# Patient Record
Sex: FEMALE | Race: WHITE | NOT HISPANIC OR LATINO | Employment: UNEMPLOYED | URBAN - METROPOLITAN AREA
[De-identification: names, ages, dates, MRNs, and addresses within clinical notes are randomized per-mention and may not be internally consistent; named-entity substitution may affect disease eponyms.]

---

## 2017-04-20 ENCOUNTER — ALLSCRIPTS OFFICE VISIT (OUTPATIENT)
Dept: OTHER | Facility: OTHER | Age: 32
End: 2017-04-20

## 2017-04-20 DIAGNOSIS — F32.81 PREMENSTRUAL DYSPHORIC DISORDER: ICD-10-CM

## 2017-04-26 LAB
ADDITIONAL INFORMATION (HISTORICAL): NORMAL
ADEQUACY: (HISTORICAL): NORMAL
CYTOTECHNOLOGIST: (HISTORICAL): NORMAL
INTERPRETATION (HISTORICAL): NORMAL
LMP (HISTORICAL): NORMAL
PREV. BX: (HISTORICAL): NORMAL
PREV. PAP (HISTORICAL): NORMAL
SOURCE (HISTORICAL): NORMAL

## 2018-01-13 NOTE — PROCEDURES
Plan  Currently pregnant    · (Q) HIV-1/2 ANTIGEN AND ANTIBODIES, FOURTH GENERATION, with REFLEXES; [Do  Not Release]; Status:Active - Retrospective Authorization; Requested PPI:[id number];    Perform:[facility]; XIS:[id number]; Last Updated By:Maggy Zelaya; 1/20/2016 7:20:54 AM;Ordered; For:Currently pregnant; Ordered By:Melody Garcia;   · (Q) OBSTETRIC PANEL; Status:Active - Retrospective Authorization; Requested  XMJ:[id number];    Perform:[facility]; Order Comments:Urine to be run for group b strep test code 1850; EXV:[id number]; Last Updated By:Maggy Zelaya; 1/20/2016 7:20:54 AM;Ordered; For:Currently pregnant; Ordered By:Melody Garcia;   · *[phone] Physician Referral  Consult  Status: Hold For -  Scheduling,Retrospective Authorization  Requested for: [id number]   Ordered; For: Currently pregnant; Ordered By: Ping Huerta Performed:  Due: 84FHS2820; Last Updated By: [facility] [place]; 1/20/2016 7:20:55 AM  Care Summary provided  : Yes    Active Problems    1  Currently pregnant (V22 2) (Z33 1)   2  Fatigue (780 79) (R53 83)   3  Palpitations (785 1) (R00 2)    Allergies    1  No Known Drug Allergies    2  Seasonal    Results/Data  [id number] Transvaginal Ultrasound OB [facility]:   Procedure: The study was done today in the office  Indication: EDC gestational age 8w7d weeks  Exam indication: Dating/viability  Findings:   Number of gestational sacs and fetuses: 1/1  Gestational sac/fetal measurements appropriate for gestation: Sac=9+ wks  CRL=24mm=9w1d  Survey of visible fetal and placental anatomic structure WNL  Qualitative assessment of amniotic fluid volume/gestational sac shape: WNL  Exam of maternal uterus and adnexa: UT=109 x 80 x 90 mm  RT OV=29 x 21 x 27 mm,LT OV=32 x 30 x 34 mm  Impression: SV DYZ=770 BPM  EDC=8/23/2016        Signatures   Electronically signed by : Philip Boone, ; Jan 20 2016  9:29AM EST                       (Author)    Electronically signed by : ALEM White ; Jan 20 2016  9:37AM EST                       (Author)

## 2018-01-14 VITALS
BODY MASS INDEX: 22.7 KG/M2 | HEIGHT: 61 IN | WEIGHT: 120.25 LBS | DIASTOLIC BLOOD PRESSURE: 70 MMHG | SYSTOLIC BLOOD PRESSURE: 104 MMHG

## 2018-01-14 NOTE — PROGRESS NOTES
2016         RE: Teri Lynn                                   [facility]   MR#: [id number]                                   [address]   : [address] [address]   ENC: [id number]                             [place], [address]   (Exam #: [id number])                           Fax: [phone]      The LMP of this 27year old,  5, para 2 patient was 2015,   giving her an SHUBHAM of AUG 26 2016 and a current gestational age of 25 weeks   5 days by dates  The patient has a history of one ectopic pregnancy  A   sonographic examination was performed on 2016 using real time   equipment  The ultrasound examination was performed using abdominal &   vaginal techniques  The patient has a BMI of 24 4  Her blood pressure   today was 108/65  Earliest ultrasound found in her record:  2016   9w 1d    SHUBHAM      Thank you very much for your kind referral of Teri Lynn to the [facility] in [place] on 2016 for level II ultrasound evaluation   and  assessment  Sean Yarbrough is a 72-year-old  5 para  white   female who is currently at 20-5/7 weeks gestation by an estimated due date   of 2016  With the exception of occasional migraine headaches,   her prenatal course has been unremarkable so far  Sean Yarbrough has no complaints  She reports fetal movement and denies vaginal bleeding  She declined   genetic screening earlier in the pregnancy  Sean Yarbrough has a history of 2 prior vaginal deliveries at term in 2004 and 2014   following uncomplicated prenatal courses  She delivered appropriately   grown babies, each currently healthy  She also has a history of one tubal   ectopic pregnancy and one first trimester spontaneous pregnancy losses     Sean Yarbrough has a history of an apparent tachyarrhythmia, for which she underwent   a cardiac ablative procedure at the [address] [place] [address] [place] in 2014  She is currently not treated medically for the   indication of an arrhythmia  Her past medical history is otherwise   significant for mitral valve prolapse and regurgitation  Her past surgical   history is significant for wisdom teeth removal   Ching Dawson has an apparent   history of endometriosis  She takes no medication with the exception of a   prenatal vitamin on a daily basis and has no known drug allergy  She   denies tobacco, alcohol, or illicit drug use during the pregnancy  The   family medical history is negative with respect to first degree relatives   with diabetes, hypertension, or venous thromboembolism  The family genetic   history is negative with respect to genetic abnormalities, birth defects,   or mental retardation        Cardiac motion was observed at 154 bpm       INDICATIONS      fetal anatomical survey      Exam Types      LEVEL II   Transvaginal      RESULTS      Fetus # 1 of 1   Breech presentation   Fetal growth appeared normal   Placenta Location = Anterior   No placenta previa   Placenta Grade = I      MEASUREMENTS (* Included In Average GA)      AC              15 8 cm        20 weeks 4 days* (49%)   BPD              4 9 cm        20 weeks 6 days* (53%)   HC              18 3 cm        20 weeks 4 days* (45%)   Femur            3 3 cm        20 weeks 4 days* (39%)      Nuchal Fold      3 6 mm      Humerus          3 4 cm        21 weeks 5 days  (69%)   Radius           2 7 cm        21 weeks 3 days   Ulna             3 2 cm        22 weeks 0 days   Tibia            3 1 cm        21 weeks 1 day   (56%)   Fibula           2 9 cm        19 weeks 5 days   Foot             3 4 cm        20 weeks 3 days      Cerebellum       2 2 cm        21 weeks 5 days   Biorbit          3 1 cm        19 weeks 6 days   CisternaMagna    3 9 mm      HC/AC           1 16   FL/AC           0 21   FL/BPD          0 67   EFW (Ac/Fl/Hc)   366 grams - 0 lbs 13 oz      THE AVERAGE GESTATIONAL AGE is 20 weeks 5 days +/- 10 days  AMNIOTIC FLUID      Largest Vertical Pocket = 5 3 cm   Amniotic Fluid: Normal      ANATOMY SUMMARY      The fetal cranium appeared normal in shape  Choroid plexus cysts are not   present  The lateral ventricles were not dilated and the midline   structures were not deviated  The cerebellum and cisterna magna were   visualized and appeared normal  The nuchal fold is not abnormally   thickened, measured at 3 6 mm  The calvarium showed no evidence of defect,   scalloping of the parietal bones or abnormal shape  The cavum septum   pellucidum appears normal  The fetal face appears normal  There is no   evidence of facial clefting, hypotelorism, hypertelorism or micrognathia  A normal appearing nasal bone measuring 6 0 mm was identified in the   sagittal profile view  3-D views of the face appeared normal  Anatomy of   the fetal thorax appeared within normal limits  The fetal diaphragm   appears intact  There were no intrathoracic masses noted or evidence of   pleural/pericardial effusion  The cardiac arch views appeared normal     There was no suspicion of tricuspid regurgitation  The cardiac size and   structures appeared sonographically normal at the four chamber view, and   cardiac rhythm was regular  There is no suspicion of fetal dysrhythmia  There was no evidence of cardiomegaly, pericardial effusion or   atrial/ventricular disproportion  Two atrioventricular valves were noted   with normal inflow, confirmed with color Doppler imaging  Ventriculoarterial connections are appropriate and normal short axis of   the great vessels is demonstrated  The interventricular septum appeared to   be intact  There is no suspicion of an echogenic intracardiac focus  The   three vessel  tracheal view appears normal   The outflow tract views are   normal  The abdominal cavity appears normal  There is no evidence of fetal   bowel obstruction or abnormally echogenic bowel   Ascites is not present  The fetal stomach appears normal in size and shape  The right kidney   appears normal  There is no suspicion of pyelectasis  Renal cysts are not   present  The echogenicity of the kidney is normal  The left kidney   appears normal   There is no suspicion of pyelectasis  The echogenicity   of the kidney is normal   renal cysts are not present  The fetal bladder   appears normal in size and shape  There is no suspicion of ureterocele  The abdominal wall appears intact  A normal abdominal cord insertion is   noted  The spine was visualized from cervical to sacral region, within the   resolution of the ultrasound equipment, without evidence of a neural tube   defect  or other malformation  Active movement of the extremities was seen   and fetal body motion was also observed during this examination  There was   no evidence of long bone abnormality and the extremities were followed to   their distal extent  There was no evidence of club foot or rocker bottom   deformity  There was no evidence of abnormal hand posturing noted  The   genitalia appeared normal  The placenta appears normal  There is no   evidence of advanced placental maturation, placental abruption,   intervillous thrombosis, placental infarction or multiple venous lakes  There is a 3 vessel cord with normal insertion site  A normal amount of   umbilical vascular coiling is noted  The uterine contour appears normal    There is no suspicion of a uterine myoma  ADNEXA      The left ovary appeared normal and measured 1 3 x 1 2 x 1 8 cm with a   volume of 1 5 cc  The right ovary appeared normal and measured 2 4 x 2 5 x   1 5 cm with a volume of 4 7 cc        UTERINE ARTERIES                                  S/D   PI    RI    NOTCH       Left Uterine Artery              1 08         No       Right Uterine Artery             0 90         No      CERVICAL EVALUATION      The cervix appeared normal            Supine               Cervical Length: 3 90 cm        Other Test Results         Resp To T F  Pressure: No                    Funneling?: No              Dynamic Changes?: No      IMPRESSION      [place] IUP   20 weeks and 5 days by this ultrasound  (SHUBHAM=AUG 26 2016)   Breech presentation   Fetal growth appeared normal   Normal anatomy survey   Regular fetal heart rate of 154 bpm   Anterior placenta   No placenta previa      GENERAL COMMENT      No fetal structural abnormality or ultrasound marker for aneuploidy is   identified on the Level II ultrasound study today  Fetal growth, amniotic   fluid volume, and maternal uterine artery Doppler study are normal   The   placenta is normal in appearance  The cervix is normal in appearance by transvaginal sonography  The   cervical length is normal   Cervical debris is not present  Cervical   funneling is not present  Neither provocative nor dynamic change is   appreciated  Today's ultrasound findings and suggested follow-up were discussed in   detail with Lisette  We discussed that prenatal ultrasound cannot rule out   all congenital abnormalities  No further appointment has been scheduled in   the [facility]  for Ambrose Ghosh at this time  Follow-up [facility] ultrasound   evaluation is recommended as clinically indicated  The face to face time, in addition to time spent discussing ultrasound   results, was 10 minutes, greater than 50% of which was spent during   counseling and coordination of care  Rutha Munster, R D M S Olean Kanner, M D     Maternal-Fetal Medicine   Electronically signed 04/13/16 13:56

## 2018-01-16 NOTE — PROGRESS NOTES
Active Problems    1  Currently pregnant (V22 2) (Z33 1)   2  Encounter for fetal anatomic survey (V28 81) (Z36)   3  Encounter for screening for risk of pre-term labor (V28 82) (Z36)   4  Episode of dizziness (780 4) (R42)   5  Fatigue (780 79) (R53 83)   6  Migraine headache (346 90) (G43 909)   7  Palpitations (785 1) (R00 2)    Current Meds    1  Prenatal TABS; Therapy: (Recorded:04Apr2016) to Recorded    Allergies    1  No Known Drug Allergies    2  Seasonal    Results/Data  [id number] Abdominal Ultrasound OB [facility]:   Procedure: 19347- Ultrasound pregnant uterus real time with image documentation, limited one or more fetuses  Indication: EDC gestational age 32 weeks  Exam indication: presentation  Findings:   Fetal position: breech  Impression: Breech on US        Signatures   Electronically signed by : ALEM Flores ; May 27 2016  9:29AM EST                       (Author)

## 2018-11-29 ENCOUNTER — APPOINTMENT (EMERGENCY)
Dept: RADIOLOGY | Facility: HOSPITAL | Age: 33
DRG: 313 | End: 2018-11-29
Payer: COMMERCIAL

## 2018-11-29 ENCOUNTER — HOSPITAL ENCOUNTER (INPATIENT)
Facility: HOSPITAL | Age: 33
LOS: 5 days | Discharge: HOME | DRG: 313 | End: 2018-12-07
Attending: EMERGENCY MEDICINE | Admitting: INTERNAL MEDICINE
Payer: COMMERCIAL

## 2018-11-29 DIAGNOSIS — R07.9 CHEST PAIN: ICD-10-CM

## 2018-11-29 DIAGNOSIS — G43.909 MIGRAINE WITHOUT STATUS MIGRAINOSUS, NOT INTRACTABLE, UNSPECIFIED MIGRAINE TYPE: ICD-10-CM

## 2018-11-29 DIAGNOSIS — I34.0 MITRAL REGURGITATION: ICD-10-CM

## 2018-11-29 DIAGNOSIS — R00.0 INAPPROPRIATE SINUS TACHYCARDIA: ICD-10-CM

## 2018-11-29 DIAGNOSIS — I51.89 IMPAIRED LEFT VENTRICULAR FUNCTION: ICD-10-CM

## 2018-11-29 DIAGNOSIS — R06.00 DYSPNEA ON EXERTION: Primary | ICD-10-CM

## 2018-11-29 DIAGNOSIS — I95.9 HYPOTENSION: ICD-10-CM

## 2018-11-29 DIAGNOSIS — I34.1 MITRAL VALVE PROLAPSE: ICD-10-CM

## 2018-11-29 PROBLEM — I47.11 INAPPROPRIATE SINUS TACHYCARDIA: Status: ACTIVE | Noted: 2018-11-29

## 2018-11-29 PROBLEM — E55.9 VITAMIN D DEFICIENCY: Status: ACTIVE | Noted: 2018-11-29

## 2018-11-29 LAB
ALBUMIN SERPL BCP-MCNC: 3.8 G/DL (ref 3.5–5)
ALP SERPL-CCNC: 55 U/L (ref 46–116)
ALT SERPL W P-5'-P-CCNC: 20 U/L (ref 12–78)
ANION GAP SERPL CALCULATED.3IONS-SCNC: 4 MMOL/L (ref 4–13)
AST SERPL W P-5'-P-CCNC: 11 U/L (ref 5–45)
ATRIAL RATE: 65 BPM
ATRIAL RATE: 71 BPM
BASOPHILS # BLD AUTO: 0.03 THOUSANDS/ΜL (ref 0–0.1)
BASOPHILS NFR BLD AUTO: 0 % (ref 0–1)
BILIRUB SERPL-MCNC: 0.77 MG/DL (ref 0.2–1)
BUN SERPL-MCNC: 7 MG/DL (ref 5–25)
CALCIUM SERPL-MCNC: 9.4 MG/DL (ref 8.3–10.1)
CHLORIDE SERPL-SCNC: 105 MMOL/L (ref 100–108)
CO2 SERPL-SCNC: 26 MMOL/L (ref 21–32)
CREAT SERPL-MCNC: 0.73 MG/DL (ref 0.6–1.3)
DEPRECATED D DIMER PPP: <220 NG/ML (FEU)
EOSINOPHIL # BLD AUTO: 0.09 THOUSAND/ΜL (ref 0–0.61)
EOSINOPHIL NFR BLD AUTO: 1 % (ref 0–6)
ERYTHROCYTE [DISTWIDTH] IN BLOOD BY AUTOMATED COUNT: 12.3 % (ref 11.6–15.1)
GFR SERPL CREATININE-BSD FRML MDRD: 109 ML/MIN/1.73SQ M
GLUCOSE SERPL-MCNC: 84 MG/DL (ref 65–140)
HCT VFR BLD AUTO: 39.6 % (ref 34.8–46.1)
HGB BLD-MCNC: 12.9 G/DL (ref 11.5–15.4)
IMM GRANULOCYTES # BLD AUTO: 0.03 THOUSAND/UL (ref 0–0.2)
IMM GRANULOCYTES NFR BLD AUTO: 0 % (ref 0–2)
LYMPHOCYTES # BLD AUTO: 2.64 THOUSANDS/ΜL (ref 0.6–4.47)
LYMPHOCYTES NFR BLD AUTO: 32 % (ref 14–44)
MCH RBC QN AUTO: 29.3 PG (ref 26.8–34.3)
MCHC RBC AUTO-ENTMCNC: 32.6 G/DL (ref 31.4–37.4)
MCV RBC AUTO: 90 FL (ref 82–98)
MONOCYTES # BLD AUTO: 0.51 THOUSAND/ΜL (ref 0.17–1.22)
MONOCYTES NFR BLD AUTO: 6 % (ref 4–12)
NEUTROPHILS # BLD AUTO: 4.86 THOUSANDS/ΜL (ref 1.85–7.62)
NEUTS SEG NFR BLD AUTO: 61 % (ref 43–75)
NRBC BLD AUTO-RTO: 0 /100 WBCS
NT-PROBNP SERPL-MCNC: 70 PG/ML
P AXIS: 27 DEGREES
P AXIS: 46 DEGREES
PLATELET # BLD AUTO: 206 THOUSANDS/UL (ref 149–390)
PMV BLD AUTO: 10.9 FL (ref 8.9–12.7)
POTASSIUM SERPL-SCNC: 4.1 MMOL/L (ref 3.5–5.3)
PR INTERVAL: 112 MS
PR INTERVAL: 128 MS
PROT SERPL-MCNC: 7.1 G/DL (ref 6.4–8.2)
QRS AXIS: 77 DEGREES
QRS AXIS: 83 DEGREES
QRSD INTERVAL: 84 MS
QRSD INTERVAL: 86 MS
QT INTERVAL: 382 MS
QT INTERVAL: 410 MS
QTC INTERVAL: 415 MS
QTC INTERVAL: 426 MS
RBC # BLD AUTO: 4.4 MILLION/UL (ref 3.81–5.12)
SODIUM SERPL-SCNC: 135 MMOL/L (ref 136–145)
T WAVE AXIS: -7 DEGREES
T WAVE AXIS: -9 DEGREES
TROPONIN I SERPL-MCNC: <0.02 NG/ML
VENTRICULAR RATE: 65 BPM
VENTRICULAR RATE: 71 BPM
WBC # BLD AUTO: 8.16 THOUSAND/UL (ref 4.31–10.16)

## 2018-11-29 PROCEDURE — 84484 ASSAY OF TROPONIN QUANT: CPT | Performed by: EMERGENCY MEDICINE

## 2018-11-29 PROCEDURE — 99220 PR INITIAL OBSERVATION CARE/DAY 70 MINUTES: CPT | Performed by: INTERNAL MEDICINE

## 2018-11-29 PROCEDURE — 99285 EMERGENCY DEPT VISIT HI MDM: CPT

## 2018-11-29 PROCEDURE — 93005 ELECTROCARDIOGRAM TRACING: CPT

## 2018-11-29 PROCEDURE — 80053 COMPREHEN METABOLIC PANEL: CPT | Performed by: EMERGENCY MEDICINE

## 2018-11-29 PROCEDURE — 83880 ASSAY OF NATRIURETIC PEPTIDE: CPT | Performed by: EMERGENCY MEDICINE

## 2018-11-29 PROCEDURE — 36415 COLL VENOUS BLD VENIPUNCTURE: CPT

## 2018-11-29 PROCEDURE — 85025 COMPLETE CBC W/AUTO DIFF WBC: CPT | Performed by: EMERGENCY MEDICINE

## 2018-11-29 PROCEDURE — 93010 ELECTROCARDIOGRAM REPORT: CPT | Performed by: INTERNAL MEDICINE

## 2018-11-29 PROCEDURE — 71046 X-RAY EXAM CHEST 2 VIEWS: CPT

## 2018-11-29 PROCEDURE — 85379 FIBRIN DEGRADATION QUANT: CPT

## 2018-11-29 RX ORDER — ACETAMINOPHEN 325 MG/1
975 TABLET ORAL ONCE
Status: COMPLETED | OUTPATIENT
Start: 2018-11-29 | End: 2018-11-29

## 2018-11-29 RX ORDER — PROPRANOLOL HYDROCHLORIDE 10 MG/1
10 TABLET ORAL ONCE
Status: DISCONTINUED | OUTPATIENT
Start: 2018-11-29 | End: 2018-11-29

## 2018-11-29 RX ORDER — PROPRANOLOL HYDROCHLORIDE 10 MG/1
10 TABLET ORAL DAILY
Status: DISCONTINUED | OUTPATIENT
Start: 2018-11-30 | End: 2018-11-30

## 2018-11-29 RX ORDER — PROPRANOLOL HYDROCHLORIDE 10 MG/1
10 TABLET ORAL DAILY
COMMUNITY
End: 2018-12-07 | Stop reason: HOSPADM

## 2018-11-29 RX ADMIN — ACETAMINOPHEN 975 MG: 325 TABLET, FILM COATED ORAL at 17:38

## 2018-11-29 NOTE — ED PROVIDER NOTES
History  Chief Complaint   Patient presents with    Chest Pain     patient reports chest squeezing since 0200 this morning, SOB on exertion     70-year-old woman with a history of mitral valve prolapse, mitral valve regurgitation, and SVT status post ablation presents for evaluation of chest pain  Patient reports multiple episodes of a left-sided chest pressure with radiation underneath the left breast starting at 2:00 a m  this morning  She notes associated worsening dyspnea on exertion and palpitations over the past 2 weeks  No nausea or diaphoresis  Patient follows with Dr Ashwin Arboleda of Cardiology out of [place] [address]  She was evaluated approximately 1 week ago with echo showing worsening mitral regurgitation and Holter monitoring showing multiple episodes of SVT  Patient was started on propranolol which she has been compliant with  No smoking or drug use  No comorbidities  On arrival, patient is afebrile with otherwise normal vital signs  Physical exam is unremarkable including no lower extremity edema and clear lungs  She denies recent lower extremity edema, orthopnea, or PND  She has followed previously with Dr Allison Li of EP here  Will perform cardiac workup  Will likely admit patient for further evaluation  Prior to Admission Medications   Prescriptions Last Dose Informant Patient Reported? Taking?   propranolol (INDERAL) 10 mg tablet   Yes Yes   Sig: Take 10 mg by mouth daily      Facility-Administered Medications: None       Past Medical History:   Diagnosis Date    Acute Lyme disease     Endometriosis     Endometriosis     Mitral valve regurgitation     MVP (mitral valve prolapse)     PVC (premature ventricular contraction)     SVT (supraventricular tachycardia) (HCC)        Past Surgical History:   Procedure Laterality Date    ABLATION OF DYSRHYTHMIC FOCUS      DIAGNOSTIC LAPAROSCOPY      for endometriosis    LAPAROSCOPY FOR ECTOPIC PREGNANCY         History reviewed   No pertinent family history  I have reviewed and agree with the history as documented  Social History   Substance Use Topics    Smoking status: Former Smoker     Quit date: 8/30/2013    Smokeless tobacco: Never Used    Alcohol use No        Review of Systems   Constitutional: Positive for fatigue  Negative for chills and fever  HENT: Negative for rhinorrhea and sore throat  Eyes: Negative for photophobia and visual disturbance  Respiratory: Positive for shortness of breath  Negative for cough  Cardiovascular: Positive for chest pain and palpitations  Negative for leg swelling  Gastrointestinal: Negative for abdominal pain, diarrhea, nausea and vomiting  Genitourinary: Negative for dysuria, frequency and hematuria  Musculoskeletal: Negative for back pain and neck pain  Skin: Negative for rash and wound  Neurological: Positive for headaches  Negative for light-headedness  Physical Exam  ED Triage Vitals   Temperature Pulse Respirations Blood Pressure SpO2   11/29/18 1252 11/29/18 1252 11/29/18 1252 11/29/18 1252 11/29/18 1252   98 1 °F (36 7 °C) 90 18 114/56 98 %      Temp Source Heart Rate Source Patient Position - Orthostatic VS BP Location FiO2 (%)   11/29/18 1252 11/29/18 1252 11/29/18 1252 11/29/18 1252 --   Oral Monitor Lying Right arm       Pain Score       11/29/18 1251       5           Orthostatic Vital Signs  Vitals:    11/30/18 1058 11/30/18 1100 11/30/18 1125 11/30/18 1127   BP: 115/65 115/65 109/60 112/67   Pulse: 74 80 75 84   Patient Position - Orthostatic VS: Lying  Lying - Orthostatic VS Standing - Orthostatic VS       Physical Exam   Constitutional: She is oriented to person, place, and time  She appears well-developed and well-nourished  No distress  HENT:   Head: Normocephalic and atraumatic  Eyes: Pupils are equal, round, and reactive to light  Conjunctivae are normal  No scleral icterus  Neck: Neck supple  No JVD present     Cardiovascular: Normal rate, regular rhythm and normal heart sounds  Exam reveals no gallop and no friction rub  No murmur heard  Pulmonary/Chest: Effort normal and breath sounds normal  No respiratory distress  She has no wheezes  She has no rales  Abdominal: Soft  She exhibits no distension  There is no tenderness  There is no rebound and no guarding  Musculoskeletal: She exhibits no edema or tenderness  Neurological: She is alert and oriented to person, place, and time  Skin: Skin is warm and dry  She is not diaphoretic  Psychiatric: She has a normal mood and affect  Her behavior is normal  Thought content normal    Vitals reviewed  ED Medications  Medications   propranolol (INDERAL) tablet 10 mg (10 mg Oral Given 11/30/18 1020)   acetaminophen (TYLENOL) tablet 975 mg (975 mg Oral Given 11/29/18 1738)   ketorolac (TORADOL) injection 15 mg (15 mg Intravenous Given 11/30/18 0202)   ketorolac (TORADOL) injection 15 mg (15 mg Intravenous Given 11/30/18 1102)       Diagnostic Studies  Results Reviewed     Procedure Component Value Units Date/Time    hCG, serum, qualitative [[id number]]  (Normal) Collected:  11/30/18 1116    Lab Status:  Final result Specimen:  Blood from Arm, Left Updated:  11/30/18 1149     Preg, Serum Negative    TSH, 3rd generation [[id number]] Collected:  11/30/18 1116    Lab Status:   In process Specimen:  Blood from Arm, Left Updated:  11/30/18 5626    Basic metabolic panel [[id number]] Collected:  11/30/18 0410    Lab Status:  Final result Specimen:  Blood from Arm, Left Updated:  11/30/18 0503     Sodium 137 mmol/L      Potassium 3 8 mmol/L      Chloride 106 mmol/L      CO2 26 mmol/L      ANION GAP 5 mmol/L      BUN 13 mg/dL      Creatinine 0 73 mg/dL      Glucose 94 mg/dL      Calcium 9 0 mg/dL      eGFR 109 ml/min/1 73sq m     Narrative:         National Kidney Disease Education Program recommendations are as follows:  GFR calculation is accurate only with a steady state creatinine  Chronic Kidney disease less than 60 ml/min/1 73 sq  meters  Kidney failure less than 15 ml/min/1 73 sq  meters      Troponin I [[id number]]  (Normal) Collected:  11/30/18 0410    Lab Status:  Final result Specimen:  Blood from Arm, Left Updated:  11/30/18 0441     Troponin I <0 02 ng/mL     CBC (With Platelets) [[id number]]  (Normal) Collected:  11/30/18 0410    Lab Status:  Final result Specimen:  Blood from Arm, Left Updated:  11/30/18 0420     WBC 7 58 Thousand/uL      RBC 4 00 Million/uL      Hemoglobin 12 1 g/dL      Hematocrit 35 6 %      MCV 89 fL      MCH 30 3 pg      MCHC 34 0 g/dL      RDW 12 3 %      Platelets 699 Thousands/uL      MPV 11 2 fL     Troponin I [[id number]]  (Normal) Collected:  11/30/18 0249    Lab Status:  Final result Specimen:  Blood from Arm, Left Updated:  11/30/18 0329     Troponin I <0 02 ng/mL     Troponin I [[id number]]  (Normal) Collected:  11/30/18 0008    Lab Status:  Final result Specimen:  Blood from Arm, Left Updated:  11/30/18 0048     Troponin I <0 02 ng/mL     D-dimer, quantitative [[id number]]  (Normal) Collected:  11/29/18 1301    Lab Status:  Final result Specimen:  Blood from Arm, Left Updated:  11/29/18 2012     D-Dimer, Quant <220 ng/ml (FEU)     BNP [[id number]]  (Normal) Collected:  11/29/18 1301    Lab Status:  Final result Specimen:  Blood from Arm, Left Updated:  11/29/18 1728     NT-proBNP 70 pg/mL     Troponin I [[id number]]  (Normal) Collected:  11/29/18 1301    Lab Status:  Final result Specimen:  Blood from Arm, Left Updated:  11/29/18 1342     Troponin I <0 02 ng/mL     Comprehensive metabolic panel [[id number]]  (Abnormal) Collected:  11/29/18 1301    Lab Status:  Final result Specimen:  Blood from Arm, Left Updated:  11/29/18 1342     Sodium 135 (L) mmol/L      Potassium 4 1 mmol/L      Chloride 105 mmol/L      CO2 26 mmol/L      ANION GAP 4 mmol/L      BUN 7 mg/dL      Creatinine 0 73 mg/dL      Glucose 84 mg/dL      Calcium 9 4 mg/dL      AST 11 U/L      ALT 20 U/L      Alkaline Phosphatase 55 U/L      Total Protein 7 1 g/dL      Albumin 3 8 g/dL      Total Bilirubin 0 77 mg/dL      eGFR 109 ml/min/1 73sq m     Narrative:         National Kidney Disease Education Program recommendations are as follows:  GFR calculation is accurate only with a steady state creatinine  Chronic Kidney disease less than 60 ml/min/1 73 sq  meters  Kidney failure less than 15 ml/min/1 73 sq  meters  CBC and differential [[id number]] Collected:  11/29/18 1301    Lab Status:  Final result Specimen:  Blood from Arm, Left Updated:  11/29/18 1324     WBC 8 16 Thousand/uL      RBC 4 40 Million/uL      Hemoglobin 12 9 g/dL      Hematocrit 39 6 %      MCV 90 fL      MCH 29 3 pg      MCHC 32 6 g/dL      RDW 12 3 %      MPV 10 9 fL      Platelets 730 Thousands/uL      nRBC 0 /100 WBCs      Neutrophils Relative 61 %      Immat GRANS % 0 %      Lymphocytes Relative 32 %      Monocytes Relative 6 %      Eosinophils Relative 1 %      Basophils Relative 0 %      Neutrophils Absolute 4 86 Thousands/µL      Immature Grans Absolute 0 03 Thousand/uL      Lymphocytes Absolute 2 64 Thousands/µL      Monocytes Absolute 0 51 Thousand/µL      Eosinophils Absolute 0 09 Thousand/µL      Basophils Absolute 0 03 Thousands/µL                  X-ray chest 2 views   ED Interpretation by Maggy Jefferson MD (11/29 1756)   No acute disease      Final Result by Joce Ordoñez DO (11/29 1955)   No acute cardiopulmonary disease  Workstation performed: [id number]               Procedures  Procedures      Phone Consults  ED Phone Contact    ED Course  ED Course as of Nov 30 1156   Thu Nov 29, 2018   1803 Procedure Note: EKG  Date/Time: 11/29/18 6:03 PM   Indications / Diagnosis: CP  ECG reviewed by me, the ED Provider: yes   The EKG demonstrates:  Rhythm: normal sinus  Intervals: normal intervals  Axis: normal axis  QRS/Blocks: normal QRS  ST Changes: No acute ST Changes, no STD/LOUIS      Flipped T wave lead III MDM  Number of Diagnoses or Management Options  Chest pain:   Dyspnea on exertion:   Mitral regurgitation:   Mitral valve prolapse:   Diagnosis management comments: Cardiac workup unremarkable  Troponin negative and EKG without arrhythmia or ischemia  Patient was given her home dose of Inderall and admitted to [facility] for further management  CritCare Time    Disposition  Final diagnoses:   Dyspnea on exertion   Chest pain   Mitral regurgitation   Mitral valve prolapse     Time reflects when diagnosis was documented in both MDM as applicable and the Disposition within this note     Time User Action Codes Description Comment    11/29/2018  6:15 PM Laila Dingson Add [R06 09] Dyspnea on exertion     11/29/2018  6:16 PM Laila Dingchinson Add [R07 9] Chest pain     11/29/2018  6:16 PM Laila Dingchinson Add [I34 0] Mitral regurgitation     11/29/2018  6:16 PM Laila Dingchinson Add [I34 1] Mitral valve prolapse     11/29/2018  7:00 PM John oTdd Add [R00 0] Inappropriate sinus tachycardia     11/29/2018  7:00 PM Ilyarebecca Infante [R00 0] Inappropriate sinus tachycardia     11/29/2018  7:00 PM Chrystie Craig [I51 9] Impaired left ventricular function     11/29/2018  7:00 PM Eighty Eight Naresh [I51 9] Impaired left ventricular function       ED Disposition     ED Disposition Condition Comment    Admit  Case was discussed with Dr Lauren Sauer and the patient's admission status was agreed to be Admission Status: observation status to the service of Dr Lauren Sauer  Follow-up Information     Follow up With Specialties Details Why [address], MD Cardiology Schedule an appointment as soon as possible for a visit in 2 week(s)  [address] [address] [phone]            Patient's Medications   Discharge Prescriptions    No medications on file     No discharge procedures on file      ED Provider  Attending physically available and matthew Figueroa I managed the patient along with the ED Attending      Electronically Signed by         Nir Townsend MD  11/30/18 9498

## 2018-11-29 NOTE — ED NOTES
I spoke with the patient in regards to the wait time  Patient expecting to be taken back sooner due to her symptoms and the fact that her doctor called a head  I informed her of the call in procedure for physicians calling ahead  Patient understands and is just frustrated to be waiting this long   I informed her that someone would check on her in about [address] PADMINI Carr  11/29/18 6151

## 2018-11-29 NOTE — ED ATTENDING ATTESTATION
I, Charo Paredes DO, saw and evaluated the patient  I have discussed the patient with the resident/non-physician practitioner and agree with the resident's/non-physician practitioner's findings, Plan of Care, and MDM as documented in the resident's/non-physician practitioner's note, except where noted  All available labs and Radiology studies were reviewed  At this point I agree with the current assessment done in the Emergency Department  I have conducted an independent evaluation of this patient a history and physical is as follows:      Critical Care Time  CritCare Time    Procedures     35 yr old with CP, sob and progressive CRUZ  Incr in MR on recent echo  Some incr in tachy  Exm; lungs cta   + murmur  No edema  Pln: cardiac screen with adm

## 2018-11-29 NOTE — ASSESSMENT & PLAN NOTE
· Unclear etiology  · First troponin negative - will trend  · CXR official read pending  · ECG with TWI in Leads III and V1  · ACS rule out  · Given her cardiac history, will appreciate Cardiology consult  · Telemetry  · She is not hypoxic or tachycardic (she did not take her propanolol today)  She denies OCP use  She denies calf pain or tenderness to palpation bilaterally     · Her outpatient Cardiologist was considering Cardiac MRI  · Will check D-dimer

## 2018-11-29 NOTE — ASSESSMENT & PLAN NOTE
· Recent Echo revealed mildly myxomatous mitral valve with mild mitral valve prolapse and moderate regurgitation (this is mildly increased when compared to prior 2014 echo)

## 2018-11-29 NOTE — ASSESSMENT & PLAN NOTE
· Follows with Dr Jaz Nicole of [place] [address] , Dr Gil Resendiz of [facility] [place], and Dr Jaci Goss of [facility] as outpatient  · She is s/p SVT ablation in 2014  · She was intolerant to metoprolol succinate in the past (hypotension, lightheadedness) and could nto take Bystolic (due to cost)   She was recently placed on 10mg daily propanolol as outpatient and has been compliant with this

## 2018-11-29 NOTE — H&P
H&P- Loretta Bosque 1985, 35 y o  female MRN: [id number]    Unit/Bed#: CRB Encounter: [id number]    Primary Care Provider: Raina Watson MD   Date and time admitted to hospital: 11/29/2018  4:15 PM        * Chest pain   Assessment & Plan    · Unclear etiology  · First troponin negative - will trend  · CXR official read pending  · ECG with TWI in Leads III and V1  · ACS rule out  · Given her cardiac history, will appreciate Cardiology consult  · Telemetry  · She is not hypoxic or tachycardic (she did not take her propanolol today)  She denies OCP use  She denies calf pain or tenderness to palpation bilaterally  · Her outpatient Cardiologist was considering Cardiac MRI  · Will check D-dimer     Inappropriate sinus tachycardia   Assessment & Plan    · Follows with Dr Modesto Kelly of [facility], Dr Sherine Lares of [place] [address], and Dr Sunshine Ariza of [facility] as outpatient  · She is s/p SVT ablation in 2014  · She was intolerant to metoprolol succinate in the past (hypotension, lightheadedness) and could nto take Bystolic (due to cost)  She was recently placed on 10mg daily propanolol as outpatient and has been compliant with this      Impaired left ventricular function   Assessment & Plan    · Echo from November 16 2018 with LVEF 50% (was 45% prior per outpatient Cardiology records)     Moderate mitral valve regurgitation   Assessment & Plan    · Recent Echo revealed mildly myxomatous mitral valve with mild mitral valve prolapse and moderate regurgitation (this is mildly increased when compared to prior 2014 echo)     Vitamin D deficiency   Assessment & Plan    Continue supplementation        VTE Prophylaxis: non per admission checklist, patient is low-risk  Code Status: FULL CODE - I discussed with the patient personally on admission   POLST: POLST form is not discussed and not completed at this time      Anticipated Length of Stay:  Patient will be admitted on an Observation basis with an anticipated length of stay of  < 2 midnights  Justification for Hospital Stay: plan as per above    Total Time for Visit, including Counseling / Coordination of Care: 1 hour  Greater than 50% of this total time spent on direct patient counseling and coordination of care  Chief Complaint:   Chest pain     History of Present Illness:    Sowmya Milan is a 35 y o  female with a history of decreased left ventricular function, s/p SVT ablation, mitral valve prolapse and regurg, and vitamin D deficiency who presents with chest pain  She describes her pain as "squeeze" and "pressure" which begins in her sternum and radiates to under her left breast  She has had prior similar episodes  These episodes began last night, and she had about 5-6 in 10 minutes  She says her prior similar episodes were quick and fleeting, however these lasted about 30 seconds  She woke up today and had about 6 episodes by noon, prompting her to call her Cardiologist who told her to come to the ED  She states that the pain was a 9/10  She reports associated CRUZ, dizziness and lightheadedness  She was recently started on propanolol, 10mg daily, which she has been compliant with but did not take today's dose  In the past she could not take Bystolic 2/2 cost and metoprolol 2/2 hypotension and lightheadedness  She believes her lightheadedness is worse since beginning the propanolol but has been taking it  She reports CRUZ with any physical activity  She does not work  She does admit to feeling anxious, and sometimes believes that she feels anxious and then her heart rate racing, and sometimes the other way around  She otherwise denies complaint  She denies calf pain or tenderness  She denies OCP use or recent travel  She reports a family cardiac history of mitral valve replacement in her father and her grandfather had a history of cancer and had sudden collapse and death while out camping which they believe was cardiac in nature       Review of Systems:    Review of Systems   Constitutional: Negative for activity change, appetite change, chills and fever  HENT: Negative  Respiratory: Positive for shortness of breath  Negative for cough  Cardiovascular: Positive for chest pain and palpitations  Negative for leg swelling  Gastrointestinal: Negative for abdominal pain, blood in stool, constipation, diarrhea, nausea and vomiting  Genitourinary: Negative for difficulty urinating, dysuria and hematuria  Musculoskeletal: Negative for gait problem  Skin: Negative for rash  Neurological: Positive for dizziness and light-headedness  Psychiatric/Behavioral: Negative for confusion  Past Medical and Surgical History:     Past Medical History:   Diagnosis Date    Acute Lyme disease     Endometriosis     Endometriosis     Mitral valve regurgitation     MVP (mitral valve prolapse)     PVC (premature ventricular contraction)     SVT (supraventricular tachycardia) (HCC)        Past Surgical History:   Procedure Laterality Date    ABLATION OF DYSRHYTHMIC FOCUS      DIAGNOSTIC LAPAROSCOPY      for endometriosis    LAPAROSCOPY FOR ECTOPIC PREGNANCY         Meds/Allergies:    Prior to Admission medications    Not on File     I have reviewed home medications with patient personally  Allergies: No Known Allergies    Social History:     Marital Status: /Civil Union   Occupation: Not working  Patient Pre-hospital Living Situation: Home  Patient Pre-hospital Level of Mobility: Ambulatory  Patient Pre-hospital Diet Restrictions: None reported  Substance Use History:   History   Alcohol Use No     History   Smoking Status    Former Smoker    Quit date: 8/30/2013   Smokeless Tobacco    Never Used     History   Drug Use No       Family History:    History reviewed  No pertinent family history      Physical Exam:     Vitals:   Blood Pressure: 107/66 (11/29/18 1901)  Pulse: 68 (11/29/18 1901)  Temperature: 98 1 °F (36 7 °C) (11/29/18 1252)  Temp Source: Oral (11/29/18 1252)  Respirations: (!) 24 (11/29/18 1901)  SpO2: 96 % (11/29/18 1901)    Physical Exam   Constitutional: She is oriented to person, place, and time  No distress  Patient seen lying in ED bed with HOB elevated  Pleasant and cooperative    Cardiovascular: Normal rate and regular rhythm  Pulmonary/Chest: Effort normal and breath sounds normal  No respiratory distress  She has no wheezes  Abdominal: Soft  Bowel sounds are normal  There is no tenderness  Musculoskeletal: She exhibits no edema  Denies calf tenderness to palpation b/l   Neurological: She is alert and oriented to person, place, and time  Skin: Skin is warm  Psychiatric: She has a normal mood and affect  Non-anxious appearing    Vitals reviewed  Additional Data:     Lab Results: I have personally reviewed pertinent reports  Results from last 7 days  Lab Units 11/29/18  1301   WBC Thousand/uL 8 16   HEMOGLOBIN g/dL 12 9   HEMATOCRIT % 39 6   PLATELETS Thousands/uL 206   NEUTROS PCT % 61   LYMPHS PCT % 32   MONOS PCT % 6   EOS PCT % 1       Results from last 7 days  Lab Units 11/29/18  1301   SODIUM mmol/L 135*   POTASSIUM mmol/L 4 1   CHLORIDE mmol/L 105   CO2 mmol/L 26   BUN mg/dL 7   CREATININE mg/dL 0 73   ANION GAP mmol/L 4   CALCIUM mg/dL 9 4   ALBUMIN g/dL 3 8   TOTAL BILIRUBIN mg/dL 0 77   ALK PHOS U/L 55   ALT U/L 20   AST U/L 11   GLUCOSE RANDOM mg/dL 84                       Imaging: I have personally reviewed pertinent reports  X-ray chest 2 views   ED Interpretation by Victor Manuel Doe MD (11/29 1756)   No acute disease          EKG, Pathology, and Other Studies Reviewed on Admission:   · EKG: TWI in Leads III and V1    Allscripts / Epic Records Reviewed: Yes     ** Please Note: This note has been constructed using a voice recognition system   **

## 2018-11-29 NOTE — LETTER
[address] [place] [address]  [address] [facility] [address]  Dept: [phone]    December 4, 2018     Patient: Silverio Morales   YOB: 1985   Date of Visit: 11/29/2018       To Whom it May Concern:    Keyshawn Suarez is under my professional care  She was admitted to the hospital on 11/29/2018 and currently remains in the hospital  Due to her medical condition, she will be unable to fly on previously scheduled flight on 12/6/18  If you have any questions or concerns, please don't hesitate to call           Sincerely,          Meli Esposito PA-C

## 2018-11-30 LAB
ANION GAP SERPL CALCULATED.3IONS-SCNC: 5 MMOL/L (ref 4–13)
BUN SERPL-MCNC: 13 MG/DL (ref 5–25)
CALCIUM SERPL-MCNC: 9 MG/DL (ref 8.3–10.1)
CHLORIDE SERPL-SCNC: 106 MMOL/L (ref 100–108)
CO2 SERPL-SCNC: 26 MMOL/L (ref 21–32)
CREAT SERPL-MCNC: 0.73 MG/DL (ref 0.6–1.3)
ERYTHROCYTE [DISTWIDTH] IN BLOOD BY AUTOMATED COUNT: 12.3 % (ref 11.6–15.1)
GFR SERPL CREATININE-BSD FRML MDRD: 109 ML/MIN/1.73SQ M
GLUCOSE SERPL-MCNC: 94 MG/DL (ref 65–140)
HCG SERPL QL: NEGATIVE
HCT VFR BLD AUTO: 35.6 % (ref 34.8–46.1)
HGB BLD-MCNC: 12.1 G/DL (ref 11.5–15.4)
MCH RBC QN AUTO: 30.3 PG (ref 26.8–34.3)
MCHC RBC AUTO-ENTMCNC: 34 G/DL (ref 31.4–37.4)
MCV RBC AUTO: 89 FL (ref 82–98)
PLATELET # BLD AUTO: 195 THOUSANDS/UL (ref 149–390)
PMV BLD AUTO: 11.2 FL (ref 8.9–12.7)
POTASSIUM SERPL-SCNC: 3.8 MMOL/L (ref 3.5–5.3)
RBC # BLD AUTO: 4 MILLION/UL (ref 3.81–5.12)
SODIUM SERPL-SCNC: 137 MMOL/L (ref 136–145)
TROPONIN I SERPL-MCNC: <0.02 NG/ML
TSH SERPL DL<=0.05 MIU/L-ACNC: 0.62 UIU/ML (ref 0.36–3.74)
WBC # BLD AUTO: 7.58 THOUSAND/UL (ref 4.31–10.16)

## 2018-11-30 PROCEDURE — 80048 BASIC METABOLIC PNL TOTAL CA: CPT | Performed by: PHYSICIAN ASSISTANT

## 2018-11-30 PROCEDURE — 84484 ASSAY OF TROPONIN QUANT: CPT | Performed by: PHYSICIAN ASSISTANT

## 2018-11-30 PROCEDURE — 84703 CHORIONIC GONADOTROPIN ASSAY: CPT | Performed by: NURSE PRACTITIONER

## 2018-11-30 PROCEDURE — 84443 ASSAY THYROID STIM HORMONE: CPT | Performed by: NURSE PRACTITIONER

## 2018-11-30 PROCEDURE — 85027 COMPLETE CBC AUTOMATED: CPT | Performed by: PHYSICIAN ASSISTANT

## 2018-11-30 PROCEDURE — 99225 PR SBSQ OBSERVATION CARE/DAY 25 MINUTES: CPT | Performed by: NURSE PRACTITIONER

## 2018-11-30 PROCEDURE — 36415 COLL VENOUS BLD VENIPUNCTURE: CPT | Performed by: PHYSICIAN ASSISTANT

## 2018-11-30 PROCEDURE — 99204 OFFICE O/P NEW MOD 45 MIN: CPT | Performed by: INTERNAL MEDICINE

## 2018-11-30 RX ORDER — ACETAMINOPHEN 325 MG/1
650 TABLET ORAL EVERY 6 HOURS PRN
Status: DISCONTINUED | OUTPATIENT
Start: 2018-11-30 | End: 2018-12-07 | Stop reason: HOSPADM

## 2018-11-30 RX ORDER — KETOROLAC TROMETHAMINE 30 MG/ML
15 INJECTION, SOLUTION INTRAMUSCULAR; INTRAVENOUS ONCE
Status: COMPLETED | OUTPATIENT
Start: 2018-11-30 | End: 2018-11-30

## 2018-11-30 RX ORDER — NAPROXEN 250 MG/1
250 TABLET ORAL ONCE
Status: COMPLETED | OUTPATIENT
Start: 2018-11-30 | End: 2018-11-30

## 2018-11-30 RX ORDER — ONDANSETRON 2 MG/ML
4 INJECTION INTRAMUSCULAR; INTRAVENOUS EVERY 6 HOURS PRN
Status: DISCONTINUED | OUTPATIENT
Start: 2018-11-30 | End: 2018-12-07 | Stop reason: HOSPADM

## 2018-11-30 RX ORDER — TRAMADOL HYDROCHLORIDE 50 MG/1
50 TABLET ORAL EVERY 6 HOURS PRN
Status: COMPLETED | OUTPATIENT
Start: 2018-11-30 | End: 2018-12-05

## 2018-11-30 RX ADMIN — ACETAMINOPHEN 650 MG: 325 TABLET ORAL at 22:38

## 2018-11-30 RX ADMIN — NAPROXEN 250 MG: 250 TABLET ORAL at 14:17

## 2018-11-30 RX ADMIN — KETOROLAC TROMETHAMINE 15 MG: 30 INJECTION, SOLUTION INTRAMUSCULAR at 11:02

## 2018-11-30 RX ADMIN — PROPRANOLOL HYDROCHLORIDE 10 MG: 10 TABLET ORAL at 10:20

## 2018-11-30 RX ADMIN — KETOROLAC TROMETHAMINE 15 MG: 30 INJECTION, SOLUTION INTRAMUSCULAR at 02:02

## 2018-11-30 NOTE — UTILIZATION REVIEW
Initial Clinical Review    Admission: Date/Time/Statement: 11/29/2018 @ 18:15   Observation    Orders Placed This Encounter   Procedures    Place in Observation (expected length of stay for this patient is less than two midnights)     Standing Status:   Standing     Number of Occurrences:   1     Order Specific Question:   Admitting Physician     Answer:   Cari Collier [[id number]]     Order Specific Question:   Level of Care     Answer:   Med Surg [16]         ED: Date/Time/Mode of Arrival:   ED Arrival Information     Expected Arrival Acuity Means of Arrival Escorted By Service Admission Type    11/29/2018 11/29/2018 12:29 Emergent Walk-In Self Emergency Medicine Emergency    Arrival Complaint    chest squeezing           Chief Complaint:   Chief Complaint   Patient presents with    Chest Pain     patient reports chest squeezing since 0200 this morning, SOB on exertion       History of Illness: 35 y o  female presents with chest pain  She describes her pain as "squeeze" and "pressure" which begins in her sternum and radiates to under her left breast   These episodes began last night, and she had about 5-6 in 10 minutes      ED Vital Signs:   ED Triage Vitals   Temperature Pulse Respirations Blood Pressure SpO2   11/29/18 1252 11/29/18 1252 11/29/18 1252 11/29/18 1252 11/29/18 1252   98 1 °F (36 7 °C) 90 18 114/56 98 %      Temp Source Heart Rate Source Patient Position - Orthostatic VS BP Location FiO2 (%)   11/29/18 1252 11/29/18 1252 11/29/18 1252 11/29/18 1252 --   Oral Monitor Lying Right arm       Pain Score       11/29/18 1251       5        Wt Readings from Last 1 Encounters:   04/20/17 54 5 kg (120 lb 4 oz)       Vital Signs (abnormal): HR 64 - 122, RR 16 - 27    Abnormal Labs/Diagnostic Test Results: Na 135    EKG: Normal sinus rhythm, Nonspecific T wave abnormality    ED Treatment:   Medication Administration from 11/29/2018 1211 to 11/30/2018 0901       Date/Time Order Dose Route Action Action by Comments     11/29/2018 1738 acetaminophen (TYLENOL) tablet 975 mg 975 mg Oral Given Denita Danielson, RN      11/30/2018 0202 ketorolac (TORADOL) injection 15 mg 15 mg Intravenous Given Sharath Ralph RN           Past Medical/Surgical History:   Diagnosis    Acute Lyme disease    Endometriosis    Endometriosis    Mitral valve regurgitation    MVP (mitral valve prolapse)    PVC (premature ventricular contraction)    SVT [facility]supraventricular tachycardia) ([facility])       Admitting Diagnosis: Chest pain [R07 9]    Age/Sex: 35 y o  female    Assessment/Plan:     * Chest pain   Assessment & Plan     · Unclear etiology  · First troponin negative - will trend  · CXR official read pending  · ECG with TWI in Leads III and V1  · ACS rule out  · Given her cardiac history, will appreciate Cardiology consult  · Telemetry  · She is not hypoxic or tachycardic (she did not take her propanolol today)  She denies OCP use  She denies calf pain or tenderness to palpation bilaterally  · Her outpatient Cardiologist was considering Cardiac MRI  · Will check D-dimer      Inappropriate sinus tachycardia   Assessment & Plan     · Follows with Dr Thony Walter, Dr Kevan Silverio of [place] [address], and Dr Dre Baig of [facility] as outpatient  · She is s/p SVT ablation in 2014  · She was intolerant to metoprolol succinate in the past (hypotension, lightheadedness) and could nto take Bystolic (due to cost)   She was recently placed on 10mg daily propanolol as outpatient and has been compliant with this       Impaired left ventricular function   Assessment & Plan     · Echo from November 16 2018 with LVEF 50% (was 45% prior per outpatient Cardiology records)      Moderate mitral valve regurgitation   Assessment & Plan     · Recent Echo revealed mildly myxomatous mitral valve with mild mitral valve prolapse and moderate regurgitation (this is mildly increased when compared to prior 2014 echo)      Vitamin D deficiency   Assessment & Plan     Continue supplementation          VTE Prophylaxis: non per admission checklist, patient is low-risk  Code Status: FULL CODE - I discussed with the patient personally on admission   POLST: POLST form is not discussed and not completed at this time      Anticipated Length of Stay:  Patient will be admitted on an Observation basis with an anticipated length of stay of  < 2 midnights     Justification for Hospital Stay: plan as per above          Admission Orders:  Observation/Tele  Continuous Cardiac Monitoring  Serial Cardiac Enzymes q3h x 3  Consult Cardiology r/e chest pain, mitral regurgitation  Bilateral Sequential Compression Device  Orthostatic Vital Signs    Scheduled Meds:   Current Facility-Administered Medications:  propranolol 10 mg Oral Daily

## 2018-11-30 NOTE — ASSESSMENT & PLAN NOTE
· Follows with Dr Johnson Emmanuel of [facility] , Dr Joon Morelos of [facility], and Dr Patrizia Sanchez of [facility] as outpatient  · She is s/p SVT ablation in 2014  · She was intolerant to metoprolol succinate in the past (hypotension, lightheadedness) and could not take Bystolic (due to cost)   She was recently placed on 10mg daily propanolol as outpatient and has been compliant with this   · Will hold propanolol for now  · Suspect POTS  · Per cardiology liberalized salt intake   · Stay hydrated  · Encourage exercise program to increase stamina   · Continue to monitor telemetry overnight   · Encourage patient to get OOB and ambulate to see if HR elevates or patient becomes symptomatic

## 2018-11-30 NOTE — CONSULTS
Consultation - Cardiology Team One  Jeni Daley 35 y o  female MRN: [id number]  Unit/Bed#: CRB Encounter: [id number]    Inpatient consult to Cardiology  Consult performed by: Letha Dumont ordered by: Kendell Welch          Physician Requesting Consult: Adriana Burkett,   Reason for Consult / Principal Problem: Chest pain      Assessment  Chest pain-neg trops  Atypical    Lightheaded/dizzy-check orthostatics  She may not need the low dose propanolol  Palpitations, intermittent-on propanolol 10 mg daily recently started about 2 weeks ago  Inappropriate sinus tachycardia  PVC Ablation 2014, at [address] mitral regurgitation  PVCs  Vitamin-D insufficiency-9--- when recently tested a few weeks ago  Past medical history:  Hypotension, Lyme disease      Plan  Will obtain records from her primary cardiologist Dr Pilar Hinojosa  She reports she had an echo within the last 2 weeks and a Holter monitor  Will get a qualitative HCG  Orthostatic BPs  Telemetry demonstrates normal sinus rhythm with occasional sinus tachycardia  TSH-recently tested few weeks ago and was 0 92, free T4 1 0      History of Present Illness   HPI: Jeni Daley is a 35y o  year old female who has a history of SVT with prior ablation 2014  She is followed with Dr Ricardo Castillo of [facility] as her primary cardiologist   She has also seen Dr Elaine Guan from [facility]/ and Dr Eladia Holloway of [place] [address] back in 2014  She presents with  lightheaded and dizziness  EKG, reviewed personally-November 29th 1258 11/29/18 NSR 78 bpm  Non specific STTW changes  Poor anterior R wave progression  repeat study:  Serial troponins x4 been normal  Potassium satisfactory, 4 1 on admission  CBC:  Normal      She was recently placed on propanolol 10 mg daily as an outpatient      She is intolerant to Toprol as she developed hypotension and lightheadedness in the past     She was unable to take Bystolic due to cost     Family history:  Paternal grandfather sudden cardiac death in his 62s  Etiology unclear  Some type of cancer  Social history:  1 cup of caffeinated coffee daily  Rare alcohol-last a beer about a week ago  Denies drug use  She tells me she was told not to exercise  It is not clear who advised her of this             Per Care Everywhere   3/11/18 Stress echo  Alcides protocol x 6 mins-  Achieved 91% MPHR  At least mild-mod Mitral regurgitation  Non diagnostic ECG for ischemia due to basliene ST-TW changes  Negative exercise stress echo for ischemia    Echocardiogram 11/16/18 per Dr Cameron Craft:  Normal left ventricular size and wall thickness  Low normal left ventricular systolic function, EF 77%  Mild global hypokinesis  Normal right ventricular size thickness and systolic function  Biatrial size normal  Aortic valve trileaflet without AI  Mitral valve myxomatous, mild bileaflet prolapse with moderate MR  Tricuspid valve with trace to mild TR  PA systolic pressure estimated at 19 mm Hg  Aortic root, ascending aorta, aortic arch and abdominal aorta all normal in size  Compared to prior study December 2014 and March 2015, mitral regurgitation has mildly increased    Vitamin-D hydroxy:  Was 9    Holter monitor times 47 hours 32 minutes 11/14/18  Dominant rhythm is sinus rhythm  Average heart rate 88 beats per minute  Minimum heart rate 54 beats per minute, maximum heart rate 184 beats per minute  No significant pauses  Total supraventricular ectopic burden less than 0 1%  Total PVC burden 0 1%  Isolated PVCs noted  No complex arrhythmias noted  In summary:  Minor atrial and ventricular     Review of Systems   Constitution: Negative for chills and weakness  Cardiovascular: Negative for chest pain, claudication, cyanosis, dyspnea on exertion, irregular heartbeat, leg swelling, near-syncope, orthopnea, palpitations, paroxysmal nocturnal dyspnea and syncope          Occasional palpitation with exertion i e  Going up and down steps   Respiratory: Negative for cough and shortness of breath  Gastrointestinal: Negative for heartburn and nausea  Neurological: Positive for dizziness and light-headedness  Negative for focal weakness and headaches  All other systems reviewed and are negative  Historical Information   Past Medical History:   Diagnosis Date    Acute Lyme disease     Endometriosis     Endometriosis     Mitral valve regurgitation     MVP (mitral valve prolapse)     PVC (premature ventricular contraction)     SVT (supraventricular tachycardia) (HCC)      Past Surgical History:   Procedure Laterality Date    ABLATION OF DYSRHYTHMIC FOCUS      DIAGNOSTIC LAPAROSCOPY      for endometriosis    LAPAROSCOPY FOR ECTOPIC PREGNANCY       History   Alcohol Use No     History   Drug Use No     History   Smoking Status    Former Smoker    Quit date: 8/30/2013   Smokeless Tobacco    Never Used     Family History: History reviewed  No pertinent family history  Meds/Allergies   all current active meds have been reviewed, current meds:   Current Facility-Administered Medications   Medication Dose Route Frequency    propranolol (INDERAL) tablet 10 mg  10 mg Oral Daily    and PTA meds:   Prior to Admission Medications   Prescriptions Last Dose Informant Patient Reported? Taking?   propranolol (INDERAL) 10 mg tablet   Yes Yes   Sig: Take 10 mg by mouth daily      Facility-Administered Medications: None          No Known Allergies    Objective   Vitals: Blood pressure 107/56, pulse 64, temperature 98 1 °F (36 7 °C), temperature source Oral, resp  rate 13, last menstrual period 11/22/2018, SpO2 97 %, currently breastfeeding  ,     There is no height or weight on file to calculate BMI ,     Systolic (62JDM), VEV:220 , Min:99 , ZFR:676     Diastolic (10XWC), MAHENDRA:38, Min:56, Max:72          No intake or output data in the 24 hours ending 11/30/18 0738  Weight (last 2 days)     None        Invasive Devices     Peripheral Intravenous Line            Peripheral IV 11/30/18 Left Antecubital less than 1 day                  Physical Exam   Constitutional: She is oriented to person, place, and time  No distress  HENT:   Head: Normocephalic and atraumatic  Eyes: Conjunctivae and EOM are normal    Neck: Normal range of motion  Neck supple  Cardiovascular: Normal rate, regular rhythm, normal heart sounds and intact distal pulses  Pulmonary/Chest: Effort normal and breath sounds normal    Abdominal: Soft  Bowel sounds are normal    Musculoskeletal: Normal range of motion  Neurological: She is alert and oriented to person, place, and time  Skin: Skin is warm and dry  She is not diaphoretic  Psychiatric: She has a normal mood and affect  Nursing note and vitals reviewed  LABORATORY RESULTS:    Results from last 7 days  Lab Units 11/30/18  0410 11/30/18  0249 11/30/18  0008   TROPONIN I ng/mL <0 02 <0 02 <0 02     CBC with diff:   Results from last 7 days  Lab Units 11/30/18  0410 11/29/18  1301   WBC Thousand/uL 7 58 8 16   HEMOGLOBIN g/dL 12 1 12 9   HEMATOCRIT % 35 6 39 6   MCV fL 89 90   PLATELETS Thousands/uL 195 206   MCH pg 30 3 29 3   MCHC g/dL 34 0 32 6   RDW % 12 3 12 3   MPV fL 11 2 10 9   NRBC AUTO /100 WBCs  --  0       CMP:  Results from last 7 days  Lab Units 11/30/18  0410 11/29/18  1301   POTASSIUM mmol/L 3 8 4 1   CHLORIDE mmol/L 106 105   CO2 mmol/L 26 26   BUN mg/dL 13 7   CREATININE mg/dL 0 73 0 73   CALCIUM mg/dL 9 0 9 4   AST U/L  --  11   ALT U/L  --  20   ALK PHOS U/L  --  55   EGFR ml/min/1 73sq m 109 109       BMP:  Results from last 7 days  Lab Units 11/30/18  0410 11/29/18  1301   POTASSIUM mmol/L 3 8 4 1   CHLORIDE mmol/L 106 105   CO2 mmol/L 26 26   BUN mg/dL 13 7   CREATININE mg/dL 0 73 0 73   CALCIUM mg/dL 9 0 9 4          Lab Results   Component Value Date    NTBNP 70 11/29/2018               Imaging: I have personally reviewed pertinent reports     and I have personally reviewed pertinent films in PACS  X-ray Chest 2 Views  Result Date: 11/29/2018  Narrative: CHEST INDICATION:   chest pain  COMPARISON:  None EXAM PERFORMED/VIEWS:  XR CHEST PA & LATERAL Images: 2 FINDINGS:  Lungs adequately aerated  Cardiomediastinal silhouette appears unremarkable  The lungs are clear  No pneumothorax or pleural effusion  Osseous structures appear within normal limits for patient age  Impression: No acute cardiopulmonary disease  Workstation performed: [id number]         Assessment  Principal Problem:    Chest pain  Active Problems:    Inappropriate sinus tachycardia    Impaired left ventricular function    Moderate mitral valve regurgitation    Vitamin D deficiency        Thank you for allowing us to participate in this patient's care  T  Counseling / Coordination of Care  Total floor / unit time spent today 45 minutes  Greater than 50% of total time was spent with the patient and / or family counseling and / or coordination of care  A description of the counseling / coordination of care: Review of history, current assessment, development of a plan  Code Status: Level 1 - Full Code    ** Please Note: Dragon 360 Dictation voice to text software may have been used in the creation of this document   **

## 2018-11-30 NOTE — QUICK NOTE
Spoke with patients cardiology office Jamee (nurse with Dr Glenn Franks)  She spoke with Dr Glenn Franks and he advised an EP consult for inappropriate ST, with borderline normal EF s/p PVC ablation; holter monitor completed on Nov 14th with max  at 8am at rest  Will consult EP for eval and recommendations  Discussed with patient

## 2018-11-30 NOTE — PLAN OF CARE
Problem: Potential for Falls  Goal: Patient will remain free of falls  INTERVENTIONS:  - Assess patient frequently for physical needs  -  Identify cognitive and physical deficits and behaviors that affect risk of falls    -  [place] fall precautions as indicated by assessment   - Educate patient/family on patient safety including physical limitations  - Instruct patient to call for assistance with activity based on assessment  - Modify environment to reduce risk of injury  - Consider OT/PT consult to assist with strengthening/mobility   Outcome: Progressing      Problem: PAIN - ADULT  Goal: Verbalizes/displays adequate comfort level or baseline comfort level  Interventions:  - Encourage patient to monitor pain and request assistance  - Assess pain using appropriate pain scale  - Administer analgesics based on type and severity of pain and evaluate response  - Implement non-pharmacological measures as appropriate and evaluate response  - Consider cultural and social influences on pain and pain management  - Notify physician/advanced practitioner if interventions unsuccessful or patient reports new pain  Outcome: Progressing      Problem: INFECTION - ADULT  Goal: Absence or prevention of progression during hospitalization  INTERVENTIONS:  - Assess and monitor for signs and symptoms of infection  - Monitor lab/diagnostic results  - Monitor all insertion sites, i e  indwelling lines, tubes, and drains  - Monitor endotracheal (as able) and nasal secretions for changes in amount and color  - [place] appropriate cooling/warming therapies per order  - Administer medications as ordered  - Instruct and encourage patient and family to use good hand hygiene technique  - Identify and instruct in appropriate isolation precautions for identified infection/condition  Outcome: Progressing    Goal: Absence of fever/infection during neutropenic period  INTERVENTIONS:  - Monitor WBC  - Implement neutropenic guidelines  Outcome: Progressing      Problem: SAFETY ADULT  Goal: Patient will remain free of falls  INTERVENTIONS:  - Assess patient frequently for physical needs  -  Identify cognitive and physical deficits and behaviors that affect risk of falls    -  [place] fall precautions as indicated by assessment   - Educate patient/family on patient safety including physical limitations  - Instruct patient to call for assistance with activity based on assessment  - Modify environment to reduce risk of injury  - Consider OT/PT consult to assist with strengthening/mobility   Outcome: Progressing    Goal: Maintain or return to baseline ADL function  INTERVENTIONS:  -  Assess patient's ability to carry out ADLs; assess patient's baseline for ADL function and identify physical deficits which impact ability to perform ADLs (bathing, care of mouth/teeth, toileting, grooming, dressing, etc )  - Assess/evaluate cause of self-care deficits   - Assess range of motion  - Assess patient's mobility; develop plan if impaired  - Assess patient's need for assistive devices and provide as appropriate  - Encourage maximum independence but intervene and supervise when necessary  ¯ Involve family in performance of ADLs  ¯ Assess for home care needs following discharge   ¯ Request OT consult to assist with ADL evaluation and planning for discharge  ¯ Provide patient education as appropriate  Outcome: Progressing    Goal: Maintain or return mobility status to optimal level  INTERVENTIONS:  - Assess patient's baseline mobility status (ambulation, transfers, stairs, etc )    - Identify cognitive and physical deficits and behaviors that affect mobility  - Identify mobility aids required to assist with transfers and/or ambulation (gait belt, sit-to-stand, lift, walker, cane, etc )  - [place] fall precautions as indicated by assessment  - Record patient progress and toleration of activity level on Mobility SBAR; progress patient to next Phase/Stage  - Instruct patient to call for assistance with activity based on assessment  - Request Rehabilitation consult to assist with strengthening/weightbearing, etc   Outcome: Progressing      Problem: DISCHARGE PLANNING  Goal: Discharge to home or other facility with appropriate resources  INTERVENTIONS:  - Identify barriers to discharge w/patient and caregiver  - Arrange for needed discharge resources and transportation as appropriate  - Identify discharge learning needs (meds, wound care, etc )  - Arrange for interpretive services to assist at discharge as needed  - Refer to Case Management Department for coordinating discharge planning if the patient needs post-hospital services based on physician/advanced practitioner order or complex needs related to functional status, cognitive ability, or social support system  Outcome: Progressing      Problem: Knowledge Deficit  Goal: Patient/family/caregiver demonstrates understanding of disease process, treatment plan, medications, and discharge instructions  Complete learning assessment and assess knowledge base    Interventions:  - Provide teaching at level of understanding  - Provide teaching via preferred learning methods  Outcome: Progressing

## 2018-11-30 NOTE — ASSESSMENT & PLAN NOTE
· Unclear etiology  · Trop negative x3  · CXR negative  · ECG with TWI in Leads III and V1  · Appreciate cardiology consult  · No stress test indicated at this time  · Hold propanolol  · Telemetry  · Her outpatient Cardiologist was considering Cardiac MRI  · Ddimer negative

## 2018-12-01 PROBLEM — Z87.59 HISTORY OF ECTOPIC PREGNANCY: Status: ACTIVE | Noted: 2018-12-01

## 2018-12-01 PROCEDURE — 99225 PR SBSQ OBSERVATION CARE/DAY 25 MINUTES: CPT | Performed by: INTERNAL MEDICINE

## 2018-12-01 RX ADMIN — ACETAMINOPHEN 650 MG: 325 TABLET ORAL at 23:51

## 2018-12-01 RX ADMIN — ACETAMINOPHEN 650 MG: 325 TABLET ORAL at 13:06

## 2018-12-01 NOTE — PROGRESS NOTES
[facility] Hospitalist Service - Internal Medicine Progress Note       PATIENT INFORMATION      Patient: Beth Ramey 35 y o  female   MRN: [id number]  PCP: Bertin Honeycutt MD  Unit/Bed#: CW2 217-03 Encounter: [id number]  Date Of Visit: 12/01/18       ASSESSMENTS & PLAN     Inappropriate sinus tachycardia - Atypical chest pain   Assessment & Plan    - outpatient out-ofnetwork cardiologists are Dr Rayshawn Holder (@ [facility]) and Dr Yeny Caba (@ [facility]) per prior documentation  - notable history of ablation in 2014 @ [facility]  - previously intolerant to Metoprolol due to symptomatic hypotension/dizziness - prescribed Bystolic that was too expensive for her - for recent course of Propranolol led to worsening abdominal discomfort/chest tightness and her heart rates at rest now are currently stable off beta-blockade - upon standing up and ambulation however, she complains of reproducible dizziness and lightheadedness coupled with frustration/anxiety  - appreciate general cardiology input who are considering Postural Orthostatic Tachycardia Syndrome (POTS) vs recurrence of inappropriate sinus tachycardia   - patient was counseled by cardiology on adequate hydration, liberalizing/increasing salt intake, and increased exercise for stamina  - as aforementioned, dizziness/tachycardia worsens with even simple ambulation currently - patient was told by outpatient out-of-network cardiologist to come in to the hospital for Electrophysiology evaluation (consult pending)   - orthostatic vital signs negative last checked yesterday were negative  - TSH within normal limits - serum potassium normal - check serum magnesium - CXR negative for acute cardiopulmonary etiology - a stress echocardiogram earlier this year (March 2018) revealed EF of 50-55% with mild-moderate MR but no evidence of ischemia on exercise stress portion per reading cardiologist  - serial troponins negative and proBNP on admission unremarkable   - continue telemetry monitoring - patient reassured      History of ectopic pregnancy   Assessment & Plan    - pregnancy test this admission negative       VTE Prophylaxis:  Ambulatory      SUBJECTIVE     Seen/examined this afternoon  Patient notes worsening dizziness/palpitations upon attempting to get up and ambulate around her hospital bed, however, at rest she denies any similar symptoms  She acknowledges that she is intermittently anxious which may be contributory in some way  Denies any actual chest pain or shortness of breath at this time  OBJECTIVE     Vitals:   Temp (24hrs), Av 3 °F (36 8 °C), Min:97 7 °F (36 5 °C), Max:98 6 °F (37 °C)    Temp:  [97 7 °F (36 5 °C)-98 6 °F (37 °C)] 98 5 °F (36 9 °C)  HR:  [] 73  Resp:  [18-20] 19  BP: (103-123)/(51-65) 106/51  SpO2:  [98 %-99 %] 98 %  Body mass index is 23 35 kg/m²         Physical Exam:     GENERAL:  Well-developed/nourished - no immediate distress  HEAD:  Normocephalic - atraumatic  EYES: PERRL - EOMI   MOUTH:  Mucosa moist  NECK:  Supple - full range of motion  CARDIAC:  Regular rate/rhythm currently - S1/S2 positive  PULMONARY:  Clear breath sounds bilaterally - nonlabored respirations  ABDOMEN:  Soft - nontender/nondistended - active bowel sounds  MUSCULOSKELETAL:  Motor strength/range of motion intact  NEUROLOGIC:  Alert/oriented x 3  SKIN:  Age-appropriate wrinkles/blemishes   PSYCHIATRIC:  Mood/affect anxious      ADDITIONAL DATA       Labs & Recent Cultures:       Results from last 7 days  Lab Units 180 18  1301   WBC Thousand/uL 7 58 8 16   HEMOGLOBIN g/dL 12 1 12 9   HEMATOCRIT % 35 6 39 6   PLATELETS Thousands/uL 195 206   NEUTROS PCT %  --  61   LYMPHS PCT %  --  32   MONOS PCT %  --  6   EOS PCT %  --  1       Results from last 7 days  Lab Units 18  0410 18  1301   POTASSIUM mmol/L 3 8 4 1   CHLORIDE mmol/L 106 105   CO2 mmol/L 26 26   BUN mg/dL 13 7   CREATININE mg/dL 0 73 0 73   CALCIUM mg/dL 9 0 9  4   ALK PHOS U/L  --  55   ALT U/L  --  20   AST U/L  --  11         Last 24 Hours Medication List:     Current Facility-Administered Medications:  acetaminophen 650 mg Oral Q6H PRN Josefa [place], PA-C   ondansetron 4 mg Intravenous Q6H PRN Josefa [place], PA-C   traMADol 50 mg Oral Q6H PRN Josefa [place], PA-C          Time Spent for Care: 32 minutes  More than 50% of total time spent on counseling and coordination of care as described above  Current Length of Stay: 0 day(s)      Code Status: Level 1 - Full Code         ** Please Note: This note is constructed using a voice recognition dictation system   **

## 2018-12-01 NOTE — ASSESSMENT & PLAN NOTE
· POTS/IST  · outpatient out-of-network cardiologists are Dr Modesto Kelly (@ [place] [id number] ) and Dr Sunshine Ariza (@ [facility]) per prior documentation  · notable history of ablation in 2014 @ [facility]  · intolerant to beta blockers  · EP recommending starting new medication- ivabradine  Our pharmacy does not carry this   Discussed with pharmacist today- medication approved but may take 24-48 hours to obtain  · EP prefers to keep patient inpatient until symptoms better controlled  · Patient has an upcoming flight Thursday which may need to be cancelled- will provide medical note for cancellation

## 2018-12-01 NOTE — ASSESSMENT & PLAN NOTE
· Echo from November 16 2018 with LVEF 50% (was 45% prior per outpatient Cardiology records)  · Patient to follow up with PCP after discharge

## 2018-12-02 LAB
ATRIAL RATE: 115 BPM
ATRIAL RATE: 130 BPM
ATRIAL RATE: 143 BPM
P AXIS: 75 DEGREES
P AXIS: 79 DEGREES
P AXIS: 80 DEGREES
PR INTERVAL: 134 MS
PR INTERVAL: 136 MS
PR INTERVAL: 166 MS
QRS AXIS: 71 DEGREES
QRS AXIS: 72 DEGREES
QRS AXIS: 73 DEGREES
QRSD INTERVAL: 54 MS
QRSD INTERVAL: 64 MS
QRSD INTERVAL: 72 MS
QT INTERVAL: 244 MS
QT INTERVAL: 246 MS
QT INTERVAL: 316 MS
QTC INTERVAL: 359 MS
QTC INTERVAL: 379 MS
QTC INTERVAL: 437 MS
T WAVE AXIS: -82 DEGREES
T WAVE AXIS: 258 DEGREES
T WAVE AXIS: 269 DEGREES
VENTRICULAR RATE: 115 BPM
VENTRICULAR RATE: 130 BPM
VENTRICULAR RATE: 143 BPM

## 2018-12-02 PROCEDURE — 99232 SBSQ HOSP IP/OBS MODERATE 35: CPT | Performed by: INTERNAL MEDICINE

## 2018-12-02 PROCEDURE — 93010 ELECTROCARDIOGRAM REPORT: CPT | Performed by: INTERNAL MEDICINE

## 2018-12-02 PROCEDURE — 93005 ELECTROCARDIOGRAM TRACING: CPT

## 2018-12-02 RX ADMIN — TRAMADOL HYDROCHLORIDE 50 MG: 50 TABLET, COATED ORAL at 19:54

## 2018-12-02 NOTE — SOCIAL WORK
CM met with patient at bedside to explain CM role and discuss d/c plan  Pt lives with her  Enid Coto [phone] in a 2-story home with [address]  Pt is independent with ADLs  No DME  No history of HHC or STR  Pt drives and is not employed at this time  Preferred Rx:  [facility] [address] in [place]  No history of MH or D/A treatment reported  CM presented Pt with Observation Notice and reviewed  Patient refused to sign  CM reviewed d/c planning process including the following: identifying help at home, patient preference for d/c planning needs, Discharge Lounge, [facility] Meds to Bed program, availability of treatment team to discuss questions or concerns patient and/or family may have regarding understanding medications and recognizing signs and symptoms once discharged  CM also encouraged patient to follow up with all recommended appointments after discharge  Patient advised of importance for patient and family to participate in managing patients medical well being

## 2018-12-02 NOTE — UTILIZATION REVIEW
Continued Stay Review    Observation as of 11/29    Date: 12/01    Vital Signs: /59 (BP Location: Right arm)   Pulse 97   Temp 99 °F (37 2 °C) (Oral)   Resp 19   Ht 5' 1" (1 549 m)   Wt 56 1 kg (123 lb 9 6 oz)   LMP 11/22/2018 (Approximate)   SpO2 97%   BMI 23 35 kg/m²     Medications:   Scheduled Meds:   acetaminophen 650 mg Oral Q6H PRN   ondansetron 4 mg Intravenous Q6H PRN   traMADol 50 mg Oral Q6H PRN       Abnormal Labs/Diagnostic Results: Trop neg x4, no labs 12/1  EKG 12/2 -- Sinus tachycardia  Right atrial enlargement  ST & T wave abnormality, consider inferolateral ischemia  Abnormal ECG    KASI repeat 12/2  --  Right atrial enlargement  Septal infarct , age undetermined  ST & T wave abnormality, consider inferior ischemia  Abnormal ECG  When compared with ECG of 02-DEC-2018 11:41, (unconfirmed)    EKG -- 12/2 --  Sinus tachycardia  ST & T wave abnormality, consider inferior ischemia  ST & T wave abnormality, consider anterolateral ischemia  Abnormal ECG  When compared with ECG of 02-DEC-2018 11:42, (unconfirmed)  No significant change was found    Age/Sex: 35 y o  female     Assessment/Plan:        Inappropriate sinus tachycardia - Atypical chest pain   Assessment & Plan     - outpatient out-of-network cardiologists are Dr Viry Gann (@ [facility]) and Dr Gertrude Herrera (@ [facility]) per prior documentation  - notable history of ablation in 2014 @ [facility]  - previously intolerant to Metoprolol due to symptomatic hypotension/dizziness - prescribed Bystolic that was too expensive for her - for recent course of Propranolol led to worsening abdominal discomfort/chest tightness and her heart rates at rest now are currently stable off beta-blockade - upon standing up and ambulation however, she complains of reproducible dizziness and lightheadedness coupled with frustration/anxiety  - appreciate general cardiology input who are considering Postural Orthostatic Tachycardia Syndrome (POTS) vs recurrence of inappropriate sinus tachycardia   - patient was counseled by cardiology on adequate hydration, liberalizing/increasing salt intake, and increased exercise for stamina  - as aforementioned, dizziness/tachycardia worsens with even simple ambulation currently - patient was told by outpatient out-of-network cardiologist to come in to the hospital for Electrophysiology evaluation (consult pending)   - orthostatic vital signs negative last checked yesterday were negative  - TSH within normal limits - serum potassium normal - check serum magnesium - CXR negative for acute cardiopulmonary etiology - a stress echocardiogram earlier this year (March 2018) revealed EF of 50-55% with mild-moderate MR but no evidence of ischemia on exercise stress portion per reading cardiologist  - serial troponins negative and proBNP on admission unremarkable   - continue telemetry monitoring - patient reassured

## 2018-12-02 NOTE — PROGRESS NOTES
[facility] Hospitalist Service - Internal Medicine Progress Note       PATIENT INFORMATION      Patient: Shira Zamudio 35 y o  female   MRN: [id number]  PCP: Aron Hay MD  Unit/Bed#: CW2 217-03 Encounter: [id number]  Date Of Visit: 12/02/18       ASSESSMENTS & PLAN     Inappropriate sinus tachycardia - Atypical chest pain   Assessment & Plan    - outpatient out-of-network cardiologists are Dr Julian Grimaldo (@ [facility]) and Dr Kimberlee Vizcarra (@ [facility]) per prior documentation  - notable history of ablation in 2014 @ [facility]  - previously intolerant to Metoprolol due to symptomatic hypotension/dizziness - prescribed Bystolic that was too expensive for her - for recent course of Propranolol led to worsening abdominal discomfort/chest tightness and her heart rates at rest now are currently stable off beta-blockade - upon standing up and ambulation however, she complains of reproducible dizziness and lightheadedness coupled with frustration/anxiety  - appreciate general cardiology input who are considering Postural Orthostatic Tachycardia Syndrome (POTS) vs recurrence of inappropriate sinus tachycardia - patient was counseled by cardiology on adequate hydration, liberalizing/increasing salt intake, and increased exercise for stamina  - as aforementioned, dizziness/tachycardia worsens with even simple ambulation - patient was told by outpatient out-of-network cardiologist to come in to the hospital for Electrophysiology evaluation (consult pending)   - orthostatic vital signs negative in regards to BP fluctuation but clinically significant tachycardia evident from lying to sitting to standing   - this morning while brushing teeth she developed episodes of persistent tachycardia caught on telemetry and EKG by nurse - spontaneously back to normal rates at rest   - TSH within normal limits - serum potassium normal - await serum magnesium - CXR negative for acute cardiopulmonary etiology - a stress echocardiogram earlier this year (2018) revealed EF of 50-55% with mild-moderate MR but no evidence of ischemia on exercise stress portion per reading cardiologist  - continue telemetry monitoring - patient reassured      History of ectopic pregnancy   Assessment & Plan    - pregnancy test this admission negative       VTE Prophylaxis:  Ambulatory      SUBJECTIVE     Seen/examined earlier this afternoon with family bedside  No acute overnight events however this morning with an standing up to brush her teeth, she felt a fluttering sensation in her chest which was fortunately caught on telemetry monitoring as well as subsequent EKG to be tachycardia and spontaneously converted to normal rates upon rest again  Through the brief episode, she denied any nausea/vomiting, headaches, blurred vision, or other constitutional symptoms  Upon my encounter, she remains at rest without acute issues  OBJECTIVE     Vitals:   Temp (24hrs), Av 2 °F (36 8 °C), Min:97 8 °F (36 6 °C), Max:99 °F (37 2 °C)    Temp:  [97 8 °F (36 6 °C)-99 °F (37 2 °C)] 99 °F (37 2 °C)  HR:  [] 97  Resp:  [18-19] 19  BP: (105-124)/(55-67) 116/59  SpO2:  [96 %-98 %] 97 %  Body mass index is 23 35 kg/m²       Input and Output Summary (last 24 hours):     No intake or output data in the 24 hours ending 18 1633    Physical Exam:     GENERAL:  Well-developed/nourished - no acute distress  HEAD:  Normocephalic - atraumatic  EYES: PERRL - EOMI   MOUTH:  Mucosa moist  NECK:  Supple - full range of motion  CARDIAC:  Regular rate/rhythm - S1/S2 positive  PULMONARY:  Clear to auscultation bilaterally - nonlabored respirations  ABDOMEN:  Soft - nontender/nondistended - active bowel sounds  MUSCULOSKELETAL:  Motor strength/range of motion intact  NEUROLOGIC:  Alert/oriented x 3   SKIN:  Age-appropriate wrinkles/blemishes   PSYCHIATRIC:  Mood/affect pleasant today      ADDITIONAL DATA       Labs & Recent Cultures:       Results from last 7 days  Lab Units 11/30/18 0410 11/29/18  1301   WBC Thousand/uL 7 58 8 16   HEMOGLOBIN g/dL 12 1 12 9   HEMATOCRIT % 35 6 39 6   PLATELETS Thousands/uL 195 206   NEUTROS PCT %  --  61   LYMPHS PCT %  --  32   MONOS PCT %  --  6   EOS PCT %  --  1       Results from last 7 days  Lab Units 11/30/18 0410 11/29/18  1301   POTASSIUM mmol/L 3 8 4 1   CHLORIDE mmol/L 106 105   CO2 mmol/L 26 26   BUN mg/dL 13 7   CREATININE mg/dL 0 73 0 73   CALCIUM mg/dL 9 0 9 4   ALK PHOS U/L  --  55   ALT U/L  --  20   AST U/L  --  11         Last 24 Hours Medication List:     Current Facility-Administered Medications:  acetaminophen 650 mg Oral Q6H PRN Hosie Speak, PA-C   ondansetron 4 mg Intravenous Q6H PRN Hosie Speak, PA-C   traMADol 50 mg Oral Q6H PRN Hosie Speak, PA-C          Time Spent for Care: 32 minutes  More than 50% of total time spent on counseling and coordination of care as described above  Current Length of Stay: 0 day(s)      Code Status: Level 1 - Full Code         ** Please Note: This note is constructed using a voice recognition dictation system   **

## 2018-12-03 LAB — MAGNESIUM SERPL-MCNC: 2.1 MG/DL (ref 1.6–2.6)

## 2018-12-03 PROCEDURE — 99222 1ST HOSP IP/OBS MODERATE 55: CPT | Performed by: INTERNAL MEDICINE

## 2018-12-03 PROCEDURE — 83735 ASSAY OF MAGNESIUM: CPT | Performed by: INTERNAL MEDICINE

## 2018-12-03 PROCEDURE — 99232 SBSQ HOSP IP/OBS MODERATE 35: CPT | Performed by: INTERNAL MEDICINE

## 2018-12-03 RX ADMIN — TRAMADOL HYDROCHLORIDE 50 MG: 50 TABLET, COATED ORAL at 23:17

## 2018-12-03 NOTE — PROGRESS NOTES
Paged cardiology and informed them that Corlanor is non-formulary  Cardiology fellow said they would call [facility] to place verbal order so pt could start to receive Corlanor while inpatient

## 2018-12-03 NOTE — CASE MANAGEMENT
Deshaun Yousif, RN Registered Nurse Signed   Utilization Review Date of Service: 11/30/2018  8:10 AM         Initial Clinical Review     Admission: Date/Time/Statement: 11/29/2018 @ 18:15   Observation -- CONVERTED TO INPATIENT 12/02 @ [address] [facility] EVALUATION    12/02/18 1919  Inpatient Admission Once     Transfer Service: General Medicine       Question Answer Comment   Admitting Physician Ange Lua    Level of Care Med Surg    Estimated length of stay More than 2 Midnights    Certification I certify that inpatient services are medically necessary for this patient for a duration of greater than two midnights  See H&P and MD Progress Notes for additional information about the patient's course of treatment  12/02/18 1919        ED: Date/Time/Mode of Arrival:             ED Arrival Information      Expected Arrival Acuity Means of Arrival Escorted By Service Admission Type     11/29/2018 11/29/2018 12:29 Emergent Walk-In Self Emergency Medicine Emergency     Arrival Complaint     chest squeezing              Chief Complaint:        Chief Complaint   Patient presents with    Chest Pain       patient reports chest squeezing since 0200 this morning, SOB on exertion         History of Illness: 35 y  o  female presents with chest pain   She describes her pain as "squeeze" and "pressure" which begins in her sternum and radiates to under her left breast   These episodes began last night, and she had about 5-6 in 10 minutes      ED Vital Signs:            ED Triage Vitals   Temperature Pulse Respirations Blood Pressure SpO2   11/29/18 1252 11/29/18 1252 11/29/18 1252 11/29/18 1252 11/29/18 1252   98 1 °F (36 7 °C) 90 18 114/56 98 %       Temp Source Heart Rate Source Patient Position - Orthostatic VS BP Location FiO2 (%)   11/29/18 1252 11/29/18 1252 11/29/18 1252 11/29/18 1252 --   Oral Monitor Lying Right arm         Pain Score           11/29/18 1251           5                Wt Readings from Last 1 Encounters:   04/20/17 54 5 kg (120 lb 4 oz)         Vital Signs (abnormal): HR 64 - 122, RR 16 - 27     Abnormal Labs/Diagnostic Test Results: Na 135     EKG: Normal sinus rhythm, Nonspecific T wave abnormality     ED Treatment:                 Date/Time Order Dose Route Action       11/29/2018 1738 acetaminophen (TYLENOL) tablet 975 mg 975 mg Oral Given       11/30/2018 0202 ketorolac (TORADOL) injection 15 mg 15 mg Intravenous Given         Past Medical/Surgical History:       Diagnosis    Acute Lyme disease    Endometriosis    Endometriosis    Mitral valve regurgitation    MVP (mitral valve prolapse)    PVC (premature ventricular contraction)    SV[facility]supraventricular tachycardia) ([facility])         Admitting Diagnosis: Chest pain [R07 9]     Age/Sex: 35 y o  female     Assessment/Plan:          * Chest pain   Assessment & Plan     · Unclear etiology  · First troponin negative - will trend  · CXR official read pending  · ECG with TWI in Leads III and V1  · ACS rule out  · Given her cardiac history, will appreciate Cardiology consult  · Telemetry  · She is not hypoxic or tachycardic (she did not take her propanolol today)  She denies OCP use  She denies calf pain or tenderness to palpation bilaterally  · Her outpatient Cardiologist was considering Cardiac MRI  · Will check D-dimer      Inappropriate sinus tachycardia   Assessment & Plan     · Follows with Dr Leslee Stiles, Dr Tayler Cobos of [place] [address], and Dr Deisi Collier of [facility] as outpatient  · She is s/p SVT ablation in 2014  · She was intolerant to metoprolol succinate in the past (hypotension, lightheadedness) and could nto take Bystolic (due to cost)   She was recently placed on 10mg daily propanolol as outpatient and has been compliant with this       Impaired left ventricular function   Assessment & Plan     · Echo from November 16 2018 with LVEF 50% (was 45% prior per outpatient Cardiology records)      Moderate mitral valve regurgitation   Assessment & Plan     · Recent Echo revealed mildly myxomatous mitral valve with mild mitral valve prolapse and moderate regurgitation (this is mildly increased when compared to prior 2014 echo)      Vitamin D deficiency   Assessment & Plan     Continue supplementation              Admission Orders:  Observation/Tele  Continuous Cardiac Monitoring  Serial Cardiac Enzymes q3h x 3  Consult Cardiology r/e chest pain, mitral regurgitation  Bilateral Sequential Compression Device  Orthostatic Vital Signs     Scheduled Meds:   Current Facility-Administered Medications:  propranolol 10 mg Oral Daily                       12/2 --  /59 (BP Location: Right arm)   Pulse 97   Temp 99 °F (37 2 °C) (Oral)   Resp 19   Ht 5' 1" (1 549 m)   Wt 56 1 kg (123 lb 9 6 oz)   LMP 11/22/2018 (Approximate)   SpO2 97%   BMI 23 35 kg/m²     Inappropriate sinus tachycardia - Atypical chest pain   Assessment & Plan     - outpatient out-of-network cardiologists are Dr Johana Her (@ [facility]) and Dr Aura Lesches (@ [facility]) per prior documentation  - notable history of ablation in 2014 @ [facility]  - previously intolerant to Metoprolol due to symptomatic hypotension/dizziness - prescribed Bystolic that was too expensive for her - for recent course of Propranolol led to worsening abdominal discomfort/chest tightness and her heart rates at rest now are currently stable off beta-blockade - upon standing up and ambulation however, she complains of reproducible dizziness and lightheadedness coupled with frustration/anxiety  - appreciate general cardiology input who are considering Postural Orthostatic Tachycardia Syndrome (POTS) vs recurrence of inappropriate sinus tachycardia - patient was counseled by cardiology on adequate hydration, liberalizing/increasing salt intake, and increased exercise for stamina  - as aforementioned, dizziness/tachycardia worsens with even simple ambulation - patient was told by outpatient out-of-network cardiologist to come in to the hospital for Electrophysiology evaluation (consult pending)   - orthostatic vital signs negative in regards to BP fluctuation but clinically significant tachycardia evident from lying to sitting to standing   - this morning while brushing teeth she developed episodes of persistent tachycardia caught on telemetry and EKG by nurse - spontaneously back to normal rates at rest   - TSH within normal limits - serum potassium normal - await serum magnesium - CXR negative for acute cardiopulmonary etiology - a stress echocardiogram earlier this year (March 2018) revealed EF of 50-55% with mild-moderate MR but no evidence of ischemia on exercise stress portion per reading cardiologist  - continue telemetry monitoring - patient reassured

## 2018-12-03 NOTE — CONSULTS
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  · Stress echo - 3/2018 - [facility] - LVEF 50%, mild-moderate MR at rest, 6 min 40 sec exercise, 91% MPHR, stopped due to dizziness, negative for ischemia  3  H/o frequent PVCs s/p PVC ablation (2013, Dr Coy at [address] [place] [address])  · Had 18-20% PVC burden in the past and LVEF had dropped to 40-45% in 2013  4  ?H/o endometriosis    Outpatient cardiologist: Taty Courtney  · ECG - sinus tachycardia  · Telemetry - 3 episodes of narrow complex tachycardia this morning in 120-150s - ?sinus tachycardia  · Was on propranolol 10 at presentation, but has been discontinued since yesterday due to reported worsening of symptoms  · She was evaluated by Andrew Stewart on Friday and was felt to have inappropriate sinus tachycardia and possibly POTS, and as a result EP evaluation was sought  · Orthostatic vitals checked yesterday - HR increased from 84 to 127 without any change in BP, suggesting POTS  · HR currently at 90s at rest, and with standing up and walking quickly goes up to 110-130s  · Given her intolerance to BB and borderline BP with recurrent symptomatic episodes, start ivabradine 5 bid to help with symptomatic tachycardia    History of Present Illness   HPI: Nataliya Kitchen is a 35y o  year old female who presents with palpitations  35-year-old female, patient of [facility], comes in with complains of increased frequency of palpitations and dizziness over the past few months    She had a previous history of palpitations in 2013, when she was found to have frequent PVCs  She was placed on metoprolol but had side effects with the same and continued to have frequent PVCs  As a result, she subsequently underwent ablation for PVC in 2013  She was doing well for a couple of years since then  In March 2018, she had recurrence of chest pain and palpitations and presented to [facility], where she was ruled out for ACS and then underwent a stress echo, which was also normal  She was then asked to follow up with her primary cardiologist Brittanie Yee, which she did recently, and underwent 48 hours holter and Echo last month  This showed mostly sinus tachycardia with her palpitations and 1 short 8 beat episode of SVT  She was then placed on propranolol for rate control of this tachyarrhythmia, as she previously had hypotension and light-headedness with metoprolol in 2445 and bystolic was very expensive previously  Over the next week, she continued to have palpitations and dizziness episodes  On the night before presentation, she had another prolonged episode where she had palpitations and dizziness, with intermittent chest squeezing sensation  No associated nausea, vomiting, diarrhoea  She was initially placed in observation to evaluate for arrhythmia or ACS  She was evaluated by Mary Ann Neville on Friday and was felt to have inappropriate sinus tachycardia and possibly POTS, and as a result EP evaluation was sought      Past Medical History:   Diagnosis Date    Acute Lyme disease     Endometriosis     Endometriosis     Mitral valve regurgitation     MVP (mitral valve prolapse)     PVC (premature ventricular contraction)     SVT (supraventricular tachycardia) (HCC)      Past Surgical History:   Procedure Laterality Date    ABLATION OF DYSRHYTHMIC FOCUS      DIAGNOSTIC LAPAROSCOPY      for endometriosis    LAPAROSCOPY FOR ECTOPIC PREGNANCY       History reviewed  No pertinent family history    Meds/Allergies   all current active meds have been reviewed and current meds: Current Facility-Administered Medications   Medication Dose Route Frequency    acetaminophen (TYLENOL) tablet 650 mg  650 mg Oral Q6H PRN    ondansetron (ZOFRAN) injection 4 mg  4 mg Intravenous Q6H PRN    traMADol (ULTRAM) tablet 50 mg  50 mg Oral Q6H PRN     Prescriptions Prior to Admission   Medication    propranolol (INDERAL) 10 mg tablet     No Known Allergies  Social History     Social History    Marital status: /Civil Union     Spouse name: N/A    Number of children: N/A    Years of education: N/A     Social History Main Topics    Smoking status: Former Smoker     Quit date: 8/30/2013    Smokeless tobacco: Never Used    Alcohol use No    Drug use: No    Sexual activity: Yes     Partners: Male     Other Topics Concern    None     Social History Narrative    None         Review of Systems  c/o chest pain, c/o palpitations, No c/o shortness of breath, c/o dizziness or light-headedness, No c/o abdominal pain, no c/o nausea/vomitting  All other systems negative    Vitals:    12/02/18 2346 12/03/18 0603 12/03/18 0800 12/03/18 0900   BP: 129/56 115/77 109/62    BP Location: Right arm Right arm Right arm    Pulse: 96 94 81    Resp: 20 20 18    Temp: 98 1 °F (36 7 °C) 97 6 °F (36 4 °C) 98 5 °F (36 9 °C)    TempSrc: Oral Oral Oral    SpO2: 98% 98% 98% 98%   Weight:       Height:         Orthostatic Blood Pressures      Most Recent Value   Blood Pressure  109/62 filed at 12/03/2018 0800   Patient Position - Orthostatic VS  Lying filed at 12/03/2018 0800        Body mass index is 23 35 kg/m²  Wt Readings from Last 5 Encounters:   11/30/18 56 1 kg (123 lb 9 6 oz)   04/20/17 54 5 kg (120 lb 4 oz)   08/30/16 70 8 kg (156 lb)   08/29/16 70 3 kg (155 lb)   08/26/16 69 4 kg (153 lb)     I/O last 3 completed shifts: In: 360 [P O :360]  Out: -       Physical Exam    Constitutional:  Shira Zamudio appears well, alert and oriented x 3, pleasant and cooperative   No acute distress HEENT:    Normocephalic, atraumatic   Neck:  Supple, No JVD   Lungs:  Clear to auscultation bilaterally, respirations unlabored    Chest Wall:  No tenderness or deformity    Heart::  Regular rhythm, Tachycardia+ S1 and S2 normal, 3/6 holosystolic murmur+, No rub or gallop    Abdomen:  Soft, non-tender, non-distended   Neurological:  Awake, alert, oriented x3  Non-focal exam    Extremities:  No pedal edema, No calf tenderness         Labs:    Results from last 7 days  Lab Units 11/30/18  0410 11/29/18  1301   WBC Thousand/uL 7 58 8 16   HEMOGLOBIN g/dL 12 1 12 9   HEMATOCRIT % 35 6 39 6   RDW % 12 3 12 3   PLATELETS Thousands/uL 195 206       Results from last 7 days  Lab Units 11/30/18  0410 11/29/18  1301   POTASSIUM mmol/L 3 8 4 1   CHLORIDE mmol/L 106 105   CO2 mmol/L 26 26   BUN mg/dL 13 7   CREATININE mg/dL 0 73 0 73   CALCIUM mg/dL 9 0 9 4   AST U/L  --  11   ALT U/L  --  20   ALK PHOS U/L  --  55       Results from last 7 days  Lab Units 11/30/18  0410 11/30/18  0249 11/30/18  0008 11/29/18  1301   TROPONIN I ng/mL <0 02 <0 02 <0 02 <0 02                   EKG: NSR  Telemetry: Narrow-complex tacyhcardia - at least 3 episodes 120-150s - ? A tach  Imaging: X-ray Chest 2 Views    Result Date: 11/29/2018  Narrative: CHEST INDICATION:   chest pain  COMPARISON:  None EXAM PERFORMED/VIEWS:  XR CHEST PA & LATERAL Images: 2 FINDINGS:  Lungs adequately aerated  Cardiomediastinal silhouette appears unremarkable  The lungs are clear  No pneumothorax or pleural effusion  Osseous structures appear within normal limits for patient age  Impression: No acute cardiopulmonary disease  Workstation performed: [id number]       Cardiac testing:   No results found for this or any previous visit  No results found for this or any previous visit  No results found for this or any previous visit  No results found for this or any previous visit      Counseling / Coordination of Care  Total floor / unit time spent today 50 minutes  Greater than 50% of total time was spent with the patient and / or family counseling and / or coordination of care  A description of the counseling / coordination of care: Obtained history, performed physical examination, discussed laboratory and imaging results, and explained further plan of care  Danette Anthony

## 2018-12-03 NOTE — PROGRESS NOTES
[address] Hospitalist Service - Internal Medicine Progress Note       PATIENT INFORMATION      Patient: Liza Harper 35 y o  female   MRN: [id number]  PCP: Chey Francisco MD  Unit/Bed#: CW2 217-03 Encounter: [id number]  Date Of Visit: 12/03/18       ASSESSMENTS & PLAN     Inappropriate sinus tachycardia - Atypical chest pain   Assessment & Plan    - outpatient out-of-network cardiologists are Dr Henry Puentes (@ [facility]) and Dr Vishal Blanca (@ [facility]) per prior documentation  - notable history of ablation in 2014 @ [facility]  - previously intolerant to Metoprolol due to symptomatic hypotension/dizziness - prescribed Bystolic that was too expensive for her - for recent course of Propranolol led to worsening abdominal discomfort/chest tightness and her heart rates at rest now are currently stable off beta-blockade - upon standing up and ambulation however, she still intermittently complains of reproducible dizziness and lightheadedness coupled with frustration/anxiety  - appreciate general cardiology input who are considering Postural Orthostatic Tachycardia Syndrome (POTS) vs recurrence of inappropriate sinus tachycardia - patient was counseled by cardiology on adequate hydration, liberalizing/increasing salt intake, and increased exercise for stamina  - as aforementioned, dizziness/tachycardia worsens with even simple ambulation (especially in the morning over last few days) - patient was told by outpatient out-of-network cardiologist to come in to the hospital for electrophysiology evaluation (awaiting consult) - can possibly consider repeat ablation or anti-arrhythmic therapy? ?   - orthostatic vital signs negative in regards to BP fluctuation but clinically significant tachycardia evident from lying to sitting to standing on 12/2  - yesterday morning while brushing teeth she developed episodes of persistent tachycardia caught on telemetry and EKG by nurse - spontaneously back to normal rates at rest - similar episode this morning as well (not caught on EKG unfortunately however per patient)  - TSH within normal limits - serum potassium normal - check serum magnesium as well - CXR negative for acute cardiopulmonary etiology - a stress echocardiogram earlier this year (2018) revealed EF of 50-55% with mild-moderate MR but no evidence of ischemia on exercise stress portion per reading cardiologist - test was prematurely stopped however due to dizziness at the time as well  - continue telemetry monitoring - patient reassured      History of ectopic pregnancy   Assessment & Plan    - pregnancy test this admission negative       VTE Prophylaxis:  Ambulatory      SUBJECTIVE     Seen/examined earlier today  Patient notes that similar to yesterday, upon getting up this morning and attempting to use the bathroom/pressure teeth, she felt increased palpitations again with some dizziness but less severe than yesterday  Upon returning to rest, she noted that the symptoms subsided eventually  Currently denying chest pain, shortness breath, or palpitations upon my encounter as she is still at rest   Awaiting electrophysiology evaluation  OBJECTIVE     Vitals:   Temp (24hrs), Av °F (36 7 °C), Min:97 5 °F (36 4 °C), Max:98 5 °F (36 9 °C)    Temp:  [97 5 °F (36 4 °C)-98 5 °F (36 9 °C)] 98 5 °F (36 9 °C)  HR:  [81-96] 92  Resp:  [18-20] 18  BP: (109-129)/(56-77) 119/70  SpO2:  [97 %-99 %] 99 %  Body mass index is 23 35 kg/m²  Input and Output Summary (last 24 hours):        Intake/Output Summary (Last 24 hours) at 18 1644  Last data filed at 18 1249   Gross per 24 hour   Intake              540 ml   Output                0 ml   Net              540 ml       Physical Exam:     GENERAL:  Well-developed/nourished - no immediate distress  HEAD:  Normocephalic - atraumatic  EYES: PERRL - EOMI   MOUTH:  Mucosa moist  NECK:  Supple - full range of motion  CARDIAC:  Regular rate/rhythm - S1/S2 positive  PULMONARY:  Clear breath sounds bilaterally - nonlabored respirations  ABDOMEN:  Soft - nontender/nondistended - active bowel sounds  MUSCULOSKELETAL:  Motor strength/range of motion intact  NEUROLOGIC:  Alert/oriented x 3   SKIN:  Age-appropriate wrinkles/blemishes   PSYCHIATRIC:  Mood/affect less anxious today      ADDITIONAL DATA       Labs & Recent Cultures:       Results from last 7 days  Lab Units 11/30/18 0410 11/29/18  1301   WBC Thousand/uL 7 58 8 16   HEMOGLOBIN g/dL 12 1 12 9   HEMATOCRIT % 35 6 39 6   PLATELETS Thousands/uL 195 206   NEUTROS PCT %  --  61   LYMPHS PCT %  --  32   MONOS PCT %  --  6   EOS PCT %  --  1       Results from last 7 days  Lab Units 11/30/18 0410 11/29/18  1301   POTASSIUM mmol/L 3 8 4 1   CHLORIDE mmol/L 106 105   CO2 mmol/L 26 26   BUN mg/dL 13 7   CREATININE mg/dL 0 73 0 73   CALCIUM mg/dL 9 0 9 4   ALK PHOS U/L  --  55   ALT U/L  --  20   AST U/L  --  11         Last 24 Hours Medication List:     Current Facility-Administered Medications:  acetaminophen 650 mg Oral Q6H PRN Hallie Marin PA-C   NON FORMULARY 5 mg Oral Q12H Asher Paulino MD   ondansetron 4 mg Intravenous Q6H PRN Hallie Marin PA-C   traMADol 50 mg Oral Q6H PRN Hallie Marin PA-C          Time Spent for Care: 32 minutes  More than 50% of total time spent on counseling and coordination of care as described above  Current Length of Stay: 1 day(s)      Code Status: Level 1 - Full Code         ** Please Note: This note is constructed using a voice recognition dictation system   **

## 2018-12-03 NOTE — PHYSICIAN ADVISOR
Current patient class: Observation  The patient is currently on Hospital Day: 4 at [address] [place] [address]      The patient was admitted to the hospital at N/A on N/A for the following diagnosis:  Mitral valve prolapse [I34 1]  Mitral regurgitation [I34 0]  Chest pain [R07 9]  Dyspnea on exertion [R06 09]  Inappropriate sinus tachycardia [R00 0]  Impaired left ventricular function [I51 9]       There is documentation in the medical record of an expected length of stay of at least 2 midnights  The patient is therefore expected to satisfy the 2 midnight benchmark and given the 2 midnight presumption is appropriate for INPATIENT ADMISSION  Given this expectation of a satisfying stay, CMS instructs us that the patient is most often appropriate for inpatient admission under part A provided medical necessity is documented in the chart  After review of the relevant documentation, labs, vital signs and test results, the patient is appropriate for INPATIENT ADMISSION  Admission to the hospital as an inpatient is a complex decision making process which requires the practitioner to consider the patients presenting complaint, history and physical examination and all relevant testing  With this in mind, in this case, the patient was deemed appropriate for INPATIENT ADMISSION  After review of the documentation and testing available at the time of the admission I concur with this clinical determination of medical necessity  Rationale is as follows: The patient is a 35 yrs old Female who presented to the ED at 11/29/2018  4:15 PM with a chief complaint of Chest Pain (patient reports chest squeezing since 0200 this morning, SOB on exertion)     Given the need for further hospitalization, and along with the documentation of medical necessity present in the chart, the patient is appropriate for inpatient admission    The patient is expected to satisfy the 2 midnight benchmark, and will require further acute medical care  The patient does have comorbid conditions which increases the risk for significant adverse outcome  Given this the patient is appropriate for inpatient admission  The patients vitals on arrival were ED Triage Vitals   Temperature Pulse Respirations Blood Pressure SpO2   11/29/18 1252 11/29/18 1252 11/29/18 1252 11/29/18 1252 11/29/18 1252   98 1 °F (36 7 °C) 90 18 114/56 98 %      Temp Source Heart Rate Source Patient Position - Orthostatic VS BP Location FiO2 (%)   11/29/18 1252 11/29/18 1252 11/29/18 1252 11/29/18 1252 --   Oral Monitor Lying Right arm       Pain Score       11/29/18 1251       5           Past Medical History:   Diagnosis Date    Acute Lyme disease     Endometriosis     Endometriosis     Mitral valve regurgitation     MVP (mitral valve prolapse)     PVC (premature ventricular contraction)     SVT (supraventricular tachycardia) (HCC)      Past Surgical History:   Procedure Laterality Date    ABLATION OF DYSRHYTHMIC FOCUS      DIAGNOSTIC LAPAROSCOPY      for endometriosis    LAPAROSCOPY FOR ECTOPIC PREGNANCY             Consults have been placed to:   IP CONSULT TO CARDIOLOGY  IP CONSULT TO ELECTROPHYSIOLOGY    Vitals:    12/02/18 1300 12/02/18 1301 12/02/18 1303 12/02/18 1527   BP: 105/64 105/66 108/60 116/59   BP Location: Left arm Left arm Left arm Right arm   Pulse: 84 102 (!) 127 97   Resp:    19   Temp:    99 °F (37 2 °C)   TempSrc:    Oral   SpO2:    97%   Weight:       Height:           Most recent labs:    Recent Labs      11/30/18   0410   WBC  7 58   HGB  12 1   HCT  35 6   PLT  195   K  3 8   CALCIUM  9 0   BUN  13   CREATININE  0 73   TROPONINI  <0 02       Scheduled Meds:  Current Facility-Administered Medications:  acetaminophen 650 mg Oral Q6H PRN Rod Quivers, PA-C   ondansetron 4 mg Intravenous Q6H PRN Rod Quivers, PA-C   traMADol 50 mg Oral Q6H PRN Rod Quivers, PA-C     Continuous Infusions:   PRN Meds:   acetaminophen    ondansetron    traMADol    Surgical procedures (if appropriate):

## 2018-12-04 DIAGNOSIS — R00.0 INAPPROPRIATE SINUS TACHYCARDIA: Primary | ICD-10-CM

## 2018-12-04 DIAGNOSIS — I49.8 POTS (POSTURAL ORTHOSTATIC TACHYCARDIA SYNDROME): ICD-10-CM

## 2018-12-04 PROBLEM — G43.909 MIGRAINES: Status: ACTIVE | Noted: 2018-12-04

## 2018-12-04 PROCEDURE — 99232 SBSQ HOSP IP/OBS MODERATE 35: CPT | Performed by: PHYSICIAN ASSISTANT

## 2018-12-04 RX ORDER — MIDODRINE HYDROCHLORIDE 5 MG/1
2.5 TABLET ORAL
Status: DISCONTINUED | OUTPATIENT
Start: 2018-12-04 | End: 2018-12-04

## 2018-12-04 RX ORDER — MIDODRINE HYDROCHLORIDE 5 MG/1
2.5 TABLET ORAL 3 TIMES DAILY
Status: DISCONTINUED | OUTPATIENT
Start: 2018-12-04 | End: 2018-12-05

## 2018-12-04 RX ORDER — PROPRANOLOL HYDROCHLORIDE 20 MG/1
20 TABLET ORAL EVERY 8 HOURS SCHEDULED
Status: DISCONTINUED | OUTPATIENT
Start: 2018-12-04 | End: 2018-12-04

## 2018-12-04 RX ORDER — PROPRANOLOL HYDROCHLORIDE 20 MG/1
20 TABLET ORAL EVERY 8 HOURS SCHEDULED
Status: DISCONTINUED | OUTPATIENT
Start: 2018-12-04 | End: 2018-12-05

## 2018-12-04 RX ADMIN — MIDODRINE HYDROCHLORIDE 2.5 MG: 5 TABLET ORAL at 17:35

## 2018-12-04 RX ADMIN — TRAMADOL HYDROCHLORIDE 50 MG: 50 TABLET, COATED ORAL at 08:03

## 2018-12-04 RX ADMIN — PROPRANOLOL HYDROCHLORIDE 20 MG: 20 TABLET ORAL at 17:35

## 2018-12-04 NOTE — UTILIZATION REVIEW
Continued Stay Review    Date: 12/4    Vital Signs: /57 (BP Location: Left arm)   Pulse 93   Temp 99 °F (37 2 °C) (Oral)   Resp 18   Ht 5' 1" (1 549 m)   Wt 56 1 kg (123 lb 9 6 oz)   LMP 11/22/2018 (Approximate)   SpO2 100%   BMI 23 35 kg/m²     Medications:   Scheduled Meds:   Current Facility-Administered Medications:  midodrine 2 5 mg Oral TID AC   NON FORMULARY 5 mg Oral Q12H   propranolol 20 mg Oral Q8H [address]     Continuous Infusions:    PRN Meds:   acetaminophen    ondansetron    traMADol    Abnormal Labs/Diagnostic Results:   No new    Age/Sex: 35 y o  female     Assessment/Plan:   12/4 Progress notes:  Inappropriate sinus tachycardia - Atypical chest pain/ Migraines  notable history of ablation in 2014 @ [facility]  intolerant to beta blockers  EP recommending starting new medication- ivabradine  Our pharmacy does not carry this   Discussed with pharmacist today- medication approved but may take 24-48 hours to obtain  EP prefers to keep patient inpatient until symptoms better controlled  The patient will continue to require additional inpatient hospital stay due to cardiac monitoring    Discharge Plan: TBD

## 2018-12-04 NOTE — ASSESSMENT & PLAN NOTE
· Patient gets these at home  · Currently pain is better but if recurring, could trial migraine cocktail

## 2018-12-04 NOTE — UTILIZATION REVIEW
Lionel Cornea, RN Registered Nurse Signed   Case Management Date of Service: 12/2/2018  8:28 PM         []Hide copied text  Marilou Colvin RN Registered Nurse Signed     Utilization Review Date of Service: 11/30/2018  8:10 AM            Initial Clinical Review     Admission: Date/Time/Statement: 11/29/2018 @ 18:15   Observation -- CONVERTED TO INPATIENT 12/02 @ [address] [facility] EP EVALUATION     12/02/18 1919  Inpatient Admission Once     Transfer Service: General Medicine       Question Answer Comment   Admitting Physician Yasmine Brizuela     Level of Care Med Surg     Estimated length of stay More than 2 Midnights     Certification I certify that inpatient services are medically necessary for this patient for a duration of greater than two midnights  See H&P and MD Progress Notes for additional information about the patient's course of treatment          12/02/18 1919         ED: Date/Time/Mode of Arrival:                                  ED Arrival Information      Expected Arrival Acuity Means of Arrival Escorted By Service Admission Type     11/29/2018 11/29/2018 12:29 Emergent Walk-In Self Emergency Medicine Emergency     Arrival Complaint     chest squeezing              Chief Complaint:           Chief Complaint   Patient presents with    Chest Pain       patient reports chest squeezing since 0200 this morning, SOB on exertion         History of Illness: 35 y  o  female presents with chest pain   She describes her pain as "squeeze" and "pressure" which begins in her sternum and radiates to under her left breast   These episodes began last night, and she had about 5-6 in 10 minutes      ED Vital Signs:                             ED Triage Vitals   Temperature Pulse Respirations Blood Pressure SpO2   11/29/18 1252 11/29/18 1252 11/29/18 1252 11/29/18 1252 11/29/18 1252   98 1 °F (36 7 °C) 90 18 114/56 98 %       Temp Source Heart Rate Source Patient Position - Orthostatic VS BP Location FiO2 (%)   11/29/18 1252 11/29/18 1252 11/29/18 1252 11/29/18 1252 --   Oral Monitor Lying Right arm         Pain Score           11/29/18 1251           5                  Wt Readings from Last 1 Encounters:   04/20/17 54 5 kg (120 lb 4 oz)         Vital Signs (abnormal): HR 64 - 122, RR 16 - 27     Abnormal Labs/Diagnostic Test Results: Na 135     EKG: Normal sinus rhythm, Nonspecific T wave abnormality     ED Treatment:                                        Date/Time Order Dose Route Action        11/29/2018 1738 acetaminophen (TYLENOL) tablet 975 mg 975 mg Oral Given        11/30/2018 0202 ketorolac (TORADOL) injection 15 mg 15 mg Intravenous Given          Past Medical/Surgical History:         Diagnosis    Acute Lyme disease    Endometriosis    Endometriosis    Mitral valve regurgitation    MVP (mitral valve prolapse)    PVC (premature ventricular contraction)    SVT (supraventricular tachycardia) ([facility])         Admitting Diagnosis: Chest pain [R07 9]     Age/Sex: 35 y  o  female     Assessment/Plan:            * Chest pain   Assessment & Plan     · Unclear etiology  · First troponin negative - will trend  · CXR official read pending  · ECG with TWI in Leads III and V1  · ACS rule out  · Given her cardiac history, will appreciate Cardiology consult  · Telemetry  · She is not hypoxic or tachycardic (she did not take her propanolol today)  She denies OCP use  She denies calf pain or tenderness to palpation bilaterally  · Her outpatient Cardiologist was considering Cardiac MRI  · Will check D-dimer      Inappropriate sinus tachycardia   Assessment & Plan     · Follows with Dr Kamaljit Hair, Dr Allison Li of [place] [address], and Dr Regan Foley of [facility] as outpatient  · She is s/p SVT ablation in 2014  · She was intolerant to metoprolol succinate in the past (hypotension, lightheadedness) and could nto take Bystolic (due to cost)   She was recently placed on 10mg daily propanolol as outpatient and has been compliant with this       Impaired left ventricular function   Assessment & Plan     · Echo from November 16 2018 with LVEF 50% (was 45% prior per outpatient Cardiology records)      Moderate mitral valve regurgitation   Assessment & Plan     · Recent Echo revealed mildly myxomatous mitral valve with mild mitral valve prolapse and moderate regurgitation (this is mildly increased when compared to prior 2014 echo)      Vitamin D deficiency   Assessment & Plan     Continue supplementation              Admission Orders:  Observation/Tele  Continuous Cardiac Monitoring  Serial Cardiac Enzymes q3h x 3  Consult Cardiology r/e chest pain, mitral regurgitation  Bilateral Sequential Compression Device  Orthostatic Vital Signs     Scheduled Meds:   Current Facility-Administered Medications:  propranolol 10 mg Oral Daily                         12/2 --  /59 (BP Location: Right arm)   Pulse 97   Temp 99 °F (37 2 °C) (Oral)   Resp 19   Ht 5' 1" (1 549 m)   Wt 56 1 kg (123 lb 9 6 oz)   LMP 11/22/2018 (Approximate)   SpO2 97%   BMI 23 35 kg/m²          Inappropriate sinus tachycardia - Atypical chest pain   Assessment & Plan     - outpatient out-of-network cardiologists are Dr Cullen Pedersen (@ [facility]) and Dr Radames Li (@ [facility]) per prior documentation  - notable history of ablation in 2014 @ [facility]  - previously intolerant to Metoprolol due to symptomatic hypotension/dizziness - prescribed Bystolic that was too expensive for her - for recent course of Propranolol led to worsening abdominal discomfort/chest tightness and her heart rates at rest now are currently stable off beta-blockade - upon standing up and ambulation however, she complains of reproducible dizziness and lightheadedness coupled with frustration/anxiety  - appreciate general cardiology input who are considering Postural Orthostatic Tachycardia Syndrome (POTS) vs recurrence of inappropriate sinus tachycardia - patient was counseled by cardiology on adequate hydration, liberalizing/increasing salt intake, and increased exercise for stamina  - as aforementioned, dizziness/tachycardia worsens with even simple ambulation - patient was told by outpatient out-of-network cardiologist to come in to the hospital for Electrophysiology evaluation (consult pending)   - orthostatic vital signs negative in regards to BP fluctuation but clinically significant tachycardia evident from lying to sitting to standing   - this morning while brushing teeth she developed episodes of persistent tachycardia caught on telemetry and EKG by nurse - spontaneously back to normal rates at rest   - TSH within normal limits - serum potassium normal - await serum magnesium - CXR negative for acute cardiopulmonary etiology - a stress echocardiogram earlier this year (March 2018) revealed EF of 50-55% with mild-moderate MR but no evidence of ischemia on exercise stress portion per reading cardiologist  - continue telemetry monitoring - patient reassured

## 2018-12-04 NOTE — SOCIAL WORK
Cm spoke with ARIANA Linn) regarding patient's new start medication (Ivabradine)  PA is coordinating this with [employer]  Per PA, "medication has been approved and will take 24-48 hours to obtain it  EP wants to keep her here until we try medication and symptoms improve "    Cm to continue following

## 2018-12-04 NOTE — PROGRESS NOTES
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  · Stress echo - 3/2018 - [facility] - LVEF 50%, mild-moderate MR at rest, 6 min 40 sec exercise, 91% MPHR, stopped due to dizziness, negative for ischemia  3  H/o frequent PVCs s/p PVC ablation (2013, Dr Coy at [address] [place] [address])  · Had 18-20% PVC burden in the past and LVEF had dropped to 40-45% in 2013  4  ?H/o endometriosis     Outpatient cardiologist: Lazaro Drummond       Plan  · Continues to have more episodes of palpitations and dizziness, particularly with standing up and walking, with HR going up to 150s with minimal standing/walking  · BP remains on lower side /50-65, even without any medications  · Has previously been unable to tolerate metoprolol due to hypotension and dizziness and had dizziness again with low dose propranolol over the past few days  · As a result, it is unlikely that she is going to be able to tolerate higher doses of beta-blockers or calcium-channel blockers that she would need to help with her IST  · She would benefit from Ivabradine as a result, which would not lower her BP  · It is non-formulary at [facility] and pharmacy is currently working on trying to acquire the same for her  · Continue monitoring for now    Subjective:   Continues to have episodes of palpitations and dizziness  Has a headache this morning    Review of Systems  No c/o chest pain, c/o palpitations, No c/o shortness of breath, c/o dizziness or light-headedness, No c/o abdominal pain, no c/o nausea/vomitting  All other systems negative    Telemetry Review: Sinus tachycardia with HR 150s yesterday evening    Objective:   Vitals: Blood pressure 112/57, pulse 82, temperature 99 °F (37 2 °C), temperature source Oral, resp   rate 18, height 5' 1" (1 549 m), weight 56 1 kg (123 lb 9 6 oz), last menstrual period 11/22/2018, SpO2 97 %, currently breastfeeding , Body mass index is 23 35 kg/m² , Orthostatic Blood Pressures      Most Recent Value   Blood Pressure  112/57 filed at 12/04/2018 0810   Patient Position - Orthostatic VS  Lying filed at 12/04/2018 2697         Systolic (00JRC), CCD:324 , Min:97 , YXT:630     Diastolic (90MHL), DED:11, Min:56, Max:70    Wt Readings from Last 5 Encounters:   11/30/18 56 1 kg (123 lb 9 6 oz)   04/20/17 54 5 kg (120 lb 4 oz)   08/30/16 70 8 kg (156 lb)   08/29/16 70 3 kg (155 lb)   08/26/16 69 4 kg (153 lb)     I/O       12/02 0701 - 12/03 0700 12/03 0701 - 12/04 0700 12/04 0701 - 12/05 0700    P  O  360 180     Total Intake(mL/kg) 360 (6 4) 180 (3 2)     Net +360 +180                       Physical Exam    Constitutional:  Fuentes Figueroa appears well, alert and oriented x 3, pleasant and cooperative  No acute distress   HEENT:    Normocephalic, atraumatic   Neck:  Supple, No JVD   Lungs:  Clear to auscultation bilaterally, respirations unlabored    Chest Wall:  No tenderness or deformity    Heart::  Regular rate, Regular rhythm, S1 and S2 normal, 2/6 systolic murmur+, No rub or gallop    Abdomen:  Soft, non-tender, non-distended   Neurological:  Awake, alert, oriented x3   Non-focal exam    Extremities:  No pedal edema, No calf tenderness         Laboratory Results:    Results from last 7 days  Lab Units 11/30/18  0410 11/30/18  0249 11/30/18  0008   TROPONIN I ng/mL <0 02 <0 02 <0 02       CBC with diff:     Results from last 7 days  Lab Units 11/30/18  0410 11/29/18  1301   WBC Thousand/uL 7 58 8 16   HEMOGLOBIN g/dL 12 1 12 9   HEMATOCRIT % 35 6 39 6   MCV fL 89 90   PLATELETS Thousands/uL 195 206   MCH pg 30 3 29 3   MCHC g/dL 34 0 32 6   RDW % 12 3 12 3   MPV fL 11 2 10 9   NRBC AUTO /100 WBCs  --  0         CMP:  Results from last 7 days  Lab Units 11/30/18  0410 11/29/18  1301   POTASSIUM mmol/L 3 8 4 1   CHLORIDE mmol/L 106 105   CO2 mmol/L 26 26   BUN mg/dL 13 7   CREATININE mg/dL 0 73 0 73   CALCIUM mg/dL 9 0 9 4   AST U/L  --  11   ALT U/L  --  20   ALK PHOS U/L  --  55   EGFR ml/min/1 73sq m 109 109         BMP:  Results from last 7 days  Lab Units 11/30/18  0410 11/29/18  1301   POTASSIUM mmol/L 3 8 4 1   CHLORIDE mmol/L 106 105   CO2 mmol/L 26 26   BUN mg/dL 13 7   CREATININE mg/dL 0 73 0 73   CALCIUM mg/dL 9 0 9 4       BNP: No results for input(s): BNP in the last 72 hours  Magnesium:   Results from last 7 days  Lab Units 12/03/18  1709   MAGNESIUM mg/dL 2 1       Coags:       TSH:        Hemoglobin A1C       Lipid Profile:       Meds/Allergies   all current active meds have been reviewed and current meds: Current Facility-Administered Medications   Medication Dose Route Frequency    acetaminophen (TYLENOL) tablet 650 mg  650 mg Oral Q6H PRN    NON FORMULARY  5 mg Oral Q12H    ondansetron (ZOFRAN) injection 4 mg  4 mg Intravenous Q6H PRN    traMADol (ULTRAM) tablet 50 mg  50 mg Oral Q6H PRN     Prescriptions Prior to Admission   Medication    propranolol (INDERAL) 10 mg tablet            Cardiac testing:   No results found for this or any previous visit  No results found for this or any previous visit  No results found for this or any previous visit  No results found for this or any previous visit  Counseling / Coordination of Care  Total floor / unit time spent today 25 minutes  Greater than 50% of total time was spent with the patient and / or family counseling and / or coordination of care  A description of the counseling / coordination of care: Obtained history, performed physical examination, discussed laboratory and imaging results, and explained further plan of care  [place] [facility]

## 2018-12-04 NOTE — PROGRESS NOTES
Progress Note - Hermilo Ema 1985, 35 y o  female MRN: [id number]    Unit/Bed#: [place] 217-03 Encounter: [id number]    Primary Care Provider: Angelina Crowell MD   Date and time admitted to hospital: 11/29/2018  4:15 PM    * Inappropriate sinus tachycardia - Atypical chest pain   Assessment & Plan    · POTS/IST  · outpatient out-of-network cardiologists are Dr Viry Gann (@ [place] [address] ) and Dr Gertrude Herrera (@ [facility]) per prior documentation  · notable history of ablation in 2014 @ [facility]  · intolerant to beta blockers  · EP recommending starting new medication- ivabradine  Our pharmacy does not carry this  Discussed with pharmacist today- medication approved but may take 24-48 hours to obtain  · EP prefers to keep patient inpatient until symptoms better controlled  · Patient has an upcoming flight Thursday which may need to be cancelled- will provide medical note for cancellation     Migraines   Assessment & Plan    · Patient gets these at home  · Currently pain is better but if recurring, could trial migraine cocktail     History of ectopic pregnancy   Assessment & Plan    · pregnancy test this admission negative       Moderate mitral valve regurgitation   Assessment & Plan    · Recent Echo revealed mildly myxomatous mitral valve with mild mitral valve prolapse and moderate regurgitation (this is mildly increased when compared to prior 2014 echo)     Impaired left ventricular function   Assessment & Plan    · Echo from November 16 2018 with LVEF 50% (was 45% prior per outpatient Cardiology records)       VTE Pharmacologic Prophylaxis:   Pharmacologic: None- low risk  Mechanical VTE Prophylaxis in Place: Yes    Patient Centered Rounds: I have performed bedside rounds with nursing staff today  Discussions with Specialists or Other Care Team Provider: Discussed with pharmacy    Education and Discussions with Family / Patient: Patient    Time Spent for Care: 30 minutes    More than 50% of total time spent on counseling and coordination of care as described above  Current Length of Stay: 2 day(s)    Current Patient Status: Inpatient   Certification Statement: The patient will continue to require additional inpatient hospital stay due to cardiac monitoring    Discharge Plan: Awaiting medication first    Code Status: Level 1 - Full Code      Subjective:   No cardiac symptoms at rest but continues to get them with activity  Also reports migraine- headache in left side of head- a lot of pressure  Reports some visual changes in the right eye  She is sensitive to light  She states she carries a diagnosis of migraines and gets these at home and these symptoms are not atypical for her  She states headache was bothering her before but after receiving medications this morning, it is now 2/10  Objective:     Vitals:   Temp (24hrs), Av 2 °F (36 8 °C), Min:97 5 °F (36 4 °C), Max:99 °F (37 2 °C)    Temp:  [97 5 °F (36 4 °C)-99 °F (37 2 °C)] 99 °F (37 2 °C)  HR:  [] 82  Resp:  [18-20] 18  BP: ()/(56-70) 112/57  SpO2:  [97 %-99 %] 97 %  Body mass index is 23 35 kg/m²  Input and Output Summary (last 24 hours): Intake/Output Summary (Last 24 hours) at 18 1216  Last data filed at 18 1249   Gross per 24 hour   Intake              180 ml   Output                0 ml   Net              180 ml       Physical Exam:     Physical Exam   Constitutional: She is oriented to person, place, and time  No distress  HENT:   Head: Normocephalic and atraumatic  Eyes: No scleral icterus  Neck: Neck supple  Cardiovascular: Normal rate, regular rhythm and normal heart sounds  Pulmonary/Chest: Effort normal and breath sounds normal  No respiratory distress  She has no wheezes  She has no rales  Musculoskeletal: Normal range of motion  Neurological: She is alert and oriented to person, place, and time  Skin: Skin is warm and dry  She is not diaphoretic     Psychiatric: She has a normal mood and affect  Her behavior is normal        Additional Data:     Labs:      Results from last 7 days  Lab Units 11/30/18  0410 11/29/18  1301   WBC Thousand/uL 7 58 8 16   HEMOGLOBIN g/dL 12 1 12 9   HEMATOCRIT % 35 6 39 6   PLATELETS Thousands/uL 195 206   NEUTROS PCT %  --  61   LYMPHS PCT %  --  32   MONOS PCT %  --  6   EOS PCT %  --  1       Results from last 7 days  Lab Units 11/30/18  0410 11/29/18  1301   POTASSIUM mmol/L 3 8 4 1   CHLORIDE mmol/L 106 105   CO2 mmol/L 26 26   BUN mg/dL 13 7   CREATININE mg/dL 0 73 0 73   CALCIUM mg/dL 9 0 9 4   ALK PHOS U/L  --  55   ALT U/L  --  20   AST U/L  --  11           * I Have Reviewed All Lab Data Listed Above  * Additional Pertinent Lab Tests Reviewed: All Labs Within Last 24 Hours Reviewed    Imaging:    Imaging Reports Reviewed Today Include: None  Imaging Personally Reviewed by Myself Includes:  None    Recent Cultures (last 7 days):           Last 24 Hours Medication List:     Current Facility-Administered Medications:  acetaminophen 650 mg Oral Q6H PRN Sallie Umanzor PA-C   midodrine 2 5 mg Oral TID AC Ezra Bains MD   NON FORMULARY 5 mg Oral Q12H Balaji Erazo MD   ondansetron 4 mg Intravenous Q6H PRN Sallie Umanzor PA-C   propranolol 20 mg Oral Q8H [address] Balaji Erazo MD   traMADol 50 mg Oral Q6H PRN Sallie Umanzor PA-C        Today, Patient Was Seen By: Mason Turner PA-C    ** Please Note: Dragon 360 Dictation voice to text software may have been used in the creation of this document   **

## 2018-12-05 PROCEDURE — 99232 SBSQ HOSP IP/OBS MODERATE 35: CPT | Performed by: PHYSICIAN ASSISTANT

## 2018-12-05 RX ADMIN — TRAMADOL HYDROCHLORIDE 50 MG: 50 TABLET, COATED ORAL at 21:50

## 2018-12-05 RX ADMIN — IVABRADINE 5 MG: 5 TABLET, FILM COATED ORAL at 22:29

## 2018-12-05 RX ADMIN — IVABRADINE 5 MG: 5 TABLET, FILM COATED ORAL at 10:40

## 2018-12-05 RX ADMIN — PROPRANOLOL HYDROCHLORIDE 20 MG: 20 TABLET ORAL at 00:13

## 2018-12-05 RX ADMIN — MIDODRINE HYDROCHLORIDE 2.5 MG: 5 TABLET ORAL at 00:04

## 2018-12-05 RX ADMIN — MIDODRINE HYDROCHLORIDE 2.5 MG: 5 TABLET ORAL at 06:44

## 2018-12-05 RX ADMIN — PROPRANOLOL HYDROCHLORIDE 20 MG: 20 TABLET ORAL at 06:44

## 2018-12-05 NOTE — PROGRESS NOTES
Progress Note - Abbey Boo 1985, 35 y o  female MRN: [id number]  Unit/Bed#: [facility] 217-03 Encounter: [id number] DOS: 12/5/18  Primary Care Provider: Paulo Romo MD   Date and time admitted to hospital: 11/29/2018  4:15 PM    * Inappropriate sinus tachycardia - Atypical chest pain   Assessment & Plan    · POTS/IST  Resting HR improving  · outpatient out-of-network cardiologists are Dr Glenn Franks (@ [place] [id number] ) and Dr Peace Calderon (@ [facility]) per prior documentation  · notable history of ablation in 2014 @ [facility]  · intolerant to beta blockers  · EP recommending starting new medication- ivabradine  Our pharmacy does not carry this  Discussed with pharmacist again today- medication approved and will likely be delivered today   · EP prefers to keep patient inpatient until symptoms better controlled  · Asked patient to cancel her flight to [place] tomorrow - medical note provided      Migraines   Assessment & Plan    · Reports history of these - improved today   · Currently pain is better but if recurring, could trial migraine cocktail     History of ectopic pregnancy   Assessment & Plan    · pregnancy test this admission negative     Impaired left ventricular function   Assessment & Plan    · Echo from November 16 2018 with LVEF 50% (was 45% prior per outpatient Cardiology records)       VTE Pharmacologic Prophylaxis:   Pharmacologic: Pharmacologic VTE Prophylaxis contraindicated due to ambulatory   Mechanical VTE Prophylaxis in Place: Yes    Patient Centered Rounds: I have performed bedside rounds with nursing staff today  Discussions with Specialists or Other Care Team Provider: nursing, pharmacy     Education and Discussions with Family / Patient: patient     Time Spent for Care: 30 minutes  More than 50% of total time spent on counseling and coordination of care as described above      Current Length of Stay: 3 day(s)    Current Patient Status: Inpatient   Certification Statement: The patient will continue to require additional inpatient hospital stay due to ongoing EP workup and management of sinus tachycardia     Discharge Plan / Estimated Discharge Date: pending     Code Status: Level 1 - Full Code    Subjective:   Pt seen and examined at bedside  States she feels less lightheaded this morning  Headache improved  Feel pins and needles in her arms and face when she takes the propranolol and midodrine    Objective:     Vitals:   Temp (24hrs), Av 5 °F (36 9 °C), Min:98 3 °F (36 8 °C), Max:98 7 °F (37 1 °C)    Temp:  [98 3 °F (36 8 °C)-98 7 °F (37 1 °C)] 98 5 °F (36 9 °C)  HR:  [61-93] 62  Resp:  [16-20] 16  BP: (100-126)/(55-66) 106/55  SpO2:  [95 %-100 %] 99 %  Body mass index is 23 35 kg/m²  Input and Output Summary (last 24 hours): Intake/Output Summary (Last 24 hours) at 18 0854  Last data filed at 18 1824   Gross per 24 hour   Intake              480 ml   Output                0 ml   Net              480 ml     Physical Exam:     Physical Exam   Constitutional: She is oriented to person, place, and time  She appears well-developed and well-nourished  Ambulating in room    HENT:   Head: Normocephalic and atraumatic  Eyes: EOM are normal  No scleral icterus  Neck: Normal range of motion  Neck supple  Cardiovascular: Normal rate, regular rhythm and normal heart sounds  No murmur heard  Pulmonary/Chest: Effort normal and breath sounds normal    Abdominal: Soft  Bowel sounds are normal    Musculoskeletal: Normal range of motion  She exhibits no edema  Neurological: She is alert and oriented to person, place, and time  Skin: Skin is warm and dry  Psychiatric: She has a normal mood and affect  Nursing note and vitals reviewed      Additional Data:    Labs:      Results from last 7 days  Lab Units 18  0410 18  1301   WBC Thousand/uL 7 58 8 16   HEMOGLOBIN g/dL 12 1 12 9   HEMATOCRIT % 35 6 39 6   PLATELETS Thousands/uL 195 206   NEUTROS PCT %  --  61 LYMPHS PCT %  --  32   MONOS PCT %  --  6   EOS PCT %  --  1       Results from last 7 days  Lab Units 11/30/18  0410 11/29/18  1301   POTASSIUM mmol/L 3 8 4 1   CHLORIDE mmol/L 106 105   CO2 mmol/L 26 26   BUN mg/dL 13 7   CREATININE mg/dL 0 73 0 73   CALCIUM mg/dL 9 0 9 4   ALK PHOS U/L  --  55   ALT U/L  --  20   AST U/L  --  11           * I Have Reviewed All Lab Data Listed Above  * Additional Pertinent Lab Tests Reviewed: [address] Admission Reviewed    Imaging:    Imaging Reports Reviewed Today Include: all  Imaging Personally Reviewed by Myself Includes:  none    Recent Cultures (last 7 days):           Last 24 Hours Medication List:     Current Facility-Administered Medications:  acetaminophen 650 mg Oral Q6H PRN Melodie Bruce PA-C   midodrine 2 5 mg Oral TID Chery Kennedy MD   NON FORMULARY 5 mg Oral Q12H Chery Kennedy MD   ondansetron 4 mg Intravenous Q6H PRN Melodie Bruce PA-C   propranolol 20 mg Oral Q8H [address] Chery Kennedy MD   traMADol 50 mg Oral Q6H PRN Melodie Bruce PA-C        Today, Patient Was Seen By: Cole Gimenez PA-C    ** Please Note: This note has been constructed using a voice recognition system   **

## 2018-12-05 NOTE — ASSESSMENT & PLAN NOTE
· Reports history of these - improved today   · Currently pain is better but if recurring, could trial migraine cocktail

## 2018-12-05 NOTE — PROGRESS NOTES
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  · Stress echo - 3/2018 - [facility] - LVEF 50%, mild-moderate MR at rest, 6 min 40 sec exercise, 91% MPHR, stopped due to dizziness, negative for ischemia  3  H/o frequent PVCs s/p PVC ablation (2013, Dr Coy at [address] [place] [address])  · Had 18-20% PVC burden in the past and LVEF had dropped to 40-45% in 2013  4  ?H/o endometriosis     Outpatient cardiologist: Haylie Brine       Plan  · BP remains on lower side /50-65  · Was started on propranolol 20 tid with midodrine 2 5 tid to counteract hypotension  · Started using compression stockings yesterday  · Resting HR this morning has improved to 60-70s, with rates going up to 100s  · Her palpitations seem to be slightly better today, but she still has dizziness  · Ivabradine is now available with hospital pharmacy, and we will switch her to the same today  · Discontinue propranolol and midodrine  · Outpatient prescription pre-authorization request for Ivabradine has been sent to her insurance, and is still pending a decision    Subjective:   Her headache is better today  Palpitations also seem to be a bit better, but dizziness seems to be a bit worse  Review of Systems  No c/o chest pain, c/o palpitations, No c/o shortness of breath, c/o dizziness or light-headedness, No c/o abdominal pain, no c/o nausea/vomitting  All other systems negative    Telemetry Review: NSR, baseline HR decreased to 60-80 this morning  Sinus tachycardia with HR to 100s    Objective:   Vitals: Blood pressure 106/55, pulse 62, temperature 98 5 °F (36 9 °C), temperature source Oral, resp   rate 16, height 5' 1" (1 549 m), weight 56 1 kg (123 lb 9 6 oz), last menstrual period 11/22/2018, SpO2 99 %, currently breastfeeding , Body mass index is 23 35 kg/m² ,   Orthostatic Blood Pressures      Most Recent Value   Blood Pressure  106/55 filed at 12/05/2018 0743   Patient Position - Orthostatic VS  Lying filed at 12/05/2018 8844         Systolic (37OWM), BMM:698 , Min:100 , SVP:191     Diastolic (71CDH), DXD:96, Min:55, Max:66    Wt Readings from Last 5 Encounters:   11/30/18 56 1 kg (123 lb 9 6 oz)   04/20/17 54 5 kg (120 lb 4 oz)   08/30/16 70 8 kg (156 lb)   08/29/16 70 3 kg (155 lb)   08/26/16 69 4 kg (153 lb)     I/O       12/02 0701 - 12/03 0700 12/03 0701 - 12/04 0700 12/04 0701 - 12/05 0700    P  O  360 180     Total Intake(mL/kg) 360 (6 4) 180 (3 2)     Net +360 +180                       Physical Exam    Constitutional:  Fuentes Figueroa appears well, alert and oriented x 3, pleasant and cooperative  No acute distress   HEENT:    Normocephalic, atraumatic   Neck:  Supple, No JVD   Lungs:  Clear to auscultation bilaterally, respirations unlabored    Chest Wall:  No tenderness or deformity    Heart::  Regular rate, Regular rhythm, S1 and S2 normal, 2/6 systolic murmur+, No rub or gallop    Abdomen:  Soft, non-tender, non-distended   Neurological:  Awake, alert, oriented x3   Non-focal exam    Extremities:  No pedal edema, No calf tenderness         Laboratory Results:    Results from last 7 days  Lab Units 11/30/18  0410 11/30/18  0249 11/30/18  0008   TROPONIN I ng/mL <0 02 <0 02 <0 02       CBC with diff:     Results from last 7 days  Lab Units 11/30/18  0410 11/29/18  1301   WBC Thousand/uL 7 58 8 16   HEMOGLOBIN g/dL 12 1 12 9   HEMATOCRIT % 35 6 39 6   MCV fL 89 90   PLATELETS Thousands/uL 195 206   MCH pg 30 3 29 3   MCHC g/dL 34 0 32 6   RDW % 12 3 12 3   MPV fL 11 2 10 9   NRBC AUTO /100 WBCs  --  0         CMP:    Results from last 7 days  Lab Units 11/30/18  0410 11/29/18  1301   POTASSIUM mmol/L 3 8 4 1   CHLORIDE mmol/L 106 105   CO2 mmol/L 26 26   BUN mg/dL 13 7   CREATININE mg/dL 0 73 0 73   CALCIUM mg/dL 9 0 9 4   AST U/L  --  11   ALT U/L  --  20   ALK PHOS U/L  --  55 EGFR ml/min/1 73sq m 109 109         BMP:    Results from last 7 days  Lab Units 11/30/18  0410 11/29/18  1301   POTASSIUM mmol/L 3 8 4 1   CHLORIDE mmol/L 106 105   CO2 mmol/L 26 26   BUN mg/dL 13 7   CREATININE mg/dL 0 73 0 73   CALCIUM mg/dL 9 0 9 4       BNP: No results for input(s): BNP in the last 72 hours  Magnesium:     Results from last 7 days  Lab Units 12/03/18  1709   MAGNESIUM mg/dL 2 1       Coags:       TSH:        Hemoglobin A1C       Lipid Profile:       Meds/Allergies   all current active meds have been reviewed and current meds:   Current Facility-Administered Medications   Medication Dose Route Frequency    acetaminophen (TYLENOL) tablet 650 mg  650 mg Oral Q6H PRN    midodrine (PROAMATINE) tablet 2 5 mg  2 5 mg Oral TID    NON FORMULARY  5 mg Oral Q12H    ondansetron (ZOFRAN) injection 4 mg  4 mg Intravenous Q6H PRN    propranolol (INDERAL) tablet 20 mg  20 mg Oral Q8H [address]    traMADol (ULTRAM) tablet 50 mg  50 mg Oral Q6H PRN     Prescriptions Prior to Admission   Medication    propranolol (INDERAL) 10 mg tablet            Cardiac testing:   No results found for this or any previous visit  No results found for this or any previous visit  No results found for this or any previous visit  No results found for this or any previous visit  Counseling / Coordination of Care  Total floor / unit time spent today 25 minutes  Greater than 50% of total time was spent with the patient and / or family counseling and / or coordination of care  A description of the counseling / coordination of care: Obtained history, performed physical examination, discussed laboratory and imaging results, and explained further plan of care

## 2018-12-05 NOTE — ASSESSMENT & PLAN NOTE
· POTS/IST  Resting HR improving  · outpatient out-of-network cardiologists are Dr Magui Candelaria (@ [facility] and Dr Maria R Samuel (@ [facility]) per prior documentation  · notable history of ablation in 2014 @ [facility]  · intolerant to beta blockers  · EP recommending starting new medication- ivabradine  Our pharmacy does not carry this   Discussed with pharmacist again today- medication approved and will likely be delivered today   · EP prefers to keep patient inpatient until symptoms better controlled  · Asked patient to cancel her flight to [address] [place] [address] note provided

## 2018-12-06 PROCEDURE — 99232 SBSQ HOSP IP/OBS MODERATE 35: CPT | Performed by: PHYSICIAN ASSISTANT

## 2018-12-06 RX ORDER — MIDODRINE HYDROCHLORIDE 5 MG/1
2.5 TABLET ORAL
Status: DISCONTINUED | OUTPATIENT
Start: 2018-12-06 | End: 2018-12-07

## 2018-12-06 RX ADMIN — IVABRADINE 5 MG: 5 TABLET, FILM COATED ORAL at 09:05

## 2018-12-06 RX ADMIN — SODIUM CHLORIDE 250 ML: 0.9 INJECTION, SOLUTION INTRAVENOUS at 11:46

## 2018-12-06 RX ADMIN — IVABRADINE 5 MG: 5 TABLET, FILM COATED ORAL at 22:32

## 2018-12-06 RX ADMIN — MIDODRINE HYDROCHLORIDE 2.5 MG: 5 TABLET ORAL at 17:31

## 2018-12-06 NOTE — PROGRESS NOTES
Progress Note - Nahum Gonsalves 1985, 35 y o  female MRN: [id number]    Unit/Bed#: CW2 217-03 Encounter: [id number]    Primary Care Provider: Brook De La Rosa MD   Date and time admitted to hospital: 11/29/2018  4:15 PM        * Inappropriate sinus tachycardia - Atypical chest pain   Assessment & Plan    · POTS/IST  Resting HR improving  · outpatient out-of-network cardiologists are Dr Ashwin Arboleda (@ [place] [id number] ) and Dr Regan Foley (@ [facility]) per prior documentation  · notable history of ablation in 2014 @ [facility]  · intolerant to beta blockers  · Started new medication, ivabradine, last evening  This morning was dizzy and developed tunnel vision upon standing  BP laying down 89 systolic  Fluid bolus ordered  Will discuss with EP  Migraines   Assessment & Plan    · Reports history of these - improved today   · Currently pain is better but if recurring, could trial migraine cocktail     History of ectopic pregnancy   Assessment & Plan    · pregnancy test this admission negative     Impaired left ventricular function   Assessment & Plan    · Echo from November 16 2018 with LVEF 50% (was 45% prior per outpatient Cardiology records)       VTE Pharmacologic Prophylaxis:   Pharmacologic: Pharmacologic VTE Prophylaxis contraindicated due to low risk  Mechanical VTE Prophylaxis in Place: No    Patient Centered Rounds: I have performed bedside rounds with nursing staff today  Discussions with Specialists or Other Care Team Provider: Dio feliz EP    Education and Discussions with Family / Patient: Patient    Time Spent for Care: 30 minutes  More than 50% of total time spent on counseling and coordination of care as described above  Current Length of Stay: 4 day(s)    Current Patient Status: Inpatient   Certification Statement: The patient will continue to require additional inpatient hospital stay due to orthostasis, low BP    Discharge Plan: None yet   To discuss plan with EP    Code Status: Level 1 - Full Code      Subjective:   Patient not feeling well this morning  Started ivabradine last night and felt very fatigued after  This morning, patient got out of bed and felt lightheaded and developed tunnel vision  She felt she might pass out and got herself back in bed  She still wasn't feeling well and BP laying down was 89 systolic  Objective:     Vitals:   Temp (24hrs), Av 2 °F (36 8 °C), Min:97 8 °F (36 6 °C), Max:98 6 °F (37 °C)    Temp:  [97 8 °F (36 6 °C)-98 6 °F (37 °C)] 97 9 °F (36 6 °C)  HR:  [60-83] 68  Resp:  [16-18] 18  BP: ()/(50-59) 89/50  SpO2:  [98 %-99 %] 99 %  Body mass index is 23 35 kg/m²  Input and Output Summary (last 24 hours): Intake/Output Summary (Last 24 hours) at 18 1147  Last data filed at 18 1715   Gross per 24 hour   Intake              720 ml   Output                0 ml   Net              720 ml       Physical Exam:     Physical Exam   Constitutional: She is oriented to person, place, and time  No distress  HENT:   Head: Normocephalic and atraumatic  Eyes: No scleral icterus  Neck: Neck supple  Cardiovascular: Normal rate, regular rhythm and normal heart sounds  Pulmonary/Chest: Effort normal and breath sounds normal  No respiratory distress  She has no wheezes  She has no rales  Musculoskeletal: Normal range of motion  Neurological: She is alert and oriented to person, place, and time  Skin: Skin is warm and dry  She is not diaphoretic  Psychiatric: She has a normal mood and affect         Additional Data:     Labs:      Results from last 7 days  Lab Units 18  0410 18  1301   WBC Thousand/uL 7 58 8 16   HEMOGLOBIN g/dL 12 1 12 9   HEMATOCRIT % 35 6 39 6   PLATELETS Thousands/uL 195 206   NEUTROS PCT %  --  61   LYMPHS PCT %  --  32   MONOS PCT %  --  6   EOS PCT %  --  1       Results from last 7 days  Lab Units 18  0410 18  1301   POTASSIUM mmol/L 3 8 4 1   CHLORIDE mmol/L 106 105   CO2 mmol/L 26 26 BUN mg/dL 13 7   CREATININE mg/dL 0 73 0 73   CALCIUM mg/dL 9 0 9 4   ALK PHOS U/L  --  55   ALT U/L  --  20   AST U/L  --  11           * I Have Reviewed All Lab Data Listed Above  * Additional Pertinent Lab Tests Reviewed: All Labs Within Last 24 Hours Reviewed    Imaging:    Imaging Reports Reviewed Today Include: None  Imaging Personally Reviewed by Myself Includes:  None    Recent Cultures (last 7 days):           Last 24 Hours Medication List:     Current Facility-Administered Medications:  acetaminophen 650 mg Oral Q6H PRN Radha Michelle PA-C    ivabradine HCl 5 mg Oral Q12H Diaz Hodges MD    ondansetron 4 mg Intravenous Q6H PRN Radha Michelle PA-C    sodium chloride 250 mL Intravenous Once Farrah Rosales PA-C Last Rate: 250 mL (12/06/18 1146)        Today, Patient Was Seen By: Farrah Rosales PA-C    ** Please Note: Dragon 360 Dictation voice to text software may have been used in the creation of this document   **

## 2018-12-06 NOTE — UTILIZATION REVIEW
Continued Stay Review    Date: 12/6    Vital Signs: BP (!) 89/50 (BP Location: Left arm) Comment: nurse notified  Pulse 68   Temp 97 9 °F (36 6 °C) (Oral)   Resp 18   Ht 5' 1" (1 549 m)   Wt 56 1 kg (123 lb 9 6 oz)   LMP 11/22/2018 (Approximate)   SpO2 99%   BMI 23 35 kg/m²      Date/Time  Temp  Pulse  Resp  BP  SpO2  O2 Device  Patient Position - Orthostatic VS   12/06/18 1118  97 9 °F (36 6 °C)  68  18   89/50  99 %  None (Room air)  Lying   BP: nurse notified at 12/06/18 1118   12/06/18 0730  98 5 °F (36 9 °C)  70  18  92/54  98 %  None (Room air)  Lying   12/06/18 0340  97 8 °F (36 6 °C)  60  16  97/52  98 %  None (Room air)  Lying       Medications:   Scheduled Meds:   Current Facility-Administered Medications:  ivabradine HCl 5 mg Oral Q12H   sodium chloride 250 mL Intravenous Once     Continuous Infusions:    PRN Meds:   acetaminophen    ondansetron    Abnormal Labs/Diagnostic Results:   No new    Age/Sex: 35 y o  female     Assessment/Plan:   12/6 Progress notes:  Tele monitoring  Inappropriate sinus tachycardia - Atypical chest pain/ Migraines/ Impaired  Resting HR improving  Intolerant to beta blockers  Started new medication, ivabradine, last evening  This morning was dizzy and developed tunnel vision upon standing  BP laying down 89 systolic  Fluid bolus ordered  Will discuss with EP      Discharge Plan: TBD

## 2018-12-06 NOTE — SOCIAL WORK
DC plan follow up:    CM met with the patient to review dc plan  Pt reported that she will dc home with samples of DC med Ivabradine, if med tolerated while in hospital, and then she will have new insurance assess cost of medication in January 2019  Pt reported she received a medical note from JOHN Kimball to assist with re-imbursement of flight fees lost d/t admission and medical care needs  Pt had tickets to go to [place] this am at 5 for a birthday travel gift to visit her best friend  Pt denied any other dc needs at this time  PADMINI Polanco advised

## 2018-12-06 NOTE — ASSESSMENT & PLAN NOTE
· POTS/IST  Resting HR improving  · outpatient out-of-network cardiologists are Dr Jose Enrique Dave (@ [place] [id number] ) and Dr Franky Moya (@ [facility]) per prior documentation  · notable history of ablation in 2014 @ [facility]  · intolerant to beta blockers  · Started new medication, ivabradine, last evening  This morning was dizzy and developed tunnel vision upon standing  BP laying down 89 systolic  Fluid bolus ordered  Will discuss with EP

## 2018-12-07 VITALS
TEMPERATURE: 98.8 F | HEART RATE: 60 BPM | DIASTOLIC BLOOD PRESSURE: 54 MMHG | OXYGEN SATURATION: 99 % | RESPIRATION RATE: 17 BRPM | HEIGHT: 61 IN | SYSTOLIC BLOOD PRESSURE: 112 MMHG | BODY MASS INDEX: 23.33 KG/M2 | WEIGHT: 123.6 LBS

## 2018-12-07 LAB
ANION GAP SERPL CALCULATED.3IONS-SCNC: 8 MMOL/L (ref 4–13)
BUN SERPL-MCNC: 11 MG/DL (ref 5–25)
CALCIUM SERPL-MCNC: 8.4 MG/DL (ref 8.3–10.1)
CHLORIDE SERPL-SCNC: 107 MMOL/L (ref 100–108)
CO2 SERPL-SCNC: 23 MMOL/L (ref 21–32)
CREAT SERPL-MCNC: 0.68 MG/DL (ref 0.6–1.3)
ERYTHROCYTE [DISTWIDTH] IN BLOOD BY AUTOMATED COUNT: 12.1 % (ref 11.6–15.1)
GFR SERPL CREATININE-BSD FRML MDRD: 115 ML/MIN/1.73SQ M
GLUCOSE SERPL-MCNC: 85 MG/DL (ref 65–140)
HCT VFR BLD AUTO: 38.5 % (ref 34.8–46.1)
HGB BLD-MCNC: 13.2 G/DL (ref 11.5–15.4)
MCH RBC QN AUTO: 30.1 PG (ref 26.8–34.3)
MCHC RBC AUTO-ENTMCNC: 34.3 G/DL (ref 31.4–37.4)
MCV RBC AUTO: 88 FL (ref 82–98)
PLATELET # BLD AUTO: 188 THOUSANDS/UL (ref 149–390)
PMV BLD AUTO: 11.2 FL (ref 8.9–12.7)
POTASSIUM SERPL-SCNC: 4 MMOL/L (ref 3.5–5.3)
RBC # BLD AUTO: 4.38 MILLION/UL (ref 3.81–5.12)
SODIUM SERPL-SCNC: 138 MMOL/L (ref 136–145)
WBC # BLD AUTO: 8.03 THOUSAND/UL (ref 4.31–10.16)

## 2018-12-07 PROCEDURE — 99232 SBSQ HOSP IP/OBS MODERATE 35: CPT | Performed by: INTERNAL MEDICINE

## 2018-12-07 PROCEDURE — 80048 BASIC METABOLIC PNL TOTAL CA: CPT | Performed by: PHYSICIAN ASSISTANT

## 2018-12-07 PROCEDURE — 85027 COMPLETE CBC AUTOMATED: CPT | Performed by: PHYSICIAN ASSISTANT

## 2018-12-07 PROCEDURE — 99239 HOSP IP/OBS DSCHRG MGMT >30: CPT | Performed by: PHYSICIAN ASSISTANT

## 2018-12-07 RX ORDER — MIDODRINE HYDROCHLORIDE 5 MG/1
5 TABLET ORAL
Status: DISCONTINUED | OUTPATIENT
Start: 2018-12-07 | End: 2018-12-07 | Stop reason: HOSPADM

## 2018-12-07 RX ORDER — MIDODRINE HYDROCHLORIDE 5 MG/1
5 TABLET ORAL
Qty: 90 TABLET | Refills: 0 | Status: SHIPPED | OUTPATIENT
Start: 2018-12-08 | End: 2019-01-24 | Stop reason: ALTCHOICE

## 2018-12-07 RX ADMIN — IVABRADINE 5 MG: 5 TABLET, FILM COATED ORAL at 09:27

## 2018-12-07 RX ADMIN — MIDODRINE HYDROCHLORIDE 2.5 MG: 5 TABLET ORAL at 06:40

## 2018-12-07 RX ADMIN — ACETAMINOPHEN 650 MG: 325 TABLET ORAL at 09:27

## 2018-12-07 RX ADMIN — MIDODRINE HYDROCHLORIDE 5 MG: 5 TABLET ORAL at 15:57

## 2018-12-07 RX ADMIN — MIDODRINE HYDROCHLORIDE 5 MG: 5 TABLET ORAL at 11:52

## 2018-12-07 RX ADMIN — ACETAMINOPHEN 650 MG: 325 TABLET ORAL at 15:59

## 2018-12-07 NOTE — DISCHARGE INSTRUCTIONS
MIDODRINE 5 MG IMMEDIATELY UPON AWAKENING, 4-6 HOURS LATER AND 4-6 HOURS LATER    IF BLOOD PRESSURE VERY LOW IN THE MORNING OR YOU ARE FEELING POORLY, YOU CAN TAKE 10 MG OF MIDODRINE IN THE MORNING RATHER THAN FIVE    PLEASE RECORD BLOOD PRESSURE DAILY AND CALL CARDIOLOGIST IF BLOOD PRESSURE >140 SO THAT YOU CAN BE INSTRUCTED ON WHAT TO DO WITH MIDODRINE DOSAGE    REFERRAL MADE TO NEUROLOGY REGARDING MIGRAINE HEADACHES AND DYSAUTONOMIA  PLEASE FOLLOW-UP WITH NEUROLOGIST    RECOMMEND EVALUATION AT [facility] OR [facility] DYSAUTONOMIA CLINIC    CARDIOLOGY WORKING ON OBTAINING IVABRADINE  IN THE MEANTIME, YOU CAN TAKE PROPRANOLOL IF YOU ARE HAVING SYMPTOMS

## 2018-12-07 NOTE — ASSESSMENT & PLAN NOTE
· POTS/IST  · outpatient out-of-network cardiologists are Dr Julian Grimaldo (@ [place] [id number] ) and Dr Kimberlee Vizcarra (@ [facility]) per prior documentation  · notable history of ablation in 2014 @ [facility]  · intolerant to beta blockers  · Started new medication, ivabradine  HRs improved but symptomatically she is not feeling much better and this is causing her frustration and anxiety about how to manage at home  · BPs have been low- started on midodrine yesterday and BP this morning still 18M systolic  Will increase midodrine- discussed with EP  · Unfortunately I don't think patient is going to feel 100% better prior to discharge  At this point, will work on improving blood pressure  EP looking into samples for ivabradine for patient to take at home prior to her insurance changing on the first of the year (at that point can work on [address] with insurance)  Once we have accomplished these things we can work on discharge home  188

## 2018-12-07 NOTE — DISCHARGE SUMMARY
Discharge- Shira Zamudio 1985, 35 y o  female MRN: [id number]    Unit/Bed#: Jaswinder Higgins 217-03 Encounter: [id number]    Primary Care Provider: Aron Hay MD   Date and time admitted to hospital: 11/29/2018  4:15 PM    * Inappropriate sinus tachycardia - Atypical chest pain   Assessment & Plan    · POTS/IST  · notable history of ablation in 2014 @ [facility]  · intolerant to beta blockers  · Started new medication, ivabradine  EP to arrange for samples- patient probably will not be able to obtain medication until next week  Spoke to cardiology and okay to discharge without medication until this can be obtained  In the meantime, she can take propranolol that she has at home as needed for symptoms  · BPs have been low- started on midodrine  · Continue f/u with EP- recommended neuro eval for migraines and dysautonomia as well as eval a large [facility] such as [facility] or [facility] [address] [facility] [address]         Impaired left ventricular function   Assessment & Plan    · Echo from November 16 2018 with LVEF 50% (was 45% prior per outpatient Cardiology records)       Discharging Physician / Practitioner: Giovanny Chahal PA-C  PCP: Aron Hay MD  Admission Date:   Admission Orders     [facility]        [facility]  Once         11/29/18 1815  Place in Observation (expected length of stay for this patient is less than two midnights)  Once             Discharge Date: 12/07/18    Resolved Problems  Date Reviewed: 12/7/2018    None          Consultations During Hospital Stay:  · Electrophysiology    Procedures Performed:     · None    Significant Findings / Test Results:     · POTS  · Chest x-ray: no acute disease    Incidental Findings:   · None     Test Results Pending at Discharge (will require follow up):    · None     Outpatient Tests Requested:  · None    Complications:  None    Reason for Admission: Tachycardia    Hospital Course:     Shira Zamudio is a 35 y o  female patient with a history of prior SVT ablation who originally presented to the hospital on 11/29/2018 due to chest pain, palpitations, lightheadedness, and exertional dyspnea  She was evaluated by cardiology and diagnosed with POTS/ITS  She had been noted to be intolerant to beta blockers in the past  She was started on new medication, ivabradine, per EP recommendations with improvement in heart rate  She has also been having issues with hypotension and is started on midodrine  She will need cardiology f/u upon discharge  Please see above list of diagnoses and related plan for additional information  Condition at Discharge: stable     Discharge Day Visit / Exam:     * Please refer to separate progress note for these details *    Discussion with Family: None    Discharge instructions/Information to patient and family:   See after visit summary for information provided to patient and family  Provisions for Follow-Up Care:  See after visit summary for information related to follow-up care and any pertinent home health orders  Disposition:     Home    For Discharges to [facility] SNF:   · Not Applicable to this Patient - Not Applicable to this Patient    Planned Readmission: None     Discharge Statement:  I spent 35 minutes discharging the patient  This time was spent on the day of discharge  I had direct contact with the patient on the day of discharge  Greater than 50% of the total time was spent examining patient, answering all patient questions, arranging and discussing plan of care with patient as well as directly providing post-discharge instructions  Additional time then spent on discharge activities  Discharge Medications:  See after visit summary for reconciled discharge medications provided to patient and family        ** Please Note: This note has been constructed using a voice recognition system **

## 2018-12-07 NOTE — PROGRESS NOTES
Progress Note - Jeni Daley 1985, 35 y o  female MRN: [id number]    Unit/Bed#: CW2 217-03 Encounter: [id number]    Primary Care Provider: Fernandez Arriaza MD   Date and time admitted to hospital: 11/29/2018  4:15 PM    * Inappropriate sinus tachycardia - Atypical chest pain   Assessment & Plan    · POTS/IST  · outpatient out-of-network cardiologists are Dr Pilar Hinojosa (@ [place] [id number] ) and Dr Wood Jara (@ [facility]) per prior documentation  · notable history of ablation in 2014 @ [facility]  · intolerant to beta blockers  · Started new medication, ivabradine  HRs improved but symptomatically she is not feeling much better and this is causing her frustration and anxiety about how to manage at home  · BPs have been low- started on midodrine yesterday and BP this morning still 97H systolic  Will increase midodrine- discussed with EP  · Unfortunately I don't think patient is going to feel 100% better prior to discharge  At this point, will work on improving blood pressure  EP looking into samples for ivabradine for patient to take at home prior to her insurance changing on the first of the year (at that point can work on [address] with insurance)  Once we have accomplished these things we can work on discharge home  Impaired left ventricular function   Assessment & Plan    · Echo from November 16 2018 with LVEF 50% (was 45% prior per outpatient Cardiology records)       VTE Pharmacologic Prophylaxis:   Pharmacologic: Pharmacologic VTE Prophylaxis contraindicated due to low risk  not needed  Mechanical VTE Prophylaxis in Place: No    Patient Centered Rounds: I have performed bedside rounds with nursing staff today  Discussions with Specialists or Other Care Team Provider: Discussed plan with EP physician assistant    Education and Discussions with Family / Patient: Patient    Time Spent for Care: 30 minutes    More than 50% of total time spent on counseling and coordination of care as described above     Current Length of Stay: 5 day(s)    Current Patient Status: Inpatient   Certification Statement: The patient will continue to require additional inpatient hospital stay due to hypotension, symptom management    Discharge Plan: Monitor BPs on increased midodrine and working on obtaining medication for home prior to discharge  Hopeful discharge over the weekend  Code Status: Level 1 - Full Code      Subjective:   Patient frustrated as she feels she is not making much progress  She states her palpitations have improved upon standing but she still feels dizzy and tired and gets a headache with walking  She states fluids here have helped and she was told to drink fluids and eat salt and exercise and wear compression stockings  She is doing all of these things but not feeling better  She worries about how she will manage at home  She was hypotensive this morning  Objective:     Vitals:   Temp (24hrs), Av 7 °F (37 1 °C), Min:98 4 °F (36 9 °C), Max:98 9 °F (37 2 °C)    Temp:  [98 4 °F (36 9 °C)-98 9 °F (37 2 °C)] 98 4 °F (36 9 °C)  HR:  [67-98] 98  Resp:  [16-18] 16  BP: ()/(52-64) 106/59  SpO2:  [95 %-100 %] 98 %  Body mass index is 23 35 kg/m²  Input and Output Summary (last 24 hours):     No intake or output data in the 24 hours ending 18 1137    Physical Exam:     Physical Exam   Constitutional: She is oriented to person, place, and time  No distress  HENT:   Head: Normocephalic and atraumatic  Eyes: No scleral icterus  Neck: Neck supple  Cardiovascular: Normal rate, regular rhythm and normal heart sounds  Pulmonary/Chest: Effort normal and breath sounds normal  No respiratory distress  She has no wheezes  She has no rales  Neurological: She is alert and oriented to person, place, and time  Skin: Skin is warm and dry  She is not diaphoretic     Psychiatric:   Tearful at times       Additional Data:     Labs:      Results from last 7 days  Lab Units 18  0528   WBC Thousand/uL 8 03   HEMOGLOBIN g/dL 13 2   HEMATOCRIT % 38 5   PLATELETS Thousands/uL 188       Results from last 7 days  Lab Units 12/07/18  0528   POTASSIUM mmol/L 4 0   CHLORIDE mmol/L 107   CO2 mmol/L 23   BUN mg/dL 11   CREATININE mg/dL 0 68   CALCIUM mg/dL 8 4           * I Have Reviewed All Lab Data Listed Above  * Additional Pertinent Lab Tests Reviewed: All Labs Within Last 24 Hours Reviewed    Imaging:    Imaging Reports Reviewed Today Include: None  Imaging Personally Reviewed by Myself Includes:  None    Recent Cultures (last 7 days):           Last 24 Hours Medication List:     Current Facility-Administered Medications:  acetaminophen 650 mg Oral Q6H PRN Apoorva Stoner PA-C   ivabradine HCl 5 mg Oral Q12H Vicky Young MD   midodrine 2 5 mg Oral TID AC Clayton Villegas PA-C   ondansetron 4 mg Intravenous Q6H PRN Apoorva Stoner PA-C        Today, Patient Was Seen By: Clayton Villegas PA-C    ** Please Note: Dragon 360 Dictation voice to text software may have been used in the creation of this document   **

## 2018-12-07 NOTE — ASSESSMENT & PLAN NOTE
· POTS/IST  · notable history of ablation in 2014 @ [facility]  · intolerant to beta blockers  · Started new medication, ivabradine  EP to arrange for samples  · BPs have been low- started on midodrine    · Continue f/u with EP

## 2018-12-07 NOTE — PROGRESS NOTES
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  4  H/o frequent PVCs   - PVC burden of 18-20% noted with LVEF drop to 40-45% in 2013   - S/p PVC ablation by Dr Carleen España at [facility] in 2013  5  Migraines  6  IBS    Plan:  1  Patient has received 3 doses of Corlanor in addition midodrine so far  The use of compression stockings, salting of food, and hydration were discussed at length with patient  Will discuss plan with attending to see if there are further medication adjustments at this time to control the sinus tach  Subjective/Objective   Subjective: Patient is a 35year old female who is HOD #8 with history of IST/POTS, preserved EF of 50%, moderate MR, and history of PVCs  She reports feeling "defeated" today  She reports that she had one episode of feeling dizzy when walking around room that was associated with hypotension   She is concerned that she won't have the same means of taking care of her symptoms at home as she does right now in the hospital      Objective:   Vitals: /64 (BP Location: Left arm)   Pulse 80   Temp 98 9 °F (37 2 °C) (Oral)   Resp 18   Ht 5' 1" (1 549 m)   Wt 56 1 kg (123 lb 9 6 oz)   LMP 11/22/2018 (Approximate)   SpO2 98%   BMI 23 35 kg/m²   Vitals:    11/30/18 0900 11/30/18 1605   Weight: 56 7 kg (125 lb) 56 1 kg (123 lb 9 6 oz)     Orthostatic Blood Pressures      Most Recent Value   Blood Pressure  123/64 filed at 12/06/2018 1900   Patient Position - Orthostatic VS  Lying filed at 12/06/2018 1900          No intake or output data in the 24 hours ending 12/06/18 2210    Invasive Devices     Peripheral Intravenous Line            Peripheral IV 12/04/18 Right Antecubital 2 days                            Scheduled Meds:  Current Facility-Administered Medications:  acetaminophen 650 mg Oral Q6H PRN Minesh Fisher PA-C   ivabradine HCl 5 mg Oral Q12H Stephen Melendez MD   midodrine 2 5 mg Oral TID AC Giselle Dudley PA-C   ondansetron 4 mg Intravenous Q6H PRN Minesh Fisher PA-C     Continuous Infusions:   PRN Meds:   acetaminophen    ondansetron    Review of Systems: Lightheadness, dizziness, palpitations  Patient denies chest pain, shortness of breath, abdominal pain    Physical Exam:   GEN: NAD, alert and oriented, well appearing  SKIN: dry without significant lesions or rashes  HEENT: NCAT, PERRL  NECK: No JVD appreciated  CARDIOVASCULAR: RRR, normal S1, S2 without murmurs, rubs, or gallops appreciated  LUNGS: Clear to auscultation bilaterally without wheezes, rhonchi, or rales  ABDOMEN: Soft, nontender, nondistended  EXTREMITIES/VASCULAR: perfused without clubbing, cyanosis, or edema b/l  PSYCH: Normal mood and affect                Lab Results: I have personally reviewed pertinent lab results  Results from last 7 days  Lab Units 11/30/18  0410   WBC Thousand/uL 7 58   HEMOGLOBIN g/dL 12 1   HEMATOCRIT % 35 6   PLATELETS Thousands/uL 195       Results from last 7 days  Lab Units 11/30/18  0410   POTASSIUM mmol/L 3 8   CHLORIDE mmol/L 106   CO2 mmol/L 26   BUN mg/dL 13   CREATININE mg/dL 0 73   CALCIUM mg/dL 9 0           Results from last 7 days  Lab Units 12/03/18  1709   MAGNESIUM mg/dL 2 1         Imaging: I have personally reviewed pertinent reports  No results found for this or any previous visit      TELE: sinus tachycardia in 110's walking around the unit, resting 70-80's      EKG: no new EKGs since 12/2/18      VTE Pharmacologic Prophylaxis: Sequential compression device (Venodyne)   VTE Mechanical Prophylaxis: sequential compression device

## 2018-12-07 NOTE — PROGRESS NOTES
EPS Progress Note - Lilli Barrera 35 y o  female MRN: [id number]    Unit/Bed#: CW2 217-03 Encounter: [id number]      Assessment:  Principal Problem:    Inappropriate sinus tachycardia - Atypical chest pain  Active Problems:    Impaired left ventricular function    History of ectopic pregnancy    Migraines      Plan:  I RECOMMENDATIONS ARE AS FOLLOWS FOR THIS PATIENT:  1  MIDODRINE 5 MG IMMEDIATELY UPON AWAKENING, 4-6 HOURS LATER AND 4-6 HOURS LATER  2   IF SHE WAKES UP AND IS HYPOTENSIVE IN FEELING POORLY SHE CAN TAKE 10 MG OF MIDODRINE IN THE MORNING RATHER THAN FIVE  3  SHE SHOULD BE MAINTAINED ON CORLANOR 5 MG TWICE DAILY  CONSIDERATION FOR OUTPATIENT UP TITRATION  SHE HAS FAILED PROPANOLOL AND METOPROLOL SHE DID NOT TOLERATE EITHER MEDICATION DUE TO HYPOTENSION AND WORSENING HEADACHES  I HAVE PERSONALLY APPEAL TO HER INSURANCE COMPANY FOR CORLANOR TO BE COVERED AS THIS IS A DIRECT TO SINUS NODE BLOCKING AGENT AND HAS ALLOWED US TO CONTROL HER HEART RATE EFFECTIVELY 4  RECOMMEND PT EVALUATION FOR DEB PROTOCOL EXERCISE 5  RECOMMEND NEUROLOGY CONSULTATION REGARDING MIGRAINE HEADACHES AND POSSIBLE DYSAUTONOMIA 6  RECOMMEND EVALUATION AT [place] [id number] AS HER SYMPTOMS GO BEYOND HEART RATE AND BLOOD PRESSURE PROBLEMS 7  I EXPLAINED TO THE PATIENT THAT SHE IS GOING TO HAVE SOME DAYS WHERE SHE FEELS POORLY AND SHE WILL JUST HAVE TO TAKE IT EASY SHE APPEARS TO CURRENTLY FEEL POORLY ALMOST EVERY DAY AND EVEN IF SHE HAS 2-3 DAYS A WEEK WHERE SHE IS FEELING BETTER THIS IS AN IMPROVEMENT ACCORDING TO THIS PATIENT THIS IS BEEN GOING ON FOR YEARS AND IT IS NOT GOING TO IMPROVE IN DAYS OR WEEKS  AS AN OUTPATIENT SHE CAN BE CONSIDERED FOR [address] WHICH IS APPROVED FOR DYSAUTONOMIA  Subjective:   Patient seen and examined  No significant events overnight    SHE STILL COMPLAINS OF THE SAME SYMPTOMS OF FOGGINESS IN THE HEAD LIGHTHEADEDNESS DIZZINESS TACHYCARDIA SHORTNESS OF BREATH AND EXERCISE INTOLERANCE ALTHOUGH SHE DOES ADMIT THAT HER HEART RATE CONTROL IS BETTER  Objective:     Vitals: Blood pressure 112/54, pulse 60, temperature 98 8 °F (37 1 °C), temperature source Oral, resp  rate 17, height 5' 1" (1 549 m), weight 56 1 kg (123 lb 9 6 oz), last menstrual period 11/22/2018, SpO2 99 %, currently breastfeeding , Body mass index is 23 35 kg/m² , Orthostatic Blood Pressures      Most Recent Value   Blood Pressure  112/54 filed at 12/07/2018 1500   Patient Position - Orthostatic VS  Lying filed at 12/07/2018 1500            Intake/Output Summary (Last 24 hours) at 12/07/18 1853  Last data filed at 12/07/18 1741   Gross per 24 hour   Intake              720 ml   Output                0 ml   Net              720 ml       No significant arrhythmias seen on telemetry review    Much better heart rate control      Physical Exam:    GEN: Layla William appears well, alert and oriented x 3, pleasant and cooperative   HEENT: pupils equal, round, and reactive to light; extraocular muscles intact  NECK: supple, no carotid bruits   HEART: regular rhythm, normal S1 and S2, no murmurs, clicks, gallops or rubs   LUNGS: clear to auscultation bilaterally; no wheezes, rales, or rhonchi   ABDOMEN: normal bowel sounds, soft, no tenderness, no distention  EXTREMITIES: peripheral pulses normal; no clubbing, cyanosis, or edema  NEURO: no focal findings   SKIN: normal without suspicious lesions on exposed skin    Medications:      Current Facility-Administered Medications:     acetaminophen (TYLENOL) tablet 650 mg, 650 mg, Oral, Q6H PRN, Bryon Pollard PA-C, 650 mg at 12/07/18 1559    ivabradine HCl (CORLANOR) tablet 5 mg, 5 mg, Oral, Q12H, Ezra Marmolejo MD, 5 mg at 12/07/18 0927    midodrine (PROAMATINE) tablet 5 mg, 5 mg, Oral, TID AC, Gregory Henry PA-C, 5 mg at 12/07/18 1557    ondansetron (Robbi Cagle) injection 4 mg, 4 mg, Intravenous, Q6H PRN, Bart Spain PA-C     Labs & Results:          Results from last 7 days  Lab Units 12/07/18  0528   WBC Thousand/uL 8 03   HEMOGLOBIN g/dL 13 2   HEMATOCRIT % 38 5   PLATELETS Thousands/uL 188           Results from last 7 days  Lab Units 12/07/18  0528   POTASSIUM mmol/L 4 0   CHLORIDE mmol/L 107   CO2 mmol/L 23   BUN mg/dL 11   CREATININE mg/dL 0 68   CALCIUM mg/dL 8 4           Results from last 7 days  Lab Units 12/03/18  1709   MAGNESIUM mg/dL 2 1        Cardiac testing:   No results found for this or any previous visit  No results found for this or any previous visit  No results found for this or any previous visit  No results found for this or any previous visit

## 2018-12-08 NOTE — NURSING NOTE
Reviewed discharge instruction with pt  Answered all her questions and she verbalized understanding

## 2018-12-10 ENCOUNTER — EVALUATION (OUTPATIENT)
Dept: PHYSICAL THERAPY | Facility: CLINIC | Age: 33
End: 2018-12-10
Payer: COMMERCIAL

## 2018-12-10 ENCOUNTER — TELEPHONE (OUTPATIENT)
Dept: NEUROLOGY | Facility: CLINIC | Age: 33
End: 2018-12-10

## 2018-12-10 ENCOUNTER — TELEPHONE (OUTPATIENT)
Dept: CARDIOLOGY CLINIC | Facility: CLINIC | Age: 33
End: 2018-12-10

## 2018-12-10 DIAGNOSIS — I49.8 POTS (POSTURAL ORTHOSTATIC TACHYCARDIA SYNDROME): Primary | ICD-10-CM

## 2018-12-10 PROCEDURE — 97161 PT EVAL LOW COMPLEX 20 MIN: CPT | Performed by: PHYSICAL THERAPIST

## 2018-12-10 NOTE — TELEPHONE ENCOUNTER
Patient called requesting an order for [facility] Protocol and compression stockings which I did place in EPIC  She wanted you to know that she is off all meds, stopped Midodrine as well  States her BP is well controlled  BB have been tough to tolerate  She was referred for [facility] protocol  I discussed proper fluid intake with patient  She has follow-up appointment with Dr Catrina Claude end of January

## 2018-12-10 NOTE — UTILIZATION REVIEW
Notification of Discharge  This is a Notification of Discharge from our facility [address]  Please be advised that this patient has been discharge from our facility  Below you will find the admission and discharge date and time including the patients disposition  PRESENTATION DATE: 11/29/2018  4:15 PM  IP ADMISSION DATE: 12/2/18 1919  DISCHARGE DATE: 12/7/2018  8:44 PM  DISPOSITION: [id number] Utilization Review Department  Phone: [phone]; Fax [phone]  ATTENTION: Please call with any questions or concerns to [phone]  and carefully listen to the prompts so that you are directed to the right person  Send all requests for admission clinical reviews, approved or denied determinations and any other requests to fax [phone]   All voicemails are confidential

## 2018-12-10 NOTE — PROGRESS NOTES
PT Evaluation     Today's date: 12/10/2018  Patient name: Silverio Morales  : 1985  MRN: [id number]  Referring provider: John Santiago DO  Dx: No diagnosis found  Assessment  Assessment details: Patient presents to physical therapy with a diagnosis of POTS with referral for [facility] Protocol  At this time patient has decreased LE strength, left hip pain, decreased tolerance to upright activity, difficulty caring for three small children, decreased tolerance to exercise and decreased knowledge of appropriate exercise routine  Pt will benefit from skilled therapy intervention 2x/week for 4-6 weeks in order to establish program and educate patient on independence in Hep per [facility] protocol  Patient education provided regarding position for exercise, abby protocol, and rating of perceived exertion for exercise  Understanding of Dx/Px/POC: good   Prognosis: good    Goals  STG:  Pt will be able to tolerate exercise symptom free within 4 weeks  Pt will be able to complete strength training painfrana cuellarin 4 weeks    LTG:  Pt will be able to self progress through abby protocol within 6 weeks  Pt will be able to progress patient through strengthening without difficulty in 6 wekes    Plan  Patient would benefit from: skilled physical therapy  Planned therapy interventions: home exercise program, graded exercise, therapeutic exercise and patient education  Frequency: 2x week  Duration in weeks: 4  Plan of Care beginning date: 12/10/2018  Plan of Care expiration date: 3/4/2019  Treatment plan discussed with: patient        Subjective Evaluation    History of Present Illness  Mechanism of injury: Pt was admitted to the hospital for 9 days with reports of chest pain and racing heart  She was diagnosed with inappropriate sinus tacycardia and POTS  Patient ahs not had tilt table test at this time  She has been recommended to complete an exercise program  She reports having symptoms for a few years   She has had two recent pregnancies, she has three children at home  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  She was placedon medication while in the hospital which helped chest pain and HR but was unable to stay on it due to insurance reasons  Pain  Current pain ratin  At best pain ratin  At worst pain rating: 10  Location: hip  Relieving factors: change in position  Aggravating factors: walking and standing    Social Support  Steps to enter house: yes  Stairs in house: yes   Lives in: multiple-level home  Lives with: young children and spouse    Exercise history: none      Diagnostic Tests  No diagnostic tests performed  Treatments  Previous treatment: medication  Patient Goals  Patient goal: safely exercise and improve hip        Objective       Co Morbidities:  Connective Tissue Dysfunction: No  Gastric Dysfunction:No  Sleep Abnormalities:Yes  Temperature Sensitive:Yes  Syncope:No    Current Recommendations:  Compression Socks:Yes  Fluid intake: na  Sleeping elevated:No  Salt intake:increase but not pills  Beta Blocker:Yes    Symptoms with Exercise:  Ligthheaded: Yes  Chest Discomffort: Yes  Mental clouding: Yes  Headache:Yes  Nausea: Yes  Blurred or Tunnel Vision:Yes      Current/Previous Level of Exercise:    MMT to be completed at next session         Posture:FHP      Precautions: POTS    Daily Treatment Diary     Manual                                                                                   Exercise Diary                                                                                                                                                                                                                                                                                      Modalities

## 2018-12-11 DIAGNOSIS — I49.8 POTS (POSTURAL ORTHOSTATIC TACHYCARDIA SYNDROME): Primary | ICD-10-CM

## 2018-12-12 ENCOUNTER — OFFICE VISIT (OUTPATIENT)
Dept: PHYSICAL THERAPY | Facility: CLINIC | Age: 33
End: 2018-12-12
Payer: COMMERCIAL

## 2018-12-12 DIAGNOSIS — I49.8 POTS (POSTURAL ORTHOSTATIC TACHYCARDIA SYNDROME): Primary | ICD-10-CM

## 2018-12-12 PROCEDURE — 97110 THERAPEUTIC EXERCISES: CPT | Performed by: PHYSICAL THERAPIST

## 2018-12-14 ENCOUNTER — ANNUAL EXAM (OUTPATIENT)
Dept: OBGYN CLINIC | Facility: CLINIC | Age: 33
End: 2018-12-14
Payer: COMMERCIAL

## 2018-12-14 VITALS
BODY MASS INDEX: 23.79 KG/M2 | DIASTOLIC BLOOD PRESSURE: 80 MMHG | HEIGHT: 61 IN | SYSTOLIC BLOOD PRESSURE: 122 MMHG | WEIGHT: 126 LBS

## 2018-12-14 DIAGNOSIS — F32.81 PMDD (PREMENSTRUAL DYSPHORIC DISORDER): ICD-10-CM

## 2018-12-14 DIAGNOSIS — I49.8 POTS (POSTURAL ORTHOSTATIC TACHYCARDIA SYNDROME): ICD-10-CM

## 2018-12-14 DIAGNOSIS — Z90.79 HISTORY OF UNILATERAL SALPINGECTOMY: ICD-10-CM

## 2018-12-14 DIAGNOSIS — Z86.79 HISTORY OF SINUS TACHYCARDIA: ICD-10-CM

## 2018-12-14 DIAGNOSIS — Z87.42 HISTORY OF ENDOMETRIOSIS: ICD-10-CM

## 2018-12-14 DIAGNOSIS — Z01.419 WOMEN'S ANNUAL ROUTINE GYNECOLOGICAL EXAMINATION: Primary | ICD-10-CM

## 2018-12-14 DIAGNOSIS — Z86.79 HISTORY OF MITRAL VALVE PROLAPSE: ICD-10-CM

## 2018-12-14 DIAGNOSIS — Z87.59 HISTORY OF ECTOPIC PREGNANCY: ICD-10-CM

## 2018-12-14 PROCEDURE — 99385 PREV VISIT NEW AGE 18-39: CPT | Performed by: OBSTETRICS & GYNECOLOGY

## 2018-12-14 RX ORDER — FLUOXETINE 10 MG/1
10 CAPSULE ORAL DAILY
Qty: 30 CAPSULE | Refills: 2 | Status: SHIPPED | OUTPATIENT
Start: 2018-12-14 | End: 2018-12-14 | Stop reason: CLARIF

## 2018-12-14 RX ORDER — ERGOCALCIFEROL 1.25 MG/1
CAPSULE ORAL
Refills: 0 | COMMUNITY
Start: 2018-11-19 | End: 2019-09-04 | Stop reason: ALTCHOICE

## 2018-12-14 RX ORDER — FLUOXETINE 10 MG/1
10 CAPSULE ORAL DAILY
Qty: 30 CAPSULE | Refills: 2 | Status: SHIPPED | OUTPATIENT
Start: 2018-12-14 | End: 2019-05-10 | Stop reason: SDUPTHER

## 2018-12-14 NOTE — PROGRESS NOTES
Assessment   Annual well-woman exam  History of pelvic endometriosis  Status post right salpingectomy for ectopic pregnancy many years ago  Recent diagnosed with postural orthostatic tachycardia syndrome (POTS)  MVP currently under the care of Cardiology  PMDD  Generalized anxiety disorder        Plan   New start fluoxetine  Follow-up in 6 weeks   All questions answered  Await pap smear results  Subjective here for annual exam   Amaris Luna is a 35 y o  female who presents for annual exam  Periods are regular every 28-30 days, lasting 5 days  Dysmenorrhea:none  Cyclic symptoms include none  No intermenstrual bleeding, spotting, or discharge  The patient reports that there is not domestic violence in her life  Current contraception: none  History of abnormal Pap smear: no  Family history of uterine or ovarian cancer: no  Regular self breast exam: yes  History of abnormal mammogram: no  Family history of breast cancer: no  History of abnormal lipids: no  Menstrual History:  OB History      Para Term  AB Living    5 3 3   2 3    SAB TAB Ectopic Multiple Live Births    1   1 0 3         Menarche age: 15  Patient's last menstrual period was 2018 (approximate)  Review of Systems  Pertinent items are noted in HPI        Objective no acute distress     /80 (BP Location: Left arm, Patient Position: Sitting, Cuff Size: Standard)   Ht 5' 1" (1 549 m)   Wt 57 2 kg (126 lb)   LMP 2018 (Approximate)   BMI 23 81 kg/m²     General:   alert and oriented, in no acute distress, alert, appears stated age and cooperative   Heart: regular rate and rhythm, S1, S2 normal, no murmur, click, rub or gallop   Lungs: clear to auscultation bilaterally   Abdomen: soft, non-tender, without masses or organomegaly   Vulva: normal, Bartholin's, Urethra, [place]'s normal   Vagina: normal mucosa, normal discharge   Cervix: multiparous appearance, no bleeding following Pap, no cervical motion tenderness and no lesions   Uterus: normal size   Adnexa: normal adnexa   Breast exam bilateral breast exam in the sitting supine position with chaperone present no visible palpable breast lesions identified no supraclavicular axillary lymphadenopathy noted no nipple discharge  Reviewed self-breast exam techniques

## 2018-12-19 ENCOUNTER — OFFICE VISIT (OUTPATIENT)
Dept: PHYSICAL THERAPY | Facility: CLINIC | Age: 33
End: 2018-12-19
Payer: COMMERCIAL

## 2018-12-19 DIAGNOSIS — I49.8 POTS (POSTURAL ORTHOSTATIC TACHYCARDIA SYNDROME): Primary | ICD-10-CM

## 2018-12-19 LAB
C TRACH RRNA CVX QL NAA+PROBE: NEGATIVE
CYTOLOGIST CVX/VAG CYTO: NORMAL
DX ICD CODE: NORMAL
HPV I/H RISK 1 DNA CVX QL PROBE+SIG AMP: NEGATIVE
N GONORRHOEA RRNA CVX QL NAA+PROBE: NEGATIVE
OTHER STN SPEC: NORMAL
PATH REPORT.FINAL DX SPEC: NORMAL
SL AMB NOTE:: NORMAL
SL AMB SPECIMEN ADEQUACY: NORMAL
SL AMB TEST METHODOLOGY: NORMAL

## 2018-12-19 PROCEDURE — 97110 THERAPEUTIC EXERCISES: CPT | Performed by: PHYSICAL THERAPIST

## 2018-12-19 NOTE — PROGRESS NOTES
Daily Note     Today's date: 2018  Patient name: Amaris Luna  : 1985  MRN: [id number]  Referring provider: Carolina Freeman DO  Dx:   Encounter Diagnosis     ICD-10-CM    1  POTS (postural orthostatic tachycardia syndrome) R00 0     I95 1                   Subjective:Has been feeling OK  Has not tried exercising yet  Objective: See treatment diary below      Assessment: Patient was able to tolerate the regular recumbent bike and will attempt to buy one for use at home  Next session will focus on strenghtening exercise  Plan: Continue per plan of care       Starting Protocol for Day One:  Training Mode:bike pedals    Month: Week:  Warm up: 10 min          RPE:2       HR:102  Base Pace:3  min       RPE:4         Recovery: 2 min          RPE:2           Base pace:3  Min        RPE:  4      HR:120  Cool Down:   10  Min   RPE:2         Hr:89

## 2018-12-21 ENCOUNTER — OFFICE VISIT (OUTPATIENT)
Dept: PHYSICAL THERAPY | Facility: CLINIC | Age: 33
End: 2018-12-21
Payer: COMMERCIAL

## 2018-12-21 DIAGNOSIS — I49.8 POTS (POSTURAL ORTHOSTATIC TACHYCARDIA SYNDROME): Primary | ICD-10-CM

## 2018-12-21 PROCEDURE — 97110 THERAPEUTIC EXERCISES: CPT | Performed by: PHYSICAL THERAPIST

## 2018-12-21 NOTE — PROGRESS NOTES
Daily Note     Today's date: 2018  Patient name: Tawny Trinh  : 1985  MRN: [id number]  Referring provider: Elena Castellanos DO  Dx:   Encounter Diagnosis     ICD-10-CM    1  POTS (postural orthostatic tachycardia syndrome) R00 0     I95 1                   Subjective:Has been feeling OK today    Objective: See treatment diary below      Assessment: PT had increase in tingling in LE and lightheadedness with exercise but with rest she was able to return to baseline symptoms  Pt is to get bike at home and therapit instructed patient to attempt to begin to progress through [address] calendar beginning with month one day one week one on bike and was issued strengthening HEP  Plan: Continue per plan of care       Starting Protocol for Day One:NP   Training Mode:bike pedals    Month: Week:  Warm up: 10 min          RPE:2       HR:102  Base Pace:3  min       RPE:4         Recovery: 2 min          RPE:2           Base pace:3  Min        RPE:  4      HR:120  Cool Down:   10  Min   RPE:2         Hr:89    Precautions: na    Daily Treatment Diary     Manual                                                                                   Exercise Diary              SLR 3x10 ea            SL hip abd 3x10 ea            clamshells 20x otb            plank 10sec x 3            Lat plank 10sec x 2 ea            bridge [id number]            Bird dog 5 sec x 10 e                                                                                                                                                                                         Modalities

## 2018-12-26 ENCOUNTER — OFFICE VISIT (OUTPATIENT)
Dept: PHYSICAL THERAPY | Facility: CLINIC | Age: 33
End: 2018-12-26
Payer: COMMERCIAL

## 2018-12-26 DIAGNOSIS — I49.8 POTS (POSTURAL ORTHOSTATIC TACHYCARDIA SYNDROME): Primary | ICD-10-CM

## 2018-12-26 PROCEDURE — 97110 THERAPEUTIC EXERCISES: CPT | Performed by: PHYSICAL THERAPIST

## 2018-12-26 NOTE — PROGRESS NOTES
Daily Note     Today's date: 2018  Patient name: Jerman Del Rio  : 1985  MRN: [id number]  Referring provider: Gabriela Spencer DO  Dx:   Encounter Diagnosis     ICD-10-CM    1  POTS (postural orthostatic tachycardia syndrome) R00 0     I95 1                   Subjective:Has been feeling OK today    Objective: See treatment diary below      Assessment: Pt had slight increase in HA this sessionbut overall tolerated biking well  She now has bike for home and will begin to self progress through protocol    Plan: Continue per plan of care       Starting Protocol for Day One:NP   Training Mode:bike pedals    Month: Week:  Warm up: 10 min          RPE:2       HR:109  Base Pace:3  min       RPE:4         Recovery: 2 min          RPE:2           Base pace:3  Min        RPE:  4      HR:108  Cool Down:   8  Min   RPE:2         Hr:111    Precautions: na    Daily Treatment Diary     Manual                                                                                   Exercise Diary              SLR 3x10 ea            SL hip abd 3x10 ea            clamshells 20x otb            plank 10sec x 3            Lat plank 10sec x 2 ea            bridge [id number]            Bird dog 5 sec x 10 e                                                                                                                                                                                         Modalities

## 2018-12-28 ENCOUNTER — APPOINTMENT (OUTPATIENT)
Dept: PHYSICAL THERAPY | Facility: CLINIC | Age: 33
End: 2018-12-28
Payer: COMMERCIAL

## 2019-01-04 ENCOUNTER — OFFICE VISIT (OUTPATIENT)
Dept: PHYSICAL THERAPY | Facility: CLINIC | Age: 34
End: 2019-01-04
Payer: COMMERCIAL

## 2019-01-04 DIAGNOSIS — I49.8 POTS (POSTURAL ORTHOSTATIC TACHYCARDIA SYNDROME): Primary | ICD-10-CM

## 2019-01-04 PROCEDURE — 97110 THERAPEUTIC EXERCISES: CPT | Performed by: PHYSICAL THERAPIST

## 2019-01-04 NOTE — PROGRESS NOTES
Daily Note     Today's date: 2019  Patient name: Fuentes Figueroa  : 1985  MRN: [id number]  Referring provider: Jd Tse DO  Dx:   No diagnosis found  Subjective:Has been feeling OK today    Objective: See treatment diary below      Assessment: Pt had a cold today and general fatigued quicker than last session  Therapist stressed importance of sticking with program and repeating week if she cannot complete 5 full days  Pt voiced understanding  She will be placed on 30 day hold pending need for review of program      Plan: Continue per plan of care       Starting Protocol for Day One:NP   Training Mode:bike pedals    Month: Week:  Warm up: 10 min          RPE:2       HR:109  Base Pace:3  min       RPE:4         Recovery: 2 min          RPE:2           Base pace:3  Min        RPE:  4      HR:108  Cool Down:   8  Min   RPE:2         Hr:111    Precautions: na    Daily Treatment Diary     Manual                                                                                   Exercise Diary              SLR 3x10 ea 3x10           SL hip abd 3x10 ea 3x10           clamshells 20x otb 20x            plank 10sec x 3 [id number]           Lat plank 10sec x 2 ea [id number]           bridge [id number] 15x10           Bird dog 5 sec x 10 e [id number]                                                                                                                                                                                        Modalities

## 2019-01-15 RX ORDER — PROPRANOLOL HYDROCHLORIDE 10 MG/1
TABLET ORAL
COMMUNITY
Start: 2018-12-17 | End: 2019-01-24 | Stop reason: ALTCHOICE

## 2019-01-24 ENCOUNTER — OFFICE VISIT (OUTPATIENT)
Dept: CARDIOLOGY CLINIC | Facility: CLINIC | Age: 34
End: 2019-01-24
Payer: COMMERCIAL

## 2019-01-24 VITALS
HEART RATE: 84 BPM | HEIGHT: 61 IN | BODY MASS INDEX: 23.58 KG/M2 | DIASTOLIC BLOOD PRESSURE: 72 MMHG | WEIGHT: 124.9 LBS | SYSTOLIC BLOOD PRESSURE: 90 MMHG

## 2019-01-24 DIAGNOSIS — I49.8 POTS (POSTURAL ORTHOSTATIC TACHYCARDIA SYNDROME): Primary | ICD-10-CM

## 2019-01-24 PROBLEM — Z87.59 HISTORY OF ECTOPIC PREGNANCY: Status: RESOLVED | Noted: 2018-12-01 | Resolved: 2019-01-24

## 2019-01-24 PROCEDURE — 99205 OFFICE O/P NEW HI 60 MIN: CPT | Performed by: INTERNAL MEDICINE

## 2019-01-24 PROCEDURE — 93000 ELECTROCARDIOGRAM COMPLETE: CPT | Performed by: INTERNAL MEDICINE

## 2019-01-24 NOTE — PROGRESS NOTES
Cardiology Consultation     Lilli Barrera  [id number]  1985  [facility]    1   POTS (postural orthostatic tachycardia syndrome)  POCT ECG    Compression Stocking       History of present illness    The patient has been referred to me by Dr Jean Paul Greenfield for management of POTS    Symptoms started about 10 years and have slowly worsened over time   She had a PVC ablation in 2014  She has 3 children aged 15, 3 and 2  She also has a history of Lyme's disease and has received doxycycline for 4 weeks for the same     Patient does have symptoms involving multiple systems:  -Palpitations, lightheadedness on postural changes  -Headaches, Inability to concentrate at times  -Fatigue and exertional intolerance at times  -Bloating sensation    The patient is not complaining of anginal like chest pain or chest pressure  There is no worsening orthopnea, paroxysmal nocturnal dyspnea  There is no leg swelling    Patient does get palpitation when it happens  There is history of presyncope and one of syncope  There is history of transient  lightheadedness  When patient has these palpitations, it is associated with exertional intolerance      There is no history of snoring at night  There is some history of morning fatigability  There is occasional history of daytime sleepiness      She gets uncomfortable with hot shower and heavy meals         Patient Active Problem List   Diagnosis    Inappropriate sinus tachycardia - Atypical chest pain    Impaired left ventricular function    Moderate mitral valve regurgitation    Vitamin D deficiency    Migraines     Past Medical History:   Diagnosis Date    Acute Lyme disease     Endometriosis     Endometriosis     Mitral valve regurgitation     MVP (mitral valve prolapse)     PVC (premature ventricular contraction)     SVT (supraventricular tachycardia) ([facility] [address] )      Social History     Social History    Marital status: /Civil Union     Spouse name: N/A    Number of children: N/A    Years of education: N/A     Occupational History    Not on file  Social History Main Topics    Smoking status: Former Smoker     Quit date: 8/30/2013    Smokeless tobacco: Never Used    Alcohol use Yes      Comment: rare    Drug use: No    Sexual activity: Yes     Partners: Male     Other Topics Concern    Not on file     Social History Narrative    No narrative on file      No family history on file    Past Surgical History:   Procedure Laterality Date    ABLATION OF DYSRHYTHMIC FOCUS      DIAGNOSTIC LAPAROSCOPY      for endometriosis    LAPAROSCOPY FOR ECTOPIC PREGNANCY         Current Outpatient Prescriptions:     ergocalciferol (VITAMIN D2) 50,000 units, TAKE ONE CAPSULE BY MOUTH WEEKLY FOR 10 WEEKS THEN 2000MG DAILY OTC, Disp: , Rfl: 0    FLUoxetine (PROzac) 10 mg capsule, Take 1 capsule (10 mg total) by mouth daily, Disp: 30 capsule, Rfl: 2  Allergies   Allergen Reactions    Pollen Extract      Vitals:    01/24/19 0907   BP: 90/72   BP Location: Left arm   Patient Position: Sitting   Cuff Size: Standard   Pulse: 84   Weight: 56 7 kg (124 lb 14 4 oz)   Height: 5' 1" (1 549 m)       Labs:  Lab Results   Component Value Date     11/27/2014    K 4 0 12/07/2018    K 4 2 11/27/2014     12/07/2018     11/27/2014    CO2 23 12/07/2018    CO2 19 (L) 11/27/2014    BUN 11 12/07/2018    BUN 7 11/27/2014    CREATININE 0 68 12/07/2018    CREATININE 0 45 (L) 11/27/2014    GLUCOSE 75 11/27/2014    CALCIUM 8 4 12/07/2018    CALCIUM 8 1 (L) 11/27/2014     Lab Results   Component Value Date    TROPONINI <0 02 11/30/2018     Lab Results   Component Value Date    WBC 8 03 12/07/2018    WBC 11 2 (H) 01/26/2016    HGB 13 2 12/07/2018    HGB 13 6 01/26/2016    HCT 38 5 12/07/2018    HCT 41 3 01/26/2016    MCV 88 12/07/2018    MCV 91 2 01/26/2016    PLT 188 12/07/2018     01/26/2016     No results found for: CHOL, TRIG, HDL, LDLDIRECT  Imaging: No results found  Holter   Average heart rate at night 65-79  Average HR during day   Circadian rhythm is retained   This is not suggestive of inappropriate sinus tachycardia              Bedside tilt done  Stand up tilt    Meets both criteria : HR increased by 61 (>40)  within 10 min of standing , from 81 to 142 + HR >120  BP stable   Symptoms reproduced   This is suggestive of POTS          Review of Systems:  Review of Systems   All other systems reviewed and are negative  as described in my history of present illness        Physical Exam:  Physical Exam   Constitutional: She is oriented to person, place, and time  She appears well-developed and well-nourished  No distress  Not in any distress at the current time   HENT:   Head: Normocephalic and atraumatic  Right Ear: External ear normal    Left Ear: External ear normal    Nose: Nose normal    Mouth/Throat: Uvula is midline and mucous membranes are normal    Eyes: Pupils are equal, round, and reactive to light  Conjunctivae, EOM and lids are normal  No scleral icterus  No pallor  No cyanosis  No icterus   Neck: Trachea normal and normal range of motion  Neck supple  No JVD present  Carotid bruit is not present  No thyromegaly present  No jugular lymphadenopathy   Cardiovascular: Normal rate, regular rhythm, S1 normal, S2 normal, normal heart sounds, intact distal pulses and normal pulses  PMI is not displaced  Exam reveals no gallop, no S3, no S4 and no friction rub  No murmur heard  Pulmonary/Chest: Effort normal and breath sounds normal  No accessory muscle usage  No respiratory distress  She has no decreased breath sounds  She has no wheezes  She has no rhonchi  She has no rales  She exhibits no tenderness  Abdominal: Soft  Normal appearance and bowel sounds are normal  She exhibits no distension and no mass   There is no splenomegaly or hepatomegaly  There is no tenderness  Musculoskeletal: Normal range of motion  She exhibits no edema, tenderness or deformity  Lymphadenopathy:     She has no cervical adenopathy  Neurological: She is alert and oriented to person, place, and time  Facial symmetry is retained  Extraocular movements are retained  Head neck tongue and palate movement are retained and symmetric   Skin: Skin is intact  No abrasion, no lesion and no rash noted  No erythema  Nails show no clubbing  Psychiatric: She has a normal mood and affect  Her speech is normal and behavior is normal  Thought content normal                Discussion/Summary:    1   Postural orthostatic tachycardia syndrome    Patient does have symptoms involving multiple systems:  -Palpitations, lightheadedness on postural changes  -Headaches, Inability to concentrate at times  -Fatigue and exertional intolerance at times  -Bloating sensation      Important relevant laboratory parameters:  CBCD- hb -12 1  TSH- 0 61 in Nov 2018  Holter - not s/o IST  Echo - normal as per outside report   TILT - suggestive of POTS        Considering her multisystem symptoms, laboratory parameters - suspected diagnosis is  POTS    The following are my detailed recommendations:  Exercise is the most important component of therapy and has a class IIa recommendation for POTS  therapy    1)  Water intake  Oral 2 1/2 to 3 L a day  This is close to 90 ounce  Is recommended measure      Intravenous saline should only be used as emergency step to prevent hospital admissions  Done in the emergency room, up to a liter to 1 liters over 1 hour  Symptomatic improvement can last several hours to days  Should be used only as rescue therapy in a clinically decompensated patient  Long-term infusion is never recommended      2) Salt intake  upto 10 grams can be used in appropriate patient   Considering her age, risk of hypertension recommend use Gatorade as half her water intake 3) Pressure stockings  Bilateral  Knee high, thigh high or waist high  10 mm, 20 mm, 30 mm, 40 mm Hg  Helps with venous return        4) Exercise -  [place] protocol  Set up appointment with Ms Tigre Robles / Ms Lucianavelvetjuan Brooke  This has both aerobic and muscle training component  Aerobic- will increase vagal tone and control of heart rate  Muscle training of legs- will help with venous return   Will request monthly evaluation of level of exercise tolerance       5) Medications to avoid  Numerous anti depressants and ADHD medications that inhibit NE transporter  TCA  SNRI - duloxetine, venlafaxine, milnacipram  NRI - atomoxetine, reboxetine  OCP containing drosperinine  These can provoke POTS in susceptible healthy volunteers            To be decided later     6) beta blockers  after exercise has been started, if patient still symptomatic with palpitations  Consider low-dose nonselective beta blocker  Propranolol 10 mg up to 3 times daily  Do not increase dose as it may worsen asthma  Also with diabetes should avoid any high dose of beta blocker  Avoid using selective beta blockers like metoprolol        7) Fludrocortisone  This will help with water retention  [employer] [place] affect lasts only 1-2 days  Effectiveness not really tested with randomized clinical trial      8)Midodrine  This is an alpha agonist, constriction of veins and arteries  It increases venous return and reduces orthostatic tachycardia  Effect is less than intravenous line  Rapid onset but only brief effect  It needs to be taken 3 times daily  Can be administered only during a time to avoid supine hypertension      9) Pyridostigmine  Is a peripheral acetylcholine esterase inhibitor  Can be used as an additive to other medication  Has significant side effects of diarrhea, abdominal pain, nausea, increased urinary urgency and frequency      10) Ivabradine  About 60 percent the patient's of symptomatic improvement in open-label  study 11) other medications-central sympathetic light occasions  Clonidine, methyldopa  Both agents can cause drowsiness, fatigue, worsening mental clouding      12) Modafinil  Has been considered for fatigue and cognitive dysfunction  It worsen symptom of tachycardia                Summary of my recommendations   Water intake / addition of Gatorade  Compression stocking  [place]ester protocol exercise   Patient wants to wait before considering propranolol  Other medications should not be used at current time until the first three steps instituted      A total of 100 min spent in case  Discussing care with patient

## 2019-01-24 NOTE — LETTER
January 24, 2019     Rafael Wilder MD  [address]  [place] [address]    Patient: Jose Ferreira   YOB: 1985   Date of Visit: 1/24/2019       Dear Dr Radha Ireland: Thank you for referring Yumiko Rocha to me for evaluation  Below are my notes for this consultation  If you have questions, please do not hesitate to call me  I look forward to following your patient along with you  Sincerely,        Shant Kwok MD        CC: Daniel Rosa, Heron Sifuentes MD  1/24/2019  4:31 PM  Sign at close encounter                                             Cardiology Consultation     Jose Ferreira  [id number]  1985  [facility]  [address] [place] [address] [address]    1   POTS (postural orthostatic tachycardia syndrome)  POCT ECG    Compression Stocking       History of present illness    The patient has been referred to me by Dr Jad Aguayo for management of POTS    Symptoms started about 10 years and have slowly worsened over time   She had a PVC ablation in 2014  She has 3 children aged 15, 3 and 2  She also has a history of Lyme's disease and has received doxycycline for 4 weeks for the same     Patient does have symptoms involving multiple systems:  -Palpitations, lightheadedness on postural changes  -Headaches, Inability to concentrate at times  -Fatigue and exertional intolerance at times  -Bloating sensation    The patient is not complaining of anginal like chest pain or chest pressure  There is no worsening orthopnea, paroxysmal nocturnal dyspnea  There is no leg swelling    Patient does get palpitation when it happens  There is history of presyncope and one of syncope  There is history of transient  lightheadedness  When patient has these palpitations, it is associated with exertional intolerance      There is no history of snoring at night  There is some history of morning fatigability  There is occasional history of daytime sleepiness      She gets uncomfortable with hot shower and heavy meals         Patient Active Problem List   Diagnosis    Inappropriate sinus tachycardia - Atypical chest pain    Impaired left ventricular function    Moderate mitral valve regurgitation    Vitamin D deficiency    Migraines     Past Medical History:   Diagnosis Date    Acute Lyme disease     Endometriosis     Endometriosis     Mitral valve regurgitation     MVP (mitral valve prolapse)     PVC (premature ventricular contraction)     SVT (supraventricular tachycardia) (HCC)      Social History     Social History    Marital status: /Civil Union     Spouse name: N/A    Number of children: N/A    Years of education: N/A     Occupational History    Not on file  Social History Main Topics    Smoking status: Former Smoker     Quit date: 8/30/2013    Smokeless tobacco: Never Used    Alcohol use Yes      Comment: rare    Drug use: No    Sexual activity: Yes     Partners: Male     Other Topics Concern    Not on file     Social History Narrative    No narrative on file      No family history on file    Past Surgical History:   Procedure Laterality Date    ABLATION OF DYSRHYTHMIC FOCUS      DIAGNOSTIC LAPAROSCOPY      for endometriosis    LAPAROSCOPY FOR ECTOPIC PREGNANCY         Current Outpatient Prescriptions:     ergocalciferol (VITAMIN D2) 50,000 units, TAKE ONE CAPSULE BY MOUTH WEEKLY FOR 10 WEEKS THEN 2000MG DAILY OTC, Disp: , Rfl: 0    FLUoxetine (PROzac) 10 mg capsule, Take 1 capsule (10 mg total) by mouth daily, Disp: 30 capsule, Rfl: 2  Allergies   Allergen Reactions    Pollen Extract      Vitals:    01/24/19 0907   BP: 90/72   BP Location: Left arm   Patient Position: Sitting   Cuff Size: Standard   Pulse: 84   Weight: 56 7 kg (124 lb 14 4 oz)   Height: 5' 1" (1 549 m)       Labs:  Lab Results   Component Value Date     11/27/2014    K 4 0 12/07/2018    K 4 2 11/27/2014    CL 107 12/07/2018     11/27/2014    CO2 23 12/07/2018    CO2 19 (L) 11/27/2014    BUN 11 12/07/2018    BUN 7 11/27/2014    CREATININE 0 68 12/07/2018    CREATININE 0 45 (L) 11/27/2014    GLUCOSE 75 11/27/2014    CALCIUM 8 4 12/07/2018    CALCIUM 8 1 (L) 11/27/2014     Lab Results   Component Value Date    TROPONINI <0 02 11/30/2018     Lab Results   Component Value Date    WBC 8 03 12/07/2018    WBC 11 2 (H) 01/26/2016    HGB 13 2 12/07/2018    HGB 13 6 01/26/2016    HCT 38 5 12/07/2018    HCT 41 3 01/26/2016    MCV 88 12/07/2018    MCV 91 2 01/26/2016     12/07/2018     01/26/2016     No results found for: CHOL, TRIG, HDL, LDLDIRECT  Imaging: No results found  Holter   Average heart rate at night 65-79  Average HR during day   Circadian rhythm is retained   This is not suggestive of inappropriate sinus tachycardia              Bedside tilt done  Stand up tilt    Meets both criteria : HR increased by 61 (>40)  within 10 min of standing , from 81 to 142 + HR >120  BP stable   Symptoms reproduced   This is suggestive of POTS          Review of Systems:  Review of Systems   All other systems reviewed and are negative  as described in my history of present illness        Physical Exam:  Physical Exam   Constitutional: She is oriented to person, place, and time  She appears well-developed and well-nourished  No distress  Not in any distress at the current time   HENT:   Head: Normocephalic and atraumatic  Right Ear: External ear normal    Left Ear: External ear normal    Nose: Nose normal    Mouth/Throat: Uvula is midline and mucous membranes are normal    Eyes: Pupils are equal, round, and reactive to light  Conjunctivae, EOM and lids are normal  No scleral icterus  No pallor  No cyanosis  No icterus   Neck: Trachea normal and normal range of motion  Neck supple  No JVD present  Carotid bruit is not present  No thyromegaly present     No jugular lymphadenopathy   Cardiovascular: Normal rate, regular rhythm, S1 normal, S2 normal, normal heart sounds, intact distal pulses and normal pulses  PMI is not displaced  Exam reveals no gallop, no S3, no S4 and no friction rub  No murmur heard  Pulmonary/Chest: Effort normal and breath sounds normal  No accessory muscle usage  No respiratory distress  She has no decreased breath sounds  She has no wheezes  She has no rhonchi  She has no rales  She exhibits no tenderness  Abdominal: Soft  Normal appearance and bowel sounds are normal  She exhibits no distension and no mass  There is no splenomegaly or hepatomegaly  There is no tenderness  Musculoskeletal: Normal range of motion  She exhibits no edema, tenderness or deformity  Lymphadenopathy:     She has no cervical adenopathy  Neurological: She is alert and oriented to person, place, and time  Facial symmetry is retained  Extraocular movements are retained  Head neck tongue and palate movement are retained and symmetric   Skin: Skin is intact  No abrasion, no lesion and no rash noted  No erythema  Nails show no clubbing  Psychiatric: She has a normal mood and affect  Her speech is normal and behavior is normal  Thought content normal                Discussion/Summary:    1   Postural orthostatic tachycardia syndrome    Patient does have symptoms involving multiple systems:  -Palpitations, lightheadedness on postural changes  -Headaches, Inability to concentrate at times  -Fatigue and exertional intolerance at times  -Bloating sensation      Important relevant laboratory parameters:  CBCD- hb -12 1  TSH- 0 61 in Nov 2018  Holter - not s/o IST  Echo - normal as per outside report   TILT - suggestive of POTS        Considering her multisystem symptoms, laboratory parameters - suspected diagnosis is  POTS    The following are my detailed recommendations:  Exercise is the most important component of therapy and has a class IIa recommendation for POTS  therapy    1)  Water intake  Oral 2 1/2 to 3 L a day  This is close to 90 ounce  Is recommended measure      Intravenous saline should only be used as emergency step to prevent hospital admissions  Done in the emergency room, up to a liter to 1 liters over 1 hour  Symptomatic improvement can last several hours to days  Should be used only as rescue therapy in a clinically decompensated patient  Long-term infusion is never recommended      2) Salt intake  upto 10 grams can be used in appropriate patient   Considering her age, risk of hypertension recommend use Gatorade as half her water intake       3) Pressure stockings  Bilateral  Knee high, thigh high or waist high  10 mm, 20 mm, 30 mm, 40 mm Hg  Helps with venous return        4) Exercise -  Lester protocol  Set up appointment with Ms Faina Vogt / Ms Ron Chapman  This has both aerobic and muscle training component  Aerobic- will increase vagal tone and control of heart rate  Muscle training of legs- will help with venous return   Will request monthly evaluation of level of exercise tolerance       5) Medications to avoid  Numerous anti depressants and ADHD medications that inhibit NE transporter  TCA  SNRI - duloxetine, venlafaxine, milnacipram  NRI - atomoxetine, reboxetine  OCP containing drosperinine  These can provoke POTS in susceptible healthy volunteers            To be decided later     6) beta blockers  after exercise has been started, if patient still symptomatic with palpitations  Consider low-dose nonselective beta blocker  Propranolol 10 mg up to 3 times daily  Do not increase dose as it may worsen asthma  Also with diabetes should avoid any high dose of beta blocker  Avoid using selective beta blockers like metoprolol        7) Fludrocortisone  This will help with water retention  The farm keys[place]wn Minor affect lasts only 1-2 days  Effectiveness not really tested with randomized clinical trial      8)Midodrine  This is an alpha agonist, constriction of veins and arteries  It increases venous return and reduces orthostatic tachycardia  Effect is less than intravenous line  Rapid onset but only brief effect  It needs to be taken 3 times daily  Can be administered only during a time to avoid supine hypertension      9) Pyridostigmine  Is a peripheral acetylcholine esterase inhibitor  Can be used as an additive to other medication  Has significant side effects of diarrhea, abdominal pain, nausea, increased urinary urgency and frequency      10) Ivabradine  About 60 percent the patient's of symptomatic improvement in open-label  study       11) other medications-central sympathetic light occasions  Clonidine, methyldopa  Both agents can cause drowsiness, fatigue, worsening mental clouding      12) Modafinil  Has been considered for fatigue and cognitive dysfunction  It worsen symptom of tachycardia                Summary of my recommendations   Water intake / addition of Gatorade  Compression stocking  Lester protocol exercise   Patient wants to wait before considering propranolol  Other medications should not be used at current time until the first three steps instituted      A total of 100 min spent in case  Discussing care with patient

## 2019-01-24 NOTE — LETTER
January 24, 2019     Yee Vitale MD  [address]  [facility]  [place] [address]    Patient: Amaris Luna   YOB: 1985   Date of Visit: 1/24/2019       Dear Dr Diaz Lawrence: Thank you for referring Rachid Haskins to me for evaluation  Below are my notes for this consultation  If you have questions, please do not hesitate to call me  I look forward to following your patient along with you  Sincerely,        Aamir Cornejo MD        CC: Kandy Salas, Devin Henderson MD  1/24/2019  4:31 PM  Sign at close encounter                                             Cardiology Consultation     Amaris Luna  [id number]  1985  [facility] [place] [address]    1   POTS (postural orthostatic tachycardia syndrome)  POCT ECG    Compression Stocking       History of present illness    The patient has been referred to me by Dr Joon Morelos for management of POTS    Symptoms started about 10 years and have slowly worsened over time   She had a PVC ablation in 2014  She has 3 children aged 15, 3 and 2  She also has a history of Lyme's disease and has received doxycycline for 4 weeks for the same     Patient does have symptoms involving multiple systems:  -Palpitations, lightheadedness on postural changes  -Headaches, Inability to concentrate at times  -Fatigue and exertional intolerance at times  -Bloating sensation    The patient is not complaining of anginal like chest pain or chest pressure  There is no worsening orthopnea, paroxysmal nocturnal dyspnea  There is no leg swelling    Patient does get palpitation when it happens  There is history of presyncope and one of syncope  There is history of transient  lightheadedness  When patient has these palpitations, it is associated with exertional intolerance      There is no history of snoring at night  There is some history of morning fatigability  There is occasional history of daytime sleepiness      She gets uncomfortable with hot shower and heavy meals         Patient Active Problem List   Diagnosis    Inappropriate sinus tachycardia - Atypical chest pain    Impaired left ventricular function    Moderate mitral valve regurgitation    Vitamin D deficiency    Migraines     Past Medical History:   Diagnosis Date    Acute Lyme disease     Endometriosis     Endometriosis     Mitral valve regurgitation     MVP (mitral valve prolapse)     PVC (premature ventricular contraction)     SVT (supraventricular tachycardia) (HCC)      Social History     Social History    Marital status: /Civil Union     Spouse name: N/A    Number of children: N/A    Years of education: N/A     Occupational History    Not on file  Social History Main Topics    Smoking status: Former Smoker     Quit date: 8/30/2013    Smokeless tobacco: Never Used    Alcohol use Yes      Comment: rare    Drug use: No    Sexual activity: Yes     Partners: Male     Other Topics Concern    Not on file     Social History Narrative    No narrative on file      No family history on file    Past Surgical History:   Procedure Laterality Date    ABLATION OF DYSRHYTHMIC FOCUS      DIAGNOSTIC LAPAROSCOPY      for endometriosis    LAPAROSCOPY FOR ECTOPIC PREGNANCY         Current Outpatient Prescriptions:     ergocalciferol (VITAMIN D2) 50,000 units, TAKE ONE CAPSULE BY MOUTH WEEKLY FOR 10 WEEKS THEN 2000MG DAILY OTC, Disp: , Rfl: 0    FLUoxetine (PROzac) 10 mg capsule, Take 1 capsule (10 mg total) by mouth daily, Disp: 30 capsule, Rfl: 2  Allergies   Allergen Reactions    Pollen Extract      Vitals:    01/24/19 0907   BP: 90/72   BP Location: Left arm   Patient Position: Sitting   Cuff Size: Standard   Pulse: 84   Weight: 56 7 kg (124 lb 14 4 oz)   Height: 5' 1" (1 549 m)       Labs:  Lab Results   Component Value Date     11/27/2014    K 4 0 12/07/2018    K 4 2 11/27/2014    CL 107 12/07/2018     11/27/2014    CO2 23 12/07/2018    CO2 19 (L) 11/27/2014    BUN 11 12/07/2018    BUN 7 11/27/2014    CREATININE 0 68 12/07/2018    CREATININE 0 45 (L) 11/27/2014    GLUCOSE 75 11/27/2014    CALCIUM 8 4 12/07/2018    CALCIUM 8 1 (L) 11/27/2014     Lab Results   Component Value Date    TROPONINI <0 02 11/30/2018     Lab Results   Component Value Date    WBC 8 03 12/07/2018    WBC 11 2 (H) 01/26/2016    HGB 13 2 12/07/2018    HGB 13 6 01/26/2016    HCT 38 5 12/07/2018    HCT 41 3 01/26/2016    MCV 88 12/07/2018    MCV 91 2 01/26/2016     12/07/2018     01/26/2016     No results found for: CHOL, TRIG, HDL, LDLDIRECT  Imaging: No results found  Holter   Average heart rate at night 65-79  Average HR during day   Circadian rhythm is retained   This is not suggestive of inappropriate sinus tachycardia              Bedside tilt done  Stand up tilt    Meets both criteria : HR increased by 61 (>40)  within 10 min of standing , from 81 to 142 + HR >120  BP stable   Symptoms reproduced   This is suggestive of POTS          Review of Systems:  Review of Systems   All other systems reviewed and are negative  as described in my history of present illness        Physical Exam:  Physical Exam   Constitutional: She is oriented to person, place, and time  She appears well-developed and well-nourished  No distress  Not in any distress at the current time   HENT:   Head: Normocephalic and atraumatic  Right Ear: External ear normal    Left Ear: External ear normal    Nose: Nose normal    Mouth/Throat: Uvula is midline and mucous membranes are normal    Eyes: Pupils are equal, round, and reactive to light  Conjunctivae, EOM and lids are normal  No scleral icterus  No pallor  No cyanosis  No icterus   Neck: Trachea normal and normal range of motion  Neck supple  No JVD present  Carotid bruit is not present  No thyromegaly present     No jugular lymphadenopathy   Cardiovascular: Normal rate, regular rhythm, S1 normal, S2 normal, normal heart sounds, intact distal pulses and normal pulses  PMI is not displaced  Exam reveals no gallop, no S3, no S4 and no friction rub  No murmur heard  Pulmonary/Chest: Effort normal and breath sounds normal  No accessory muscle usage  No respiratory distress  She has no decreased breath sounds  She has no wheezes  She has no rhonchi  She has no rales  She exhibits no tenderness  Abdominal: Soft  Normal appearance and bowel sounds are normal  She exhibits no distension and no mass  There is no splenomegaly or hepatomegaly  There is no tenderness  Musculoskeletal: Normal range of motion  She exhibits no edema, tenderness or deformity  Lymphadenopathy:     She has no cervical adenopathy  Neurological: She is alert and oriented to person, place, and time  Facial symmetry is retained  Extraocular movements are retained  Head neck tongue and palate movement are retained and symmetric   Skin: Skin is intact  No abrasion, no lesion and no rash noted  No erythema  Nails show no clubbing  Psychiatric: She has a normal mood and affect  Her speech is normal and behavior is normal  Thought content normal                Discussion/Summary:    1   Postural orthostatic tachycardia syndrome    Patient does have symptoms involving multiple systems:  -Palpitations, lightheadedness on postural changes  -Headaches, Inability to concentrate at times  -Fatigue and exertional intolerance at times  -Bloating sensation      Important relevant laboratory parameters:  CBCD- hb -12 1  TSH- 0 61 in Nov 2018  Holter - not s/o IST  Echo - normal as per outside report   TILT - suggestive of POTS        Considering her multisystem symptoms, laboratory parameters - suspected diagnosis is  POTS    The following are my detailed recommendations:  Exercise is the most important component of therapy and has a class IIa recommendation for POTS  therapy    1)  Water intake  Oral 2 1/2 to 3 L a day  This is close to 90 ounce  Is recommended measure      Intravenous saline should only be used as emergency step to prevent hospital admissions  Done in the emergency room, up to a liter to 1 liters over 1 hour  Symptomatic improvement can last several hours to days  Should be used only as rescue therapy in a clinically decompensated patient  Long-term infusion is never recommended      2) Salt intake  upto 10 grams can be used in appropriate patient   Considering her age, risk of hypertension recommend use Gatorade as half her water intake       3) Pressure stockings  Bilateral  Knee high, thigh high or waist high  10 mm, 20 mm, 30 mm, 40 mm Hg  Helps with venous return        4) Exercise -  Lester protocol  Set up appointment with Ms Faina Vogt / Ms Ron Chapman  This has both aerobic and muscle training component  Aerobic- will increase vagal tone and control of heart rate  Muscle training of legs- will help with venous return   Will request monthly evaluation of level of exercise tolerance       5) Medications to avoid  Numerous anti depressants and ADHD medications that inhibit NE transporter  TCA  SNRI - duloxetine, venlafaxine, milnacipram  NRI - atomoxetine, reboxetine  OCP containing drosperinine  These can provoke POTS in susceptible healthy volunteers            To be decided later     6) beta blockers  after exercise has been started, if patient still symptomatic with palpitations  Consider low-dose nonselective beta blocker  Propranolol 10 mg up to 3 times daily  Do not increase dose as it may worsen asthma  Also with diabetes should avoid any high dose of beta blocker  Avoid using selective beta blockers like metoprolol        7) Fludrocortisone  This will help with water retention  The farm keyshawn Minor affect lasts only 1-2 days  Effectiveness not really tested with randomized clinical trial      8)Midodrine  This is an alpha agonist, constriction of veins and arteries  It increases venous return and reduces orthostatic tachycardia  Effect is less than intravenous line  Rapid onset but only brief effect  It needs to be taken 3 times daily  Can be administered only during a time to avoid supine hypertension      9) Pyridostigmine  Is a peripheral acetylcholine esterase inhibitor  Can be used as an additive to other medication  Has significant side effects of diarrhea, abdominal pain, nausea, increased urinary urgency and frequency      10) Ivabradine  About 60 percent the patient's of symptomatic improvement in open-label  study       11) other medications-central sympathetic light occasions  Clonidine, methyldopa  Both agents can cause drowsiness, fatigue, worsening mental clouding      12) Modafinil  Has been considered for fatigue and cognitive dysfunction  It worsen symptom of tachycardia                Summary of my recommendations   Water intake / addition of Gatorade  Compression stocking  Lester protocol exercise   Patient wants to wait before considering propranolol  Other medications should not be used at current time until the first three steps instituted      A total of 100 min spent in case  Discussing care with patient

## 2019-03-18 NOTE — PROGRESS NOTES
[facility] [facility]  PATIENT:  Nataliya Kitchen  MRN:  [id number]  :  1985  DATE OF SERVICE:  3/19/2019      Assessment/Plan:      Problem List Items Addressed This Visit        Cardiovascular and Mediastinum    Migraine with aura and without status migrainosus, not intractable - Primary    Relevant Medications    rizatriptan (MAXALT) 10 MG tablet    dexamethasone (DECADRON) 1 mg tablet    Other Relevant Orders    Vitamin B12    MRI brain w wo mra head wo mra neck w wo    BUN    Creatinine, serum    RESOLVED: Migraines    Relevant Medications    rizatriptan (MAXALT) 10 MG tablet    dexamethasone (DECADRON) 1 mg tablet    Other Relevant Orders    Vitamin B12    MRI brain w wo mra head wo mra neck w wo    BUN    Creatinine, serum    Vitamin B12    MRI brain w wo mra head wo mra neck w wo    BUN    Creatinine, serum          Migraine headaches with and without aura  Preventive therapy for headaches:   - none needed at this time  Abortive therapy for headaches:   - at the onset of migraine headache patient is to take Maxalt 10 mg may repeat in 2 hr  If this fails patient is to take Decadron 0 5 mg  Call if this fails as well for possible olanzapine or Depakote  Low vitamin-D:  Continue taking 5000 international units on daily basis after she has completed her 50,000 international units  Headache management instructions  - When patient has a moderate to severe headache, they should seek rest, initiate relaxation and apply cold compresses to the head  - Maintain regular sleep schedule  Adults need at least 7-8 hours of uninterrupted a night  - Limit over the counter medications such as Tylenol, Ibuprofen, Aleve, Excedrin  (No more than 3 times a week)  - Maintain headache diary  We discussed an THAI for a smart phone is "Migraine catarina"  - Limit caffeine to 1-2 cups 8 to 16 oz a day or less  - Avoid dietary trigger  (aged cheese, peanuts, MSG, aspartame and nitrates)    - Patient is to have regular frequent meals to prevent headache onset  - Please drink at least 64 ounces of water a day to help remain hydrated  Please call with any questions or concerns  Office number is [phone]       History of Present Illness: We had the pleasure of evaluating Jeni Daley in neurological consultation today for headaches  As you know,  she is a 35 y o  right handed  female  She is a stay mother and has 3 children  She is here today for evaluation of her headaches  POTS:   - Dec of 2018  She was diagnosed by Dr Elaine Guan    - Ablation in 2014 for PVC  - She was seen by her cardiologist and put on propanolol  She did try the metoprolol 1st but she noted that her BP really dropped with this  She had severe headaches with this as well  Thus she ended up having Ablation for PVC  - She was then put on a low dose of propranolol  She noted with this she had pressure sensation and Palpitation with this and she felt Propanolol made her symptoms worse and headaches started again with this  She had no depth perception with this either  In December of 2018 she was in the hospital for 10 days and during this hospitalization she was given the diagnosis of POTS  Any family history of migraines? No  Any family history of aneurysms? No    Migraine headaches:     What is your current pain level - 0/10    Headaches started at what age? 4 years ago they became migraine  Did have headache in the past but not like these  How often do the headaches occur? May of 2018 - 5 events  July: 1 with pulsation her eye  September:  3  November:  3  December:  Was in the hospital for 10 days for headaches    What time of the day do the headaches start?  Lunch time but could be anytime of the day    How long do the headaches last? Few hours to 3 day    Are you ever headache free? yes    Aura/warning and how long does it last -   2014 she had an event with dizziness, problem with depth perception, spots in her vision, numbness and tingling and then the next day she will have a headaches  She was also confused and was asking question that just did not make sense  Dec 2018: she felt her heart was beating in her eyes  Describe your usual headache - Throbbing,  Pressure    Where is your headache located? Left frontal and apex of her head    What is the intensity of pain? 9/10    Associated symptoms:   - Decrease of appetite, nausea  - Photophobia, phonophobia, sensitivity to smell   - Problem with concentration  - Blurred vision  - Tinnitus  - light-headed or dizzy, stiff or sore neck,   - prefer to be alone and in a dark room, unable to work    Number of days missed per month because of headaches:  Work (or school) days: she is not able to function for 2-3 days with these symptoms  Social or Family activities: yes    Headache are worse if the patient: cough  Headache triggers: not sure  What time of the year do headaches occur more frequently? none    Have you seen someone else for headaches or pain? No  Have you had trigger point injection performed and how often? No  Have you had Botox injection performed and how often? No   Have you had epidural injections or transforaminal injections performed? No    What medications do you take or have you taken for your headaches? PREVENTATIVE:  - vitamin-D  - propanolol  - fluoxetine  - midodrine  ABORTIVE:  - Toradol, naproxen, tramadol, Tylenol, Motrin  - Zofran    Alternative therapies used in the past for headaches? chiropractic care and message  Have you used CBD or THC for your headaches and how often? No  Are you current pregnant or planning on getting pregnant? No but would like to have one more child  Have you ever had any Brain imaging? no     Recent laboratory data was reviewed  Medications and allergies were reviewed      Past Medical History:   Diagnosis Date    Acute Lyme disease     Endometriosis     Endometriosis     Mitral valve regurgitation     MVP (mitral valve prolapse)     POTS (postural orthostatic tachycardia syndrome)     PVC (premature ventricular contraction)     SV[facility]supraventricular tachycardia) [facility])        Patient Active Problem List   Diagnosis    Inappropriate sinus tachycardia - Atypical chest pain    Impaired left ventricular function    Moderate mitral valve regurgitation    Vitamin D deficiency    Migraine with aura and without status migrainosus, not intractable       Medications:      Current Outpatient Medications   Medication Sig Dispense Refill    ergocalciferol (VITAMIN D2) 50,000 units TAKE ONE CAPSULE BY MOUTH WEEKLY FOR 10 WEEKS THEN 2000MG DAILY OTC  0    FLUoxetine (PROzac) 10 mg capsule Take 1 capsule (10 mg total) by mouth daily 30 capsule 2    PREVIDENT 5000 BOOSTER PLUS 1 1 % PSTE Take 1 application by mouth daily Use as directed  3    dexamethasone (DECADRON) 1 mg tablet At onset of a severe headache 5 tablet 0    rizatriptan (MAXALT) 10 MG tablet Take 1 tablet (10 mg total) by mouth once as needed for migraine May repeat every 2 hours if needed, max 30mg/24 hours 12 tablet 3     No current facility-administered medications for this visit  Allergies:       Allergies   Allergen Reactions    Pollen Extract        Family History:     Family History   Problem Relation Age of Onset    Hypertension Father     Cancer Paternal Grandfather     Sudden death Paternal Grandfather        Social History:     Social History     Socioeconomic History    Marital status: /Civil Union     Spouse name: Not on file    Number of children: 3    Years of education: Not on file    Highest education level: Not on file   Occupational History    Not on file   Social Needs    Financial resource strain: Not on file    Food insecurity:     Worry: Not on file     Inability: Not on file    Transportation needs:     Medical: Not on file     Non-medical: Not on file   Tobacco Use    Smoking status: Former Smoker     Last attempt to quit: 2013     Years since quittin 5    Smokeless tobacco: Never Used   Substance and Sexual Activity    Alcohol use: Yes     Comment: Socially    Drug use: No    Sexual activity: Yes     Partners: Male   Lifestyle    Physical activity:     Days per week: Not on file     Minutes per session: Not on file    Stress: Not on file   Relationships    Social connections:     Talks on phone: Not on file     Gets together: Not on file     Attends [employer] witness service: Not on file     Active member of club or organization: Not on file     Attends meetings of clubs or organizations: Not on file     Relationship status: Not on file    Intimate partner violence:     Fear of current or ex partner: Not on file     Emotionally abused: Not on file     Physically abused: Not on file     Forced sexual activity: Not on file   Other Topics Concern    Not on file   Social History Narrative    Not on file         Objective:   Physical Exam:                                                                   Vitals:               /80 (BP Location: Left arm, Patient Position: Sitting, Cuff Size: Standard)   Pulse (!) 108   Ht 5' 1" (1 549 m)   Wt 59 4 kg (130 lb 14 4 oz)   BMI 24 73 kg/m²   BP Readings from Last 3 Encounters:   19 104/80   19 90/72   18 122/80     Pulse Readings from Last 3 Encounters:   19 (!) 108   19 84   18 60              CONSTITUTIONAL: Well developed, well nourished, well groomed  No dysmorphic features  Eyes:  PERRLA, EOM normal      Neck:  Normal ROM, neck supple  HEENT:  Normocephalic atraumatic  No meningismus  Oropharynx is clear and moist  No oral mucosal lesions  Chest:  Respirations regular and unlabored  Cardiovascular:  Distal extremities warm without palpable edema or tenderness, no observed significant swelling  Musculoskeletal:  Full range of motion      Skin:  warm and dry   Psychiatric:  Normal behavior and appropriate affect        Neurological Examination:   Mental status/cognitive function: Orientated to time, place and person  Cranial Nerves: 2 to 12 intact  Motor Exam:  5/5 upper and lower extremity  Sensory: grossly intact light touch in all extremities  Reflexes: 2/4 throughout  Coordination: Finger nose finger intact bilaterally, no tremor noted  Gait: steady casual and tandem gait  Romberg Negative  Review of Systems:   Review of Systems   Constitutional: Positive for fatigue  HENT: Negative  Eyes: Positive for photophobia and pain  Blurred Vision    Respiratory: Negative  Cardiovascular: Negative  Gastrointestinal: Positive for nausea  Endocrine: Positive for cold intolerance and heat intolerance  Genitourinary: Negative  Musculoskeletal: Negative  Skin: Negative  Allergic/Immunologic: Negative  Neurological: Positive for dizziness, light-headedness and headaches  Tingling    Hematological: Bruises/bleeds easily  Psychiatric/Behavioral: Positive for decreased concentration and sleep disturbance (Trouble falling and staying asleep )  I personally reviewed and updated the ROS that was entered by the medical assistant       I have spent 60 minutes with Patient  today in which greater than 50% of this time was spent in counseling/coordination of care regarding Risks and benefits of tx options, Intructions for management, Patient and family education, Importance of tx compliance, Risk factor reductions, Impressions and plan of care as above        Author:  Elvin Kocher, MD 3/19/2019 4:16 PM

## 2019-03-19 ENCOUNTER — OFFICE VISIT (OUTPATIENT)
Dept: NEUROLOGY | Facility: CLINIC | Age: 34
End: 2019-03-19
Payer: COMMERCIAL

## 2019-03-19 VITALS
HEART RATE: 108 BPM | WEIGHT: 130.9 LBS | SYSTOLIC BLOOD PRESSURE: 104 MMHG | HEIGHT: 61 IN | DIASTOLIC BLOOD PRESSURE: 80 MMHG | BODY MASS INDEX: 24.72 KG/M2

## 2019-03-19 DIAGNOSIS — G43.101 MIGRAINE WITH AURA AND WITH STATUS MIGRAINOSUS, NOT INTRACTABLE: ICD-10-CM

## 2019-03-19 DIAGNOSIS — G43.109 MIGRAINE WITH AURA AND WITHOUT STATUS MIGRAINOSUS, NOT INTRACTABLE: Primary | ICD-10-CM

## 2019-03-19 PROBLEM — G43.909 MIGRAINES: Status: RESOLVED | Noted: 2018-12-04 | Resolved: 2019-03-19

## 2019-03-19 PROCEDURE — 99245 OFF/OP CONSLTJ NEW/EST HI 55: CPT | Performed by: PSYCHIATRY & NEUROLOGY

## 2019-03-19 RX ORDER — DEXAMETHASONE 1 MG
TABLET ORAL
Qty: 5 TABLET | Refills: 0 | Status: SHIPPED | OUTPATIENT
Start: 2019-03-19 | End: 2020-02-06 | Stop reason: ALTCHOICE

## 2019-03-19 RX ORDER — RIZATRIPTAN BENZOATE 10 MG/1
10 TABLET ORAL ONCE AS NEEDED
Qty: 12 TABLET | Refills: 3 | Status: SHIPPED | OUTPATIENT
Start: 2019-03-19

## 2019-03-19 RX ORDER — SODIUM FLUORIDE 6 MG/ML
1 PASTE, DENTIFRICE DENTAL DAILY
Refills: 3 | COMMUNITY
Start: 2019-03-14

## 2019-03-19 NOTE — PATIENT INSTRUCTIONS
Migraine headaches with and without aura  Preventive therapy for headaches:   - none needed at this time  Abortive therapy for headaches:   - at the onset of migraine headache patient is to take Maxalt 10 mg may repeat in 2 hr  If this fails patients take Decadron 0 5 mg  Call if this fails as well for possible olanzapine or Depakote  Low vitamin-D:  Continue taking 5000 international units on daily basis after she has completed her 50,000 international units  Headache management instructions  - When patient has a moderate to severe headache, they should seek rest, initiate relaxation and apply cold compresses to the head  - Maintain regular sleep schedule  Adults need at least 7-8 hours of uninterrupted a night  - Limit over the counter medications such as Tylenol, Ibuprofen, Aleve, Excedrin  (No more than 3 times a week)  - Maintain headache diary  We discussed an THAI for a smart phone is "Migraine catarina"  - Limit caffeine to 1-2 cups 8 to 16 oz a day or less  - Avoid dietary trigger  (aged cheese, peanuts, MSG, aspartame and nitrates)  - Patient is to have regular frequent meals to prevent headache onset  - Please drink at least 64 ounces of water a day to help remain hydrated  Please call with any questions or concerns   Office number is [phone]

## 2019-04-09 ENCOUNTER — APPOINTMENT (OUTPATIENT)
Dept: LAB | Age: 34
End: 2019-04-09
Payer: COMMERCIAL

## 2019-04-09 ENCOUNTER — TELEPHONE (OUTPATIENT)
Dept: NEUROLOGY | Facility: CLINIC | Age: 34
End: 2019-04-09

## 2019-04-09 DIAGNOSIS — G43.101 MIGRAINE WITH AURA AND WITH STATUS MIGRAINOSUS, NOT INTRACTABLE: ICD-10-CM

## 2019-04-09 DIAGNOSIS — G43.109 MIGRAINE WITH AURA AND WITHOUT STATUS MIGRAINOSUS, NOT INTRACTABLE: ICD-10-CM

## 2019-04-09 LAB
BUN SERPL-MCNC: 10 MG/DL (ref 5–25)
CREAT SERPL-MCNC: 0.75 MG/DL (ref 0.6–1.3)
GFR SERPL CREATININE-BSD FRML MDRD: 105 ML/MIN/1.73SQ M
VIT B12 SERPL-MCNC: 391 PG/ML (ref 100–900)

## 2019-04-09 PROCEDURE — 84520 ASSAY OF UREA NITROGEN: CPT

## 2019-04-09 PROCEDURE — 82565 ASSAY OF CREATININE: CPT

## 2019-04-09 PROCEDURE — 36415 COLL VENOUS BLD VENIPUNCTURE: CPT

## 2019-04-09 PROCEDURE — 82607 VITAMIN B-12: CPT

## 2019-04-12 ENCOUNTER — HOSPITAL ENCOUNTER (OUTPATIENT)
Dept: MRI IMAGING | Facility: HOSPITAL | Age: 34
Discharge: HOME | End: 2019-04-12
Attending: PSYCHIATRY & NEUROLOGY
Payer: COMMERCIAL

## 2019-04-12 DIAGNOSIS — G43.101 MIGRAINE WITH AURA AND WITH STATUS MIGRAINOSUS, NOT INTRACTABLE: ICD-10-CM

## 2019-04-12 DIAGNOSIS — G43.109 MIGRAINE WITH AURA AND WITHOUT STATUS MIGRAINOSUS, NOT INTRACTABLE: ICD-10-CM

## 2019-04-12 PROCEDURE — 70553 MRI BRAIN STEM W/O & W/DYE: CPT

## 2019-04-12 PROCEDURE — 70549 MR ANGIOGRAPH NECK W/O&W/DYE: CPT

## 2019-04-12 PROCEDURE — 70544 MR ANGIOGRAPHY HEAD W/O DYE: CPT

## 2019-04-12 PROCEDURE — A9585 GADOBUTROL INJECTION: HCPCS | Performed by: PSYCHIATRY & NEUROLOGY

## 2019-04-12 RX ADMIN — GADOBUTROL 6 ML: 604.72 INJECTION INTRAVENOUS at 12:51

## 2019-05-10 DIAGNOSIS — F32.81 PMDD (PREMENSTRUAL DYSPHORIC DISORDER): ICD-10-CM

## 2019-05-12 RX ORDER — FLUOXETINE 10 MG/1
CAPSULE ORAL
Qty: 30 CAPSULE | Refills: 0 | Status: SHIPPED | OUTPATIENT
Start: 2019-05-12 | End: 2019-09-02 | Stop reason: SDUPTHER

## 2019-05-31 ENCOUNTER — OFFICE VISIT (OUTPATIENT)
Dept: NEUROLOGY | Facility: CLINIC | Age: 34
End: 2019-05-31
Payer: COMMERCIAL

## 2019-05-31 VITALS
BODY MASS INDEX: 25.86 KG/M2 | WEIGHT: 137 LBS | SYSTOLIC BLOOD PRESSURE: 119 MMHG | HEIGHT: 61 IN | HEART RATE: 83 BPM | DIASTOLIC BLOOD PRESSURE: 75 MMHG

## 2019-05-31 DIAGNOSIS — G43.109 MIGRAINE WITH AURA AND WITHOUT STATUS MIGRAINOSUS, NOT INTRACTABLE: Primary | ICD-10-CM

## 2019-05-31 DIAGNOSIS — M79.7 FIBROMYALGIA: ICD-10-CM

## 2019-05-31 PROCEDURE — 99214 OFFICE O/P EST MOD 30 MIN: CPT | Performed by: PSYCHIATRY & NEUROLOGY

## 2019-05-31 RX ORDER — MULTIVIT-MIN/IRON/FOLIC ACID/K 18-600-40
1 CAPSULE ORAL DAILY
COMMUNITY
End: 2020-02-06 | Stop reason: ALTCHOICE

## 2019-05-31 RX ORDER — LANOLIN ALCOHOL/MO/W.PET/CERES
1000 CREAM (GRAM) TOPICAL DAILY
COMMUNITY
End: 2020-02-06 | Stop reason: ALTCHOICE

## 2019-05-31 RX ORDER — GABAPENTIN 100 MG/1
CAPSULE ORAL
Qty: 90 CAPSULE | Refills: 1 | Status: SHIPPED | OUTPATIENT
Start: 2019-05-31 | End: 2019-09-04 | Stop reason: SDUPTHER

## 2019-06-24 ENCOUNTER — TELEPHONE (OUTPATIENT)
Dept: NEUROLOGY | Facility: CLINIC | Age: 34
End: 2019-06-24

## 2019-09-02 DIAGNOSIS — F32.81 PMDD (PREMENSTRUAL DYSPHORIC DISORDER): ICD-10-CM

## 2019-09-02 RX ORDER — FLUOXETINE 10 MG/1
CAPSULE ORAL
Qty: 30 CAPSULE | Refills: 3 | Status: SHIPPED | OUTPATIENT
Start: 2019-09-02 | End: 2019-12-24

## 2019-09-04 ENCOUNTER — APPOINTMENT (OUTPATIENT)
Dept: LAB | Facility: CLINIC | Age: 34
End: 2019-09-04
Payer: COMMERCIAL

## 2019-09-04 ENCOUNTER — OFFICE VISIT (OUTPATIENT)
Dept: RHEUMATOLOGY | Facility: CLINIC | Age: 34
End: 2019-09-04
Payer: COMMERCIAL

## 2019-09-04 ENCOUNTER — TRANSCRIBE ORDERS (OUTPATIENT)
Dept: LAB | Facility: CLINIC | Age: 34
End: 2019-09-04

## 2019-09-04 VITALS
WEIGHT: 139 LBS | HEIGHT: 61 IN | SYSTOLIC BLOOD PRESSURE: 120 MMHG | BODY MASS INDEX: 26.24 KG/M2 | DIASTOLIC BLOOD PRESSURE: 78 MMHG

## 2019-09-04 DIAGNOSIS — R53.82 CHRONIC FATIGUE: Primary | ICD-10-CM

## 2019-09-04 DIAGNOSIS — M79.7 FIBROMYALGIA: ICD-10-CM

## 2019-09-04 DIAGNOSIS — G89.29 CHRONIC LOW BACK PAIN WITHOUT SCIATICA, UNSPECIFIED BACK PAIN LATERALITY: ICD-10-CM

## 2019-09-04 DIAGNOSIS — E55.9 VITAMIN D DEFICIENCY: ICD-10-CM

## 2019-09-04 DIAGNOSIS — M79.10 MYALGIA: ICD-10-CM

## 2019-09-04 DIAGNOSIS — M54.50 CHRONIC LOW BACK PAIN WITHOUT SCIATICA, UNSPECIFIED BACK PAIN LATERALITY: ICD-10-CM

## 2019-09-04 LAB
25(OH)D3 SERPL-MCNC: 18.5 NG/ML (ref 30–100)
CK SERPL-CCNC: 72 U/L (ref 26–192)
CORTIS SERPL-MCNC: 8.1 UG/DL
CRP SERPL QL: <3 MG/L
ERYTHROCYTE [SEDIMENTATION RATE] IN BLOOD: 3 MM/HOUR (ref 0–20)

## 2019-09-04 PROCEDURE — 82533 TOTAL CORTISOL: CPT | Performed by: INTERNAL MEDICINE

## 2019-09-04 PROCEDURE — 82550 ASSAY OF CK (CPK): CPT | Performed by: INTERNAL MEDICINE

## 2019-09-04 PROCEDURE — 36415 COLL VENOUS BLD VENIPUNCTURE: CPT | Performed by: INTERNAL MEDICINE

## 2019-09-04 PROCEDURE — 99244 OFF/OP CNSLTJ NEW/EST MOD 40: CPT | Performed by: INTERNAL MEDICINE

## 2019-09-04 PROCEDURE — 85652 RBC SED RATE AUTOMATED: CPT | Performed by: INTERNAL MEDICINE

## 2019-09-04 PROCEDURE — 86038 ANTINUCLEAR ANTIBODIES: CPT | Performed by: INTERNAL MEDICINE

## 2019-09-04 PROCEDURE — 86140 C-REACTIVE PROTEIN: CPT | Performed by: INTERNAL MEDICINE

## 2019-09-04 PROCEDURE — 82306 VITAMIN D 25 HYDROXY: CPT | Performed by: INTERNAL MEDICINE

## 2019-09-04 PROCEDURE — 82085 ASSAY OF ALDOLASE: CPT | Performed by: INTERNAL MEDICINE

## 2019-09-04 PROCEDURE — 86235 NUCLEAR ANTIGEN ANTIBODY: CPT | Performed by: INTERNAL MEDICINE

## 2019-09-04 RX ORDER — GABAPENTIN 100 MG/1
100 CAPSULE ORAL
Qty: 30 CAPSULE | Refills: 3 | Status: SHIPPED | OUTPATIENT
Start: 2019-09-04 | End: 2020-02-06

## 2019-09-04 NOTE — PROGRESS NOTES
Assessment and Plan: True Phoenix is a 35 y o  female who presents as a Rheumatology consult referred by her PCP Giovanny Henry MD and neurologist Dr Alphonso Martin for evaluation of possible autoimmune disease vs  Fibromyalgia  Patient's overall clinical picture does seem consistent with fibromyalgia, but to rule-out a possible underlying autoimmune disease, ordered an SHERIE  Also specifically ordered anti-SSA/SSB to rule-out Sjogren's syndrome, since patient has a history of a lot of dental work and an eye ulcer; also, her father has a history of small fiber neuropathy  Ordered a Vit  D level as well since low Vit  D can contribute to myalgias; this level returned low, so have prescribed for patient ergocalciferol 50,000 units po weekly  For her numbness/tingling symptoms and to help with sleep that might be related to fibromyalgia, have prescribed gabapentin 100mg po qhs; patient's PCP can titrate up and refill if helpful  Have also placed an Aqua therapy referral to help with patient's fibromyalgia symptoms  Since patient has a history of chronic low back pain and right SI joint tenderness on physical exam, have ordered SI joint x-rays to rule-out sacroiliitis  CK and aldolase ordered to work-up myalgias, but returned normal; inflammatory markers ESR, CRP also ordered and returned normal  Ordered cortisol level per patient's request, which returned normal  Will await the results of patient's SHERIE, anti-SSA/SSB, and SI joint x-rays, and call her to return to clinic if needed  Plan:  Diagnoses and all orders for this visit:    Chronic fatigue  -     Sedimentation rate, automated  -     C-reactive protein  -     Vitamin D 25 hydroxy  -     SHERIE Screen w/ Reflex to Titer/Pattern  -     Cortisol  -     SL Aquatic Therapy  -     Sjogren's Antibodies    Vitamin D deficiency  -     Vitamin D 25 hydroxy  -     ergocalciferol (ERGOCALCIFEROL) 09205 units capsule;  Take 1 capsule (50,000 Units total) by mouth once a week    Fibromyalgia  -     gabapentin (NEURONTIN) 100 mg capsule; Take 1 capsule (100 mg total) by mouth daily at bedtime    Myalgia  -     CK  -     Aldolase    Chronic low back pain without sciatica, unspecified back pain laterality  -     XR sacroiliac joints < 3 views; Future    Follow-up plan: Return to clinic as needed; will call with abnormal results      Chief Complaint  Chief Complaint   Patient presents with    Joint Pain     All Over, Primarily left hip, lower back, shoulders    Numbness     elbow radiating down to arms and hands, feet    Fatigue    Weakness - Generalized    Headache     HPI  Emi Mckeon is a 35 y o   female who presents as a Rheumatology consult referred by her PCP Josse Charles MD and neurologist Dr Cherylene Moynahan for evaluation of possible autoimmune disease vs  fibromyalgia  Patient states that several years ago, she was having PVCs, was hospitalized and underwent ablation  She was prescribed beta-blockers, and it made things worse  She was in the hospital in 12/2018, and was officially diagnosed with POTS by an electrophysiologist  She did physical therapy and continued exercises with a recumbent bike at home  She is not able to tolerate any medications for her POTS such as midodrine, so mainly is relying on exercises  Of note, she dislocated her elbows when she was young  Patient mainly complains of muscle soreness today  She has gained 25 pounds from Dec 2018 to March 2019, and is not able to lose it  Complains of widespread pain, feeling very tired, and sleeping problems  Even if she has a full night's sleep, she feels like she has been "hit by a truck" in the morning  Also complains of pins and needles sensation in her fingertips, electrical shooting pains from shoulders down arms, and tingling from elbows down  She was started on daily Vit  B12 2-3 months ago, but only takes it twice a week  She also is supposed to be taking daily Vit   D 2,000 units po daily, but does not remember to take it every day  Of note, she was treated for Lyme's disease in 2013; does not think she had any sequelae from it  Patient admits to having bad teeth, but does not particularly notice sicca symptoms  She gets left hip pain that is almost constant, sometimes pain is so severe that she will limp, not related to activity  Also complains of low back pain that is constant, and has mitral valve prolapse  She complains of feeling very sensitive to cold, bones hurt  Also complaining of significant hair loss, but not in patches  She denies any oral ulcers, rashes, h/o pericarditis or pleurisy  Has mitral valve proplapse  Sensitive to cold - hurts  Has been having significant hair loss  No oral ulcers, no rashes, no pleuritis/pericarditis  Admits to having an eye ulcer at one time  Has been having migraines with aura since 2013, is improving  Has had two first trimester miscarriages, no blood clots  Review of Systems  Review of Systems   Constitutional: Positive for fatigue  Negative for chills, fever and unexpected weight change  HENT: Negative for mouth sores and trouble swallowing  Eyes: Negative for pain and visual disturbance  Respiratory: Positive for shortness of breath  Negative for cough  Cardiovascular: Negative for chest pain and leg swelling  Gastrointestinal: Positive for abdominal pain, diarrhea and nausea  Negative for blood in stool and constipation  Endocrine: Positive for cold intolerance, heat intolerance and polydipsia  Genitourinary: Positive for frequency  Negative for hematuria  Musculoskeletal: Positive for arthralgias, back pain, myalgias and neck pain  Negative for joint swelling  Skin: Negative for rash  Persistent itching of right leg   Allergic/Immunologic: Positive for environmental allergies  Neurological: Positive for dizziness, weakness, numbness and headaches  Negative for tremors  Hematological: Negative for adenopathy  Bruises/bleeds easily  Psychiatric/Behavioral: Negative for sleep disturbance       Allergies  Allergies   Allergen Reactions    Pollen Extract      Home Medications    Current Outpatient Medications:     Cholecalciferol (VITAMIN D) 2000 units CAPS, Take 1 capsule by mouth daily, Disp: , Rfl:     cyanocobalamin (VITAMIN B-12) 1,000 mcg tablet, Take 1,000 mcg by mouth daily, Disp: , Rfl:     FLUoxetine (PROzac) 10 mg capsule, TAKE 1 CAPSULE BY MOUTH EVERY DAY, Disp: 30 capsule, Rfl: 3    PREVIDENT 5000 BOOSTER PLUS 1 1 % PSTE, Take 1 application by mouth daily Use as directed, Disp: , Rfl: 3    rizatriptan (MAXALT) 10 MG tablet, Take 1 tablet (10 mg total) by mouth once as needed for migraine May repeat every 2 hours if needed, max 30mg/24 hours, Disp: 12 tablet, Rfl: 3    dexamethasone (DECADRON) 1 mg tablet, At onset of a severe headache (Patient not taking: Reported on 2019), Disp: 5 tablet, Rfl: 0    ergocalciferol (ERGOCALCIFEROL) 12998 units capsule, Take 1 capsule (50,000 Units total) by mouth once a week, Disp: 12 capsule, Rfl: 1    gabapentin (NEURONTIN) 100 mg capsule, Take 1 capsule (100 mg total) by mouth daily at bedtime, Disp: 30 capsule, Rfl: 3    Prenatal Multivit-Min-Fe-FA (PRE-CORAL FORMULA) TABS, Take 1 tablet by mouth daily, Disp: , Rfl:     Past Medical History  Past Medical History:   Diagnosis Date    Acute Lyme disease     Endometriosis     Endometriosis     Mitral valve regurgitation     MVP (mitral valve prolapse)     POTS (postural orthostatic tachycardia syndrome)     PVC (premature ventricular contraction)     SV[facility]supraventricular tachycardia) [facility])      Past Surgical History   Past Surgical History:   Procedure Laterality Date    ABLATION OF DYSRHYTHMIC FOCUS      DIAGNOSTIC LAPAROSCOPY      for endometriosis    LAPAROSCOPY FOR ECTOPIC PREGNANCY      WISDOM TOOTH EXTRACTION         Family History    Family History   Problem Relation Age of Onset    Hypertension Father     Cancer Paternal Grandfather     Sudden death Paternal Grandfather     Arthritis Mother     Arthritis Paternal Uncle     Rheum arthritis Paternal Uncle     Lupus Cousin    Maternal grandmother - possible arthritis  Father - small fiber neuropathy  Three cousins and an uncle - fibromyalgia    Social History  Occupation: homemaker  Social History     Substance and Sexual Activity   Alcohol Use Yes    Frequency: Never    Drinks per session: 1 or 2    Binge frequency: Never    Comment: Socially/ Rarely     Social History     Substance and Sexual Activity   Drug Use No     Social History     Tobacco Use   Smoking Status Former Smoker    Last attempt to quit: 2013    Years since quittin 0   Smokeless Tobacco Never Used   has three children, had two first trimester miscarriages    Objective:  Vitals:    19 1118   BP: 120/78   BP Location: Right arm   Patient Position: Sitting   Cuff Size: Adult   Weight: 63 kg (139 lb)   Height: 5' 1" (1 549 m)       Physical Exam   Constitutional: She appears well-developed and well-nourished  No distress  HENT:   Head: Normocephalic and atraumatic  Mouth/Throat: Oropharynx is clear and moist and mucous membranes are normal    Eyes: Conjunctivae and EOM are normal  No scleral icterus  Neck: Neck supple  No spinous process tenderness and no muscular tenderness present  No thyromegaly present  Cardiovascular: Normal rate, regular rhythm, S1 normal and S2 normal  Exam reveals no friction rub  No murmur heard  Pulmonary/Chest: Effort normal and breath sounds normal  No respiratory distress  She has no wheezes  She has no rhonchi  She has no rales  Abdominal: Soft  She exhibits no distension  There is no hepatosplenomegaly  There is no tenderness  Musculoskeletal: She exhibits tenderness  R SI joint tenderness, L trochanteric bursa tenderness, bilateral upper trapezius tenderness;  Beighton score 6/9 - patient able to hyperextend her bilateral elbows, knees, and 5th fingers   Lymphadenopathy:     She has no cervical adenopathy  Neurological: She is alert  She has normal strength  No sensory deficit  Skin: Skin is warm and dry  No rash noted  Nails show no clubbing  Psychiatric: She has a normal mood and affect  Reviewed labs  Imaging:   No results found  Labs:   Office Visit on 09/04/2019   Component Date Value Ref Range Status    Sed Rate 09/04/2019 3  0 - 20 mm/hour Final    CRP 09/04/2019 <3 0  <3 0 mg/L Final    Vit D, 25-Hydroxy 09/04/2019 18 5* 30 0 - 100 0 ng/mL Final    Total CK 09/04/2019 72  26 - 192 U/L Final    Aldolase 09/04/2019 5 2  3 3 - 10 3 U/L Final    Cortisol, Random 09/04/2019 8 1  ug/dL Final   Appointment on 04/09/2019   Component Date Value Ref Range Status    Vitamin B-12 04/09/2019 391  100 - 900 pg/mL Final    BUN 04/09/2019 10  5 - 25 mg/dL Final    Creatinine 04/09/2019 0 75  0 60 - 1 30 mg/dL Final    Standardized to IDMS reference method    eGFR 04/09/2019 105  ml/min/1 73sq m Final   Annual Exam on 12/14/2018   Component Date Value Ref Range Status    Diagnosis: 12/14/2018 Comment   Final    Comment: NEGATIVE FOR INTRAEPITHELIAL LESION AND MALIGNANCY  THE CYTOLOGY PROCESSING WAS PERFORMED AT THE [facility] LOCATED AT  [place] [address], [address] [place], [place] [id number]   Specimen Adequacy 12/14/2018 Comment   Final    Comment: Satisfactory for evaluation  Endocervical and/or squamous metaplastic  cells (endocervical component) are present   Clinician Provided ICD10 12/14/2018 Comment   Final    [id number]    Performed by: 12/14/2018 Comment   Final    Chelsey Rivera, Cytotechnologist (ASCP)    SL AMB   12/14/2018   Final    Note: 12/14/2018 Comment   Final    Comment: The Pap smear is a screening test designed to aid in the detection of  premalignant and malignant conditions of the uterine cervix    It is not a  diagnostic procedure and should not be used as the sole means of detecting  cervical cancer  Both false-positive and false-negative reports do occur   Test Methodology: 12/14/2018 Comment   Final    Comment: This liquid based ThinPrep(R) pap test was screened with the  use of an image guided system   HPV, High Risk Only 12/14/2018 Negative  Negative Final    Comment: This high-risk HPV test detects thirteen high-risk types  (16/18/31/33/35/39/45/51/52/56/58/59/68) without differentiation   Chlamydia, Nuc  Acid Amp 12/14/2018 Negative  Negative Final    Gonococcus bY Nucleic Acid Amp 12/14/2018 Negative  Negative Final   Admission on 11/29/2018, Discharged on 12/07/2018   Component Date Value Ref Range Status    Sodium 11/29/2018 135* 136 - 145 mmol/L Final    Potassium 11/29/2018 4 1  3 5 - 5 3 mmol/L Final    Chloride 11/29/2018 105  100 - 108 mmol/L Final    CO2 11/29/2018 26  21 - 32 mmol/L Final    ANION GAP 11/29/2018 4  4 - 13 mmol/L Final    BUN 11/29/2018 7  5 - 25 mg/dL Final    Creatinine 11/29/2018 0 73  0 60 - 1 30 mg/dL Final    Standardized to IDMS reference method    Glucose 11/29/2018 84  65 - 140 mg/dL Final      If the patient is fasting, the ADA then defines impaired fasting glucose as > 100 mg/dL and diabetes as > or equal to 123 mg/dL  Specimen collection should occur prior to Sulfasalazine administration due to the potential for falsely depressed results  Specimen collection should occur prior to Sulfapyridine administration due to the potential for falsely elevated results   Calcium 11/29/2018 9 4  8 3 - 10 1 mg/dL Final    AST 11/29/2018 11  5 - 45 U/L Final      Specimen collection should occur prior to Sulfasalazine administration due to the potential for falsely depressed results   ALT 11/29/2018 20  12 - 78 U/L Final      Specimen collection should occur prior to Sulfasalazine and/or Sulfapyridine administration due to the potential for falsely depressed results       Alkaline Phosphatase 11/29/2018 55 46 - 116 U/L Final    Total Protein 11/29/2018 7 1  6 4 - 8 2 g/dL Final    Albumin 11/29/2018 3 8  3 5 - 5 0 g/dL Final    Total Bilirubin 11/29/2018 0 77  0 20 - 1 00 mg/dL Final    eGFR 11/29/2018 109  ml/min/1 73sq m Final    WBC 11/29/2018 8 16  4 31 - 10 16 Thousand/uL Final    RBC 11/29/2018 4 40  3 81 - 5 12 Million/uL Final    Hemoglobin 11/29/2018 12 9  11 5 - 15 4 g/dL Final    Hematocrit 11/29/2018 39 6  34 8 - 46 1 % Final    MCV 11/29/2018 90  82 - 98 fL Final    MCH 11/29/2018 29 3  26 8 - 34 3 pg Final    MCHC 11/29/2018 32 6  31 4 - 37 4 g/dL Final    RDW 11/29/2018 12 3  11 6 - 15 1 % Final    MPV 11/29/2018 10 9  8 9 - 12 7 fL Final    Platelets 60/17/0709 206  149 - 390 Thousands/uL Final    nRBC 11/29/2018 0  /100 WBCs Final    Neutrophils Relative 11/29/2018 61  43 - 75 % Final    Immat GRANS % 11/29/2018 0  0 - 2 % Final    Lymphocytes Relative 11/29/2018 32  14 - 44 % Final    Monocytes Relative 11/29/2018 6  4 - 12 % Final    Eosinophils Relative 11/29/2018 1  0 - 6 % Final    Basophils Relative 11/29/2018 0  0 - 1 % Final    Neutrophils Absolute 11/29/2018 4 86  1 85 - 7 62 Thousands/µL Final    Immature Grans Absolute 11/29/2018 0 03  0 00 - 0 20 Thousand/uL Final    Lymphocytes Absolute 11/29/2018 2 64  0 60 - 4 47 Thousands/µL Final    Monocytes Absolute 11/29/2018 0 51  0 17 - 1 22 Thousand/µL Final    Eosinophils Absolute 11/29/2018 0 09  0 00 - 0 61 Thousand/µL Final    Basophils Absolute 11/29/2018 0 03  0 00 - 0 10 Thousands/µL Final    Troponin I 11/29/2018 <0 02  <=0 04 ng/mL Final      [employer] Chemistry analyzer 99% cutoff is > 0 04 ng/mL in network labs     o cTnI 99% cutoff is useful only when applied to patients in the clinical setting of myocardial ischemia   o cTnI 99% cutoff should be interpreted in the context of clinical history, ECG findings and possibly cardiac imaging to establish correct diagnosis     o cTnI 99% cutoff may be suggestive but clearly not indicative of a coronary event without the clinical setting of myocardial ischemia   NT-proBNP 11/29/2018 70  <125 pg/mL Final    Ventricular Rate 11/29/2018 71  BPM Final    Atrial Rate 11/29/2018 71  BPM Final    CA Interval 11/29/2018 128  ms Final    QRSD Interval 11/29/2018 86  ms Final    QT Interval 11/29/2018 382  ms Final    QTC Interval 11/29/2018 415  ms Final    P Axis 11/29/2018 46  degrees Final    QRS Axis 11/29/2018 77  degrees Final    T Wave Axis 11/29/2018 -9  degrees Final    Ventricular Rate 11/29/2018 65  BPM Final    Atrial Rate 11/29/2018 65  BPM Final    CA Interval 11/29/2018 112  ms Final    QRSD Interval 11/29/2018 84  ms Final    QT Interval 11/29/2018 410  ms Final    QTC Interval 11/29/2018 426  ms Final    P Axis 11/29/2018 27  degrees Final    QRS Axis 11/29/2018 83  degrees Final    T Wave Axis 11/29/2018 -7  degrees Final    D-Dimer, Quant 11/29/2018 <220  <500 ng/ml (FEU) Final      Reference and upper limits to exclude DVT and PE are the same  Do not use to exclude if clinical symptoms are present  Pregnant women:  1st trimester:  <220 - 1060 ng/ml (FEU)  2nd trimester:  <220 - 1880 ng/ml (FEU)  3rd trimester:   238 - 3280 ng/ml (FEU)          Troponin I 11/30/2018 <0 02  <=0 04 ng/mL Final      [employer] Chemistry analyzer 99% cutoff is > 0 04 ng/mL in network labs     o cTnI 99% cutoff is useful only when applied to patients in the clinical setting of myocardial ischemia   o cTnI 99% cutoff should be interpreted in the context of clinical history, ECG findings and possibly cardiac imaging to establish correct diagnosis  o cTnI 99% cutoff may be suggestive but clearly not indicative of a coronary event without the clinical setting of myocardial ischemia        Troponin I 11/30/2018 <0 02  <=0 04 ng/mL Final      [employer] Chemistry analyzer 99% cutoff is > 0 04 ng/mL in network labs     o cTnI 99% cutoff is useful only when applied to patients in the clinical setting of myocardial ischemia   o cTnI 99% cutoff should be interpreted in the context of clinical history, ECG findings and possibly cardiac imaging to establish correct diagnosis  o cTnI 99% cutoff may be suggestive but clearly not indicative of a coronary event without the clinical setting of myocardial ischemia   Troponin I 11/30/2018 <0 02  <=0 04 ng/mL Final      [employer]ens Chemistry analyzer 99% cutoff is > 0 04 ng/mL in network labs     o cTnI 99% cutoff is useful only when applied to patients in the clinical setting of myocardial ischemia   o cTnI 99% cutoff should be interpreted in the context of clinical history, ECG findings and possibly cardiac imaging to establish correct diagnosis  o cTnI 99% cutoff may be suggestive but clearly not indicative of a coronary event without the clinical setting of myocardial ischemia   Sodium 11/30/2018 137  136 - 145 mmol/L Final    Potassium 11/30/2018 3 8  3 5 - 5 3 mmol/L Final    Chloride 11/30/2018 106  100 - 108 mmol/L Final    CO2 11/30/2018 26  21 - 32 mmol/L Final    ANION GAP 11/30/2018 5  4 - 13 mmol/L Final    BUN 11/30/2018 13  5 - 25 mg/dL Final    Creatinine 11/30/2018 0 73  0 60 - 1 30 mg/dL Final    Standardized to IDMS reference method    Glucose 11/30/2018 94  65 - 140 mg/dL Final      If the patient is fasting, the ADA then defines impaired fasting glucose as > 100 mg/dL and diabetes as > or equal to 123 mg/dL  Specimen collection should occur prior to Sulfasalazine administration due to the potential for falsely depressed results  Specimen collection should occur prior to Sulfapyridine administration due to the potential for falsely elevated results      Calcium 11/30/2018 9 0  8 3 - 10 1 mg/dL Final    eGFR 11/30/2018 109  ml/min/1 73sq m Final    WBC 11/30/2018 7 58  4 31 - 10 16 Thousand/uL Final    RBC 11/30/2018 4 00  3 81 - 5 12 Million/uL Final    Hemoglobin 11/30/2018 12 1  11 5 - 15 4 g/dL Final    Hematocrit 11/30/2018 35 6  34 8 - 46 1 % Final    MCV 11/30/2018 89  82 - 98 fL Final    MCH 11/30/2018 30 3  26 8 - 34 3 pg Final    MCHC 11/30/2018 34 0  31 4 - 37 4 g/dL Final    RDW 11/30/2018 12 3  11 6 - 15 1 % Final    Platelets 21/26/3293 195  149 - 390 Thousands/uL Final    MPV 11/30/2018 11 2  8 9 - 12 7 fL Final    Preg, Serum 11/30/2018 Negative  Negative Final    TSH 3RD GENERATON 11/30/2018 0 616  0 358 - 3 740 uIU/mL Final    Using supplements with high doses of biotin 20 to more than 300 times greater than the adequate daily intake for adults of 30 mcg/day as established by the [place] [employer], can cause falsely depress results      Ventricular Rate 12/02/2018 115  BPM Final    Atrial Rate 12/02/2018 115  BPM Final    GA Interval 12/02/2018 166  ms Final    QRSD Interval 12/02/2018 72  ms Final    QT Interval 12/02/2018 316  ms Final    QTC Interval 12/02/2018 437  ms Final    P Axis 12/02/2018 75  degrees Final    QRS Axis 12/02/2018 73  degrees Final    T Wave Germfask 12/02/2018 -82  degrees Final    Ventricular Rate 12/02/2018 130  BPM Final    Atrial Rate 12/02/2018 130  BPM Final    GA Interval 12/02/2018 134  ms Final    QRSD Interval 12/02/2018 54  ms Final    QT Interval 12/02/2018 244  ms Final    QTC Interval 12/02/2018 359  ms Final    P Axis 12/02/2018 80  degrees Final    QRS Axis 12/02/2018 72  degrees Final    T Wave Germfask 12/02/2018 269  degrees Final    Ventricular Rate 12/02/2018 143  BPM Final    Atrial Rate 12/02/2018 143  BPM Final    GA Interval 12/02/2018 136  ms Final    QRSD Interval 12/02/2018 64  ms Final    QT Interval 12/02/2018 246  ms Final    QTC Interval 12/02/2018 379  ms Final    P Axis 12/02/2018 79  degrees Final    QRS Axis 12/02/2018 71  degrees Final    T Wave Germfask 12/02/2018 258  degrees Final    Magnesium 12/03/2018 2 1  1 6 - 2 6 mg/dL Final    Sodium 12/07/2018 138  136 - 145 mmol/L Final    Potassium 12/07/2018 4 0  3 5 - 5 3 mmol/L Final    Chloride 12/07/2018 107  100 - 108 mmol/L Final    CO2 12/07/2018 23  21 - 32 mmol/L Final    ANION GAP 12/07/2018 8  4 - 13 mmol/L Final    BUN 12/07/2018 11  5 - 25 mg/dL Final    Creatinine 12/07/2018 0 68  0 60 - 1 30 mg/dL Final    Standardized to IDMS reference method    Glucose 12/07/2018 85  65 - 140 mg/dL Final      If the patient is fasting, the ADA then defines impaired fasting glucose as > 100 mg/dL and diabetes as > or equal to 123 mg/dL  Specimen collection should occur prior to Sulfasalazine administration due to the potential for falsely depressed results  Specimen collection should occur prior to Sulfapyridine administration due to the potential for falsely elevated results      Calcium 12/07/2018 8 4  8 3 - 10 1 mg/dL Final    eGFR 12/07/2018 115  ml/min/1 73sq m Final    WBC 12/07/2018 8 03  4 31 - 10 16 Thousand/uL Final    RBC 12/07/2018 4 38  3 81 - 5 12 Million/uL Final    Hemoglobin 12/07/2018 13 2  11 5 - 15 4 g/dL Final    Hematocrit 12/07/2018 38 5  34 8 - 46 1 % Final    MCV 12/07/2018 88  82 - 98 fL Final    MCH 12/07/2018 30 1  26 8 - 34 3 pg Final    MCHC 12/07/2018 34 3  31 4 - 37 4 g/dL Final    RDW 12/07/2018 12 1  11 6 - 15 1 % Final    Platelets 93/99/7571 188  149 - 390 Thousands/uL Final    MPV 12/07/2018 11 2  8 9 - 12 7 fL Final

## 2019-09-04 NOTE — PATIENT INSTRUCTIONS
Do bloodwork  Do back x-rays  [facility] referral made  Take gabapentin at bedtime    Return to clinic as needed      Fibromyalgia   WHAT YOU NEED TO KNOW:   What is fibromyalgia? Fibromyalgia is a long-term condition that causes pain and tender points throughout your body  Fibromyalgia can start at any age and is more common in women than in men  What causes fibromyalgia? Healthcare providers do not know exactly what causes fibromyalgia  Problems with chemicals that send pain messages to and from the brain are thought to cause fibromyalgia  It may also be caused or triggered by any of the following:  · Hormone changes    · Physical injury    · Intense emotional trauma from sexual, physical, or emotional abuse  What are the signs and symptoms of fibromyalgia? The most common symptom of fibromyalgia is widespread pain for at least 3 months  You may also have tender spots  Tender spots are specific areas or points on both sides of your body that are painful when pressed  You may have tender spots in your neck, upper chest, shoulders, or shoulder blades  Other common areas are the elbows, lower back, sides of the thighs, and knees  You may also have any of the following:  · Fatigue and difficulty sleeping    · Diarrhea, constipation, pain, or bloating    · Headaches, memory problems, difficulty concentrating, or anxiety    · Numbness, muscle stiffness, or swelling of the hands and feet    · Pounding, racing heartbeats or chest pain  How is fibromyalgia diagnosed? Your healthcare provider will examine you and ask about your symptoms and other health conditions  He will do a manual tender point exam and press on specific sites or points in your body  Increased pain in most of these spots means a positive tender point exam  There are no specific lab tests to diagnose fibromyalgia  Blood and urine tests, a spinal tap, or sleep studies may be done to rule out other causes of pain  How is fibromyalgia treated? Fibromyalgia can be treated but not cured  The following can help you manage your pain and other symptoms:  · Acetaminophen and ibuprofen: These medicines decrease pain  They are available without a doctor's order  Ask your healthcare provider which medicine is right for you  Ask how much to take and how often to take it  Follow directions  These medicines can cause stomach bleeding if not taken correctly  Ibuprofen can cause kidney damage  Acetaminophen can cause liver damage  · Pain medicine: You may be given a prescription medicine to decrease pain  Do not wait until the pain is severe before you take this medicine  · Muscle relaxers  help decrease pain and muscle spasms  · Antidepressants: These help decrease depression, pain, and fatigue  · Antiseizure medicine: This is used to reduce fibromyalgia pain  What are the risks of fibromyalgia? If untreated, your symptoms may get worse  Pain may make it difficult to do daily activities  Your risk for fatigue, headaches, and depression may increase  How can I manage my symptoms? · Keep a pain diary:  Record your symptoms and what activity caused them  This may also help you track pain cycles and show a pattern to your symptoms  · Exercise:  Ask your healthcare provider about the best exercise plan for you  Exercise and other strength-training activities may decrease pain and sleep problems  · Set good sleep habits:  Do not nap during the day  Go to bed at the same time each night  Make sure your bedroom is dark, quiet, and comfortable  Do not stay in bed if you cannot sleep  Get up and do something relaxing until you are sleepy  Do not drink caffeine or alcohol right before you go to bed  These can make it difficult for you to sleep  Limit other liquids to help decrease your need to urinate in the night  Where can I find support and more information?    · National Chronic Fatigue Syndrome and Fibromyalgia Association  [address] [address]  [facility] , [address] [place] [address]  Phone: [phone]  Web Address: [employer] ee  org  When should I contact my healthcare provider? · You pain increases, even after you take your pain medicine  · You have difficulty sleeping  · You have questions or concerns about your condition or care  When should I seek immediate care or call 911? · You are depressed and feel you cannot cope with your condition  CARE AGREEMENT:   You have the right to help plan your care  Learn about your health condition and how it may be treated  Discuss treatment options with your caregivers to decide what care you want to receive  You always have the right to refuse treatment  The above information is an  only  It is not intended as medical advice for individual conditions or treatments  Talk to your doctor, nurse or pharmacist before following any medical regimen to see if it is safe and effective for you  © 2017 [address] [employer] Information is for End User's use only and may not be sold, redistributed or otherwise used for commercial purposes  All illustrations and images included in CareNotes® are the copyrighted property of [employer] M [employer]  or Paxton Underwood

## 2019-09-05 LAB — ALDOLASE SERPL-CCNC: 5.2 U/L (ref 3.3–10.3)

## 2019-09-06 PROBLEM — M79.10 MYALGIA: Status: ACTIVE | Noted: 2019-09-06

## 2019-09-06 PROBLEM — M54.50 CHRONIC LOW BACK PAIN WITHOUT SCIATICA: Status: ACTIVE | Noted: 2019-09-06

## 2019-09-06 PROBLEM — G89.29 CHRONIC LOW BACK PAIN WITHOUT SCIATICA: Status: ACTIVE | Noted: 2019-09-06

## 2019-09-06 LAB — RYE IGE QN: NEGATIVE

## 2019-09-06 RX ORDER — ERGOCALCIFEROL (VITAMIN D2) 1250 MCG
50000 CAPSULE ORAL WEEKLY
Qty: 12 CAPSULE | Refills: 1 | Status: SHIPPED | OUTPATIENT
Start: 2019-09-06 | End: 2020-03-12

## 2019-09-09 LAB
ENA SS-A AB SER-ACNC: <0.2 AI (ref 0–0.9)
ENA SS-B AB SER-ACNC: <0.2 AI (ref 0–0.9)

## 2019-11-08 ENCOUNTER — HOSPITAL ENCOUNTER (OUTPATIENT)
Dept: RADIOLOGY | Facility: HOSPITAL | Age: 34
Discharge: HOME | End: 2019-11-08
Attending: INTERNAL MEDICINE
Payer: COMMERCIAL

## 2019-11-08 DIAGNOSIS — R53.82 CHRONIC FATIGUE: ICD-10-CM

## 2019-11-08 PROCEDURE — 72200 X-RAY EXAM SI JOINTS: CPT

## 2019-12-23 NOTE — PATIENT INSTRUCTIONS
Wellness Visit for Adults   AMBULATORY CARE:   A wellness visit  is when you see your healthcare provider to get screened for health problems  You can also get advice on how to stay healthy  Write down your questions so you remember to ask them  Ask your healthcare provider how often you should have a wellness visit  What happens at a wellness visit:  Your healthcare provider will ask about your health, and your family history of health problems  This includes high blood pressure, heart disease, and cancer  He or she will ask if you have symptoms that concern you, if you smoke, and about your mood  You may also be asked about your intake of medicines, supplements, food, and alcohol  Any of the following may be done:  · Your weight  will be checked  Your height may also be checked so your body mass index (BMI) can be calculated  Your BMI shows if you are at a healthy weight  · Your blood pressure  and heart rate will be checked  Your temperature may also be checked  · Blood and urine tests  may be done  Blood tests may be done to check your cholesterol levels  Abnormal cholesterol levels increase your risk for heart disease and stroke  You may also need a blood or urine test to check for diabetes if you are at increased risk  Urine tests may be done to look for signs of an infection or kidney disease  · A physical exam  includes checking your heartbeat and lungs with a stethoscope  Your healthcare provider may also check your skin to look for sun damage  · Screening tests  may be recommended  A screening test is done to check for diseases that may not cause symptoms  The screening tests you may need depend on your age, gender, family history, and lifestyle habits  For example, colorectal screening may be recommended if you are 48years old or older  Screening tests you need if you are a woman:   · A Pap smear  is used to screen for cervical cancer   Pap smears are usually done every 3 to 5 years depending on your age  You may need them more often if you have had abnormal Pap smear test results in the past  Ask your healthcare provider how often you should have a Pap smear  · A mammogram  is an x-ray of your breasts to screen for breast cancer  Experts recommend mammograms every 2 years starting at age 48 years  You may need a mammogram at age 52 years or younger if you have an increased risk for breast cancer  Talk to your healthcare provider about when you should start having mammograms and how often you need them  Vaccines you may need:   · Get an influenza vaccine  every year  The influenza vaccine protects you from the flu  Several types of viruses cause the flu  The viruses change over time, so new vaccines are made each year  · Get a tetanus-diphtheria (Td) booster vaccine  every 10 years  This vaccine protects you against tetanus and diphtheria  Tetanus is a severe infection that may cause painful muscle spasms and lockjaw  Diphtheria is a severe bacterial infection that causes a thick covering in the back of your mouth and throat  · Get a human papillomavirus (HPV) vaccine  if you are female and aged 23 to 32 or male 23 to 24 and never received it  This vaccine protects you from HPV infection  HPV is the most common infection spread by sexual contact  HPV may also cause vaginal, penile, and anal cancers  · Get a pneumococcal vaccine  if you are aged 72 years or older  The pneumococcal vaccine is an injection given to protect you from pneumococcal disease  Pneumococcal disease is an infection caused by pneumococcal bacteria  The infection may cause pneumonia, meningitis, or an ear infection  · Get a shingles vaccine  if you are aged 61 or older, even if you have had shingles before  The shingles vaccine is an injection to protect you from the varicella-zoster virus  This is the same virus that causes chickenpox   Shingles is a painful rash that develops in people who had chickenpox or have been exposed to the virus  How to eat healthy:  My Plate is a model for planning healthy meals  It shows the types and amounts of foods that should go on your plate  Fruits and vegetables make up about half of your plate, and grains and protein make up the other half  A serving of dairy is included on the side of your plate  The amount of calories and serving sizes you need depends on your age, gender, weight, and height  Examples of healthy foods are listed below:  · Eat a variety of vegetables  such as dark green, red, and orange vegetables  You can also include canned vegetables low in sodium (salt) and frozen vegetables without added butter or sauces  · Eat a variety of fresh fruits , canned fruit in 100% juice, frozen fruit, and dried fruit  · Include whole grains  At least half of the grains you eat should be whole grains  Examples include whole-wheat bread, wheat pasta, brown rice, and whole-grain cereals such as oatmeal     · Eat a variety of protein foods such as seafood (fish and shellfish), lean meat, and poultry without skin (turkey and chicken)  Examples of lean meats include pork leg, shoulder, or tenderloin, and beef round, sirloin, tenderloin, and extra lean ground beef  Other protein foods include eggs and egg substitutes, beans, peas, soy products, nuts, and seeds  · Choose low-fat dairy products such as skim or 1% milk or low-fat yogurt, cheese, and cottage cheese  · Limit unhealthy fats  such as butter, hard margarine, and shortening  Exercise:  Exercise at least 30 minutes per day on most days of the week  Some examples of exercise include walking, biking, dancing, and swimming  You can also fit in more physical activity by taking the stairs instead of the elevator or parking farther away from stores  Include muscle strengthening activities 2 days each week  Regular exercise provides many health benefits   It helps you manage your weight, and decreases your risk for type 2 diabetes, heart disease, stroke, and high blood pressure  Exercise can also help improve your mood  Ask your healthcare provider about the best exercise plan for you  General health and safety guidelines:   · Do not smoke  Nicotine and other chemicals in cigarettes and cigars can cause lung damage  Ask your healthcare provider for information if you currently smoke and need help to quit  E-cigarettes or smokeless tobacco still contain nicotine  Talk to your healthcare provider before you use these products  · Limit alcohol  A drink of alcohol is 12 ounces of beer, 5 ounces of wine, or 1½ ounces of liquor  · Lose weight, if needed  Being overweight increases your risk of certain health conditions  These include heart disease, high blood pressure, type 2 diabetes, and certain types of cancer  · Protect your skin  Do not sunbathe or use tanning beds  Use sunscreen with a SPF 15 or higher  Apply sunscreen at least 15 minutes before you go outside  Reapply sunscreen every 2 hours  Wear protective clothing, hats, and sunglasses when you are outside  · Drive safely  Always wear your seatbelt  Make sure everyone in your car wears a seatbelt  A seatbelt can save your life if you are in an accident  Do not use your cell phone when you are driving  This could distract you and cause an accident  Pull over if you need to make a call or send a text message  · Practice safe sex  Use latex condoms if are sexually active and have more than one partner  Your healthcare provider may recommend screening tests for sexually transmitted infections (STIs)  · Wear helmets, lifejackets, and protective gear  Always wear a helmet when you ride a bike or motorcycle, go skiing, or play sports that could cause a head injury  Wear protective equipment when you play sports  Wear a lifejacket when you are on a boat or doing water sports    © 2017 [employer] Information is for End User's use only and may not be sold, redistributed or otherwise used for commercial purposes  All illustrations and images included in CareNotes® are the copyrighted property of [employer]  or [employer]xton Underwood  The above information is an  only  It is not intended as medical advice for individual conditions or treatments  Talk to your doctor, nurse or pharmacist before following any medical regimen to see if it is safe and effective for you  Breast Self Exam for Women   WHAT YOU NEED TO KNOW:   A BSE is a way to check your breasts for lumps and other changes  Regular BSEs can help you know how your breasts normally look and feel  Most breast lumps or changes are not cancer, but you should always have them checked by a healthcare provider  Your healthcare provider can also watch you do a BSE and can tell you if you are doing your BSE correctly  DISCHARGE INSTRUCTIONS:   Contact your healthcare provider if:   · You find any lumps or changes in your breasts  · You have breast pain or fluid coming from your nipples  · You have questions or concerns about your condition or care  Why you should do a BSE:  Breast cancer is the most common type of cancer in women  Even if you have mammograms, you may still want to do a BSE regularly  If you know how your breasts normally feel and look, it may help you know when to contact your healthcare provider  Mammograms can miss some cancers  You may find a lump during a BSE that did not show up on your mammogram   When you should do a BSE:  Hanna Perrys your calendar to help you remember to do BSE on a regular schedule  One easy way to remember to do a BSE is to do the exam on the same day of each month  If you have periods, you may want to do your BSE 1 week after your period ends  This is the time when your breasts may be the least swollen, lumpy, or tender  You can do regular BSEs even if you are breastfeeding or have breast implants     How to do a BSE:   · Look at your breasts in a mirror  Look at the size and shape of each breast and nipple  Check for swelling, lumps, dimpling, scaly skin, or other skin changes  Look for nipple changes, such as a nipple that is painful or beginning to pull inward  Gently squeeze both nipples and check to see if fluid (that is not breast milk) comes out of them  If you find any of these or other breast changes, contact your healthcare provider  Check your breasts while you sit or  the following 3 positions:    [facility] your arms down at your sides  ¨ Raise your hands and join them behind your head  ¨ Put firm pressure with your hands on your hips  Bend slightly forward while you look at your breasts in the mirror  · Lie down and feel your breasts  When you lie down, your breast tissue spreads out evenly over your chest  This makes it easier for you to feel for lumps and anything that may not be normal for your breasts  Do a BSE on one breast at a time  ¨ Place a small pillow or towel under your left shoulder  Put your left arm behind your head  ¨ Use the 3 middle fingers of your right hand  Use your fingertip pads, on the top of your fingers  Your fingertip pad is the most sensitive part of your finger  ¨ Use small circles to feel your breast tissue  Use your fingertip pads to make dime-sized, overlapping circles on your breast and armpits  Use light, medium, and firm pressure  First, press lightly  Second, press with medium pressure to feel a little deeper into the breast  Last, use firm pressure to feel deep within your breast     ¨ Examine your entire breast area  Examine the breast area from above the breast to below the breast where you feel only ribs  Make small circles with your fingertips, starting in the middle of your armpit  Make circles going up and down the breast area  Continue toward your breast and all the way across it   Examine the area from your armpit all the way over to the middle of your chest (breastbone)  Stop at the middle of your chest     ¨ Move the pillow or towel to your right shoulder, and put your right arm behind your head  Use the 3 fingertip pads of your left hand, and repeat the above steps to do a BSE on your right breast        What else you can do to check for breast problems or cancer:  Some experts suggest that women 36years of age or older should have a mammogram every year  Other experts suggest that women between the ages of 48and 76years old should have a mammogram every 2 years  Talk to your healthcare provider about when you should have a mammogram   Follow up with your healthcare provider as directed: Your healthcare provider can watch you and tell you if you are doing your BSE correctly  Write down your questions so you remember to ask them during your visits  © 2017 [employer] Information is for End User's use only and may not be sold, redistributed or otherwise used for commercial purposes  All illustrations and images included in CareNotes® are the copyrighted property of [employer]  or Paxton Underwood  The above information is an  only  It is not intended as medical advice for individual conditions or treatments  Talk to your doctor, nurse or pharmacist before following any medical regimen to see if it is safe and effective for you

## 2019-12-23 NOTE — PROGRESS NOTES
Assessment        Diagnoses and all orders for this visit:    Encounter for gynecological examination (general) (routine) without abnormal findings    PMDD (premenstrual dysphoric disorder)    Secondary oligomenorrhea  -     US pelvis complete w transvaginal; Future  -     DHEA-sulfate; Future  -     hCG, quantitative; Future  -     Follicle stimulating hormone; Future  -     TSH, 3rd generation with Free T4 reflex; Future  -     Testosterone, free, total; Future  -     Prolactin; Future    Weight gain    Cervical cancer screening  -     Liquid-based pap, screening                  Plan      All questions answered  Blood tests: Prolactin hormone level, TSH and see orders  Breast self exam technique reviewed and patient encouraged to perform self-exam monthly  Contraception: coitus interruptus and condoms  Diagnosis explained in detail, including differential   Educational material distributed  Follow up in 1 year  Follow up as needed  Pap smear  Pelvic ultrasound  Follow-up in 2 weeks for results    Pt requests PAP regardless of guidelines  Pt declines contraception for now  Pelvic US was ordered to r/o PCOS including labs          Subjective      Lonzell Gretaaw Toño Pierre is a 29 y o  female who presents for annual exam           Chief Complaint   Patient presents with    Gynecologic Exam    Weight Gain    Menstrual Problem     pt states it is regular but is every 21 days instead of every 28 days  Patient was in the hospital last year and has POTS  Pt had  20-30 lbs weight gain since last year  She has been complaining of periods q21 days and very light  She has a h/o endometriosis          Last Pap: 12/14/18 neg HR HPV    Current contraception: coitus interruptus and condoms  History of abnormal Pap smear: no  History of abnormal mammogram: no  Family history of uterine or ovarian cancer: no  Family history of breast cancer: no  Family history of colon cancer: no      Menstrual History:  OB History    5    Para   3    Term   3            AB   2    Living   3       SAB   1    TAB        Ectopic   1    Multiple   0    Live Births   1                Menarche age: 15  Patient's last menstrual period was 2019 (exact date)    Period Cycle (Days): 21  Period Duration (Days): (3)  Period Pattern: Regular  Menstrual Flow: Light      Past Medical History:   Diagnosis Date    Acute Lyme disease     Endometriosis     Endometriosis     Fibromyalgia     Mitral valve regurgitation     MVP (mitral valve prolapse)     POTS (postural orthostatic tachycardia syndrome)     PVC (premature ventricular contraction)     SVT (supraventricular tachycardia) (HCC)      Past Surgical History:   Procedure Laterality Date    ABLATION OF DYSRHYTHMIC FOCUS      DIAGNOSTIC LAPAROSCOPY      for endometriosis    LAPAROSCOPY FOR ECTOPIC PREGNANCY      WISDOM TOOTH EXTRACTION       Social History     Socioeconomic History    Marital status: /Civil Union     Spouse name: Not on file    Number of children: 3    Years of education: Not on file    Highest education level: Not on file   Occupational History    Not on file   Social Needs    Financial resource strain: Not on file    Food insecurity:     Worry: Not on file     Inability: Not on file    Transportation needs:     Medical: Not on file     Non-medical: Not on file   Tobacco Use    Smoking status: Former Smoker     Last attempt to quit: 2013     Years since quittin 3    Smokeless tobacco: Never Used   Substance and Sexual Activity    Alcohol use: Yes     Frequency: Never     Drinks per session: 1 or 2     Binge frequency: Never     Comment: Socially/ Rarely    Drug use: No    Sexual activity: Yes     Partners: Male   Lifestyle    Physical activity:     Days per week: Not on file     Minutes per session: Not on file    Stress: Not on file   Relationships    Social connections:     Talks on phone: Not on file     Gets together: Not on file     Attends Hindu service: Not on file     Active member of club or organization: Not on file     Attends meetings of clubs or organizations: Not on file     Relationship status: Not on file    Intimate partner violence:     Fear of current or ex partner: Not on file     Emotionally abused: Not on file     Physically abused: Not on file     Forced sexual activity: Not on file   Other Topics Concern    Not on file   Social History Narrative    Not on file         Current Outpatient Medications:     Cholecalciferol (VITAMIN D) 2000 units CAPS, Take 1 capsule by mouth daily, Disp: , Rfl:     cyanocobalamin (VITAMIN B-12) 1,000 mcg tablet, Take 1,000 mcg by mouth daily, Disp: , Rfl:     dexamethasone (DECADRON) 1 mg tablet, At onset of a severe headache, Disp: 5 tablet, Rfl: 0    ergocalciferol (ERGOCALCIFEROL) 56491 units capsule, Take 1 capsule (50,000 Units total) by mouth once a week, Disp: 12 capsule, Rfl: 1    gabapentin (NEURONTIN) 100 mg capsule, Take 1 capsule (100 mg total) by mouth daily at bedtime, Disp: 30 capsule, Rfl: 3    Prenatal Multivit-Min-Fe-FA (PRE- FORMULA) TABS, Take 1 tablet by mouth daily, Disp: , Rfl:     PREVIDENT 5000 BOOSTER PLUS 1 1 % PSTE, Take 1 application by mouth daily Use as directed, Disp: , Rfl: 3    rizatriptan (MAXALT) 10 MG tablet, Take 1 tablet (10 mg total) by mouth once as needed for migraine May repeat every 2 hours if needed, max 30mg/24 hours, Disp: 12 tablet, Rfl: 3    Allergies   Allergen Reactions    Pollen Extract            Review of Systems   Constitutional: Negative  HENT: Negative  Eyes: Negative  Respiratory: Negative  Cardiovascular: Negative  Gastrointestinal: Negative  Endocrine: Negative  Genitourinary:        As noted in HPI   Musculoskeletal: Negative  Skin: Negative  Allergic/Immunologic: Negative  Neurological: Negative  Hematological: Negative  Psychiatric/Behavioral: Negative  /62 (BP Location: Right arm, Patient Position: Sitting, Cuff Size: Standard)   Ht 5' 1" (1 549 m)   Wt 63 3 kg (139 lb 9 6 oz)   LMP 12/17/2019 (Exact Date)   BMI 26 38 kg/m²         Physical Exam   Constitutional: She is oriented to person, place, and time  She appears well-developed and well-nourished  Genitourinary: Rectum normal, vagina normal and uterus normal  Pelvic exam was performed with patient supine  There is no rash or lesion on the right labia  There is no rash or lesion on the left labia  Vagina exhibits rugosity  Vagina exhibits no lesion  No bleeding in the vagina  No vaginal discharge found  Right adnexum does not display mass, does not display tenderness and does not display fullness  Left adnexum does not display mass, does not display tenderness and does not display fullness  Cervix does not exhibit motion tenderness, lesion or polyp  Uterus is not enlarged or tender  Rectal exam shows no external hemorrhoid  Genitourinary Comments: Perineum and anus: Normal    Pelvic support  - Vaginal outlet: normal  - Anterior vaginal wall: normal  - Posterior vaginal wall: normal    Bladder: normal    Urethra: normal    Urethral support: normal       HENT:   Head: Normocephalic  Neck: No thyromegaly present  Cardiovascular: Normal rate, regular rhythm and normal heart sounds  No murmur heard  Pulmonary/Chest: Effort normal and breath sounds normal  No respiratory distress  She has no wheezes  She has no rales  Right breast exhibits no mass, no nipple discharge, no skin change and no tenderness  Left breast exhibits no mass, no nipple discharge, no skin change and no tenderness  Abdominal: Soft  She exhibits no distension and no mass  There is no tenderness  There is no rebound and no guarding  Musculoskeletal: She exhibits no edema or tenderness  Lymphadenopathy:        Right: No inguinal adenopathy present  Left: No inguinal adenopathy present     Neurological: She is alert and oriented to person, place, and time  Skin: Skin is warm and dry  Psychiatric: She has a normal mood and affect

## 2019-12-24 ENCOUNTER — ANNUAL EXAM (OUTPATIENT)
Dept: OBGYN CLINIC | Facility: CLINIC | Age: 34
End: 2019-12-24
Payer: COMMERCIAL

## 2019-12-24 ENCOUNTER — APPOINTMENT (OUTPATIENT)
Dept: LAB | Facility: CLINIC | Age: 34
End: 2019-12-24
Payer: COMMERCIAL

## 2019-12-24 VITALS
BODY MASS INDEX: 26.36 KG/M2 | HEIGHT: 61 IN | DIASTOLIC BLOOD PRESSURE: 62 MMHG | WEIGHT: 139.6 LBS | SYSTOLIC BLOOD PRESSURE: 104 MMHG

## 2019-12-24 DIAGNOSIS — R63.5 WEIGHT GAIN: ICD-10-CM

## 2019-12-24 DIAGNOSIS — N91.4 SECONDARY OLIGOMENORRHEA: ICD-10-CM

## 2019-12-24 DIAGNOSIS — Z12.4 CERVICAL CANCER SCREENING: ICD-10-CM

## 2019-12-24 DIAGNOSIS — F32.81 PMDD (PREMENSTRUAL DYSPHORIC DISORDER): ICD-10-CM

## 2019-12-24 DIAGNOSIS — Z01.419 ENCOUNTER FOR GYNECOLOGICAL EXAMINATION (GENERAL) (ROUTINE) WITHOUT ABNORMAL FINDINGS: Primary | ICD-10-CM

## 2019-12-24 LAB
B-HCG SERPL-ACNC: <2 MIU/ML
FSH SERPL-ACNC: 4.2 MIU/ML
PROLACTIN SERPL-MCNC: 6.4 NG/ML
TSH SERPL DL<=0.05 MIU/L-ACNC: 0.63 UIU/ML (ref 0.36–3.74)

## 2019-12-24 PROCEDURE — 84402 ASSAY OF FREE TESTOSTERONE: CPT

## 2019-12-24 PROCEDURE — G0145 SCR C/V CYTO,THINLAYER,RESCR: HCPCS | Performed by: OBSTETRICS & GYNECOLOGY

## 2019-12-24 PROCEDURE — 84443 ASSAY THYROID STIM HORMONE: CPT

## 2019-12-24 PROCEDURE — 84146 ASSAY OF PROLACTIN: CPT

## 2019-12-24 PROCEDURE — 84403 ASSAY OF TOTAL TESTOSTERONE: CPT

## 2019-12-24 PROCEDURE — S0612 ANNUAL GYNECOLOGICAL EXAMINA: HCPCS | Performed by: OBSTETRICS & GYNECOLOGY

## 2019-12-24 PROCEDURE — 36415 COLL VENOUS BLD VENIPUNCTURE: CPT

## 2019-12-24 PROCEDURE — 82627 DEHYDROEPIANDROSTERONE: CPT

## 2019-12-24 PROCEDURE — 84702 CHORIONIC GONADOTROPIN TEST: CPT

## 2019-12-24 PROCEDURE — 83001 ASSAY OF GONADOTROPIN (FSH): CPT

## 2019-12-25 LAB — DHEA-S SERPL-MCNC: 153.5 UG/DL (ref 84.8–378)

## 2019-12-26 ENCOUNTER — HOSPITAL ENCOUNTER (OUTPATIENT)
Dept: RADIOLOGY | Age: 34
Discharge: HOME | End: 2019-12-26
Payer: COMMERCIAL

## 2019-12-26 DIAGNOSIS — N91.4 SECONDARY OLIGOMENORRHEA: ICD-10-CM

## 2019-12-26 LAB
TESTOST FREE SERPL-MCNC: 0.8 PG/ML (ref 0–4.2)
TESTOST SERPL-MCNC: 11 NG/DL (ref 8–48)

## 2019-12-26 PROCEDURE — 76830 TRANSVAGINAL US NON-OB: CPT

## 2019-12-26 PROCEDURE — 76856 US EXAM PELVIC COMPLETE: CPT

## 2019-12-30 LAB
LAB AP GYN PRIMARY INTERPRETATION: NORMAL
Lab: NORMAL

## 2020-01-06 NOTE — PROGRESS NOTES
Assessment/Plan:     Diagnoses and all orders for this visit:    PMDD (premenstrual dysphoric disorder)    Secondary oligomenorrhea    Weight gain  -     Ambulatory referral to Endocrinology; Future          Discussed with pt test results in detail  US Laboratory testing are normal and does not show PCOS  She declines OCPs to treat menstrual irregularity  She declines to start SSRI for PMS/PMDD  She would like to see endocrinologist to see if additional testing is needed  She also was given information for Dr Joleen James since she is concerned about "hormonal imbalance"  Follow-up in 1 year or as needed  Subjective   Patient ID: Cheril Sever is a 29 y o  female  Chief Complaint   Patient presents with    Follow-up    Results         HPI:    Patient was previously seen for annual exam   She had complained of really light periods and labs and US was ordered to r/o PCOS  PRL 6 4 normal  Testosterone levels normal  DHEA-S 153 5  FSH normal  HCG normal   TSH normal  US normal and did not show PCOS  Patient complains of weight gain  She also complains of PMS symptoms  She has been diagnosed with fibromyalgia as well and thinks her symptoms are worse prior to her cycles  She was previously prescribed [facility] but declines to start this  Menstrual History:  OB History        5    Para   3    Term   3            AB   2    Living   3       SAB   1    TAB        Ectopic   1    Multiple   0    Live Births   3                  Patient's last menstrual period was 2019 (exact date)           Past Medical History:   Diagnosis Date    Acute Lyme disease     Endometriosis     Endometriosis     Fibromyalgia     Mitral valve regurgitation     MVP (mitral valve prolapse)     POTS (postural orthostatic tachycardia syndrome)     PVC (premature ventricular contraction)     SV[facility]ra[facility]ricular tachycardia) ([facility])        Past Surgical History:   Procedure Laterality Date  ABLATION OF DYSRHYTHMIC FOCUS      DIAGNOSTIC LAPAROSCOPY      for endometriosis    LAPAROSCOPY FOR ECTOPIC PREGNANCY      WISDOM TOOTH EXTRACTION         Social History     Socioeconomic History    Marital status: /Civil Union     Spouse name: Not on file    Number of children: 3    Years of education: Not on file    Highest education level: Not on file   Occupational History    Not on file   Social Needs    Financial resource strain: Not on file    Food insecurity:     Worry: Not on file     Inability: Not on file    Transportation needs:     Medical: Not on file     Non-medical: Not on file   Tobacco Use    Smoking status: Former Smoker     Last attempt to quit: 2013     Years since quittin 3    Smokeless tobacco: Never Used   Substance and Sexual Activity    Alcohol use: Yes     Frequency: Never     Drinks per session: 1 or 2     Binge frequency: Never     Comment: Socially/ Rarely    Drug use: No    Sexual activity: Yes     Partners: Male   Lifestyle    Physical activity:     Days per week: Not on file     Minutes per session: Not on file    Stress: Not on file   Relationships    Social connections:     Talks on phone: Not on file     Gets together: Not on file     Attends Jain service: Not on file     Active member of club or organization: Not on file     Attends meetings of clubs or organizations: Not on file     Relationship status: Not on file    Intimate partner violence:     Fear of current or ex partner: Not on file     Emotionally abused: Not on file     Physically abused: Not on file     Forced sexual activity: Not on file   Other Topics Concern    Not on file   Social History Narrative    Not on file       Allergies   Allergen Reactions    Pollen Extract          Current Outpatient Medications:     Cholecalciferol (VITAMIN D) 2000 units CAPS, Take 1 capsule by mouth daily, Disp: , Rfl:     cyanocobalamin (VITAMIN B-12) 1,000 mcg tablet, Take 1,000 mcg by mouth daily, Disp: , Rfl:     dexamethasone (DECADRON) 1 mg tablet, At onset of a severe headache, Disp: 5 tablet, Rfl: 0    ergocalciferol (ERGOCALCIFEROL) 85218 units capsule, Take 1 capsule (50,000 Units total) by mouth once a week, Disp: 12 capsule, Rfl: 1    gabapentin (NEURONTIN) 100 mg capsule, Take 1 capsule (100 mg total) by mouth daily at bedtime, Disp: 30 capsule, Rfl: 3    Prenatal Multivit-Min-Fe-FA (PRE- FORMULA) TABS, Take 1 tablet by mouth daily, Disp: , Rfl:     PREVIDENT 5000 BOOSTER PLUS 1 1 % PSTE, Take 1 application by mouth daily Use as directed, Disp: , Rfl: 3    rizatriptan (MAXALT) 10 MG tablet, Take 1 tablet (10 mg total) by mouth once as needed for migraine May repeat every 2 hours if needed, max 30mg/24 hours, Disp: 12 tablet, Rfl: 3    Review of Systems   Constitutional: Negative  HENT: Negative  Eyes: Negative  Respiratory: Negative  Cardiovascular: Negative  Gastrointestinal: Negative  Endocrine: Negative  Genitourinary:        As noted in HPI   Musculoskeletal: Negative  Skin: Negative  Allergic/Immunologic: Negative  Neurological: Negative  Hematological: Negative  Psychiatric/Behavioral: Negative  /74 (BP Location: Right arm, Patient Position: Sitting, Cuff Size: Standard)   Ht 5' 1" (1 549 m)   Wt 66 kg (145 lb 9 6 oz)   LMP 2019 (Exact Date)   BMI 27 51 kg/m²     Physical Exam   Constitutional: She is oriented to person, place, and time  Vital signs are normal  She appears well-developed and well-nourished  No distress  Neurological: She is alert and oriented to person, place, and time  Skin: Skin is warm and dry  Psychiatric: She has a normal mood and affect   Her behavior is normal            The following portions of the patient's history were reviewed and updated as appropriate: allergies, current medications, past family history, past medical history, past social history, past surgical history and problem list       Counseling / Coordination of Care  Total time spent today15 minutes  minutes  Greater than 50% of total time was spent with the patient and / or family counseling and / or coordination of care  US pelvis complete w transvaginal   Status: Final result   PACS Images      Show images for US pelvis complete w transvaginal   Study Result     PELVIC ULTRASOUND, COMPLETE     INDICATION:  29years old  N91 4: Secondary oligomenorrhea  LMP December 17, 2019     COMPARISON: None     TECHNIQUE:   Transabdominal pelvic ultrasound was performed in sagittal and transverse planes with a curvilinear transducer  Additional transvaginal imaging was performed to better evaluate the endometrium and ovaries  Imaging included volumetric   sweeps as well as traditional still imaging technique      FINDINGS:     UTERUS:  The uterus is anteverted in position, measuring 9 1 x 3 4 x 5 2 cm  Contour and echotexture appear normal      Probable arcuate configuration  The cervix shows no suspicious abnormality      ENDOMETRIUM:    Normal caliber of 9 mm  Homogeneous and normal in appearance      OVARIES/ADNEXA:  Right ovary:  1 9 x 1 9 x 1 2 cm  No suspicious right ovarian abnormality  Doppler flow within normal limits      Left ovary:  2 6 x 1 9 x 3 1 cm  No suspicious left ovarian abnormality  Doppler flow within normal limits      No suspicious adnexal mass or loculated collections  There is no free fluid      IMPRESSION:  Normal pelvic ultrasound

## 2020-01-07 ENCOUNTER — OFFICE VISIT (OUTPATIENT)
Dept: OBGYN CLINIC | Facility: CLINIC | Age: 35
End: 2020-01-07
Payer: COMMERCIAL

## 2020-01-07 VITALS
WEIGHT: 145.6 LBS | BODY MASS INDEX: 27.49 KG/M2 | SYSTOLIC BLOOD PRESSURE: 116 MMHG | DIASTOLIC BLOOD PRESSURE: 74 MMHG | HEIGHT: 61 IN

## 2020-01-07 DIAGNOSIS — R63.5 WEIGHT GAIN: ICD-10-CM

## 2020-01-07 DIAGNOSIS — N91.4 SECONDARY OLIGOMENORRHEA: ICD-10-CM

## 2020-01-07 DIAGNOSIS — F32.81 PMDD (PREMENSTRUAL DYSPHORIC DISORDER): ICD-10-CM

## 2020-01-07 DIAGNOSIS — F32.81 PMDD (PREMENSTRUAL DYSPHORIC DISORDER): Primary | ICD-10-CM

## 2020-01-07 PROCEDURE — 99213 OFFICE O/P EST LOW 20 MIN: CPT | Performed by: OBSTETRICS & GYNECOLOGY

## 2020-01-07 RX ORDER — FLUOXETINE 10 MG/1
CAPSULE ORAL
Qty: 30 CAPSULE | Refills: 0 | OUTPATIENT
Start: 2020-01-07

## 2020-02-06 ENCOUNTER — OFFICE VISIT (OUTPATIENT)
Dept: FAMILY MEDICINE CLINIC | Facility: CLINIC | Age: 35
End: 2020-02-06
Payer: COMMERCIAL

## 2020-02-06 VITALS
OXYGEN SATURATION: 98 % | HEART RATE: 125 BPM | DIASTOLIC BLOOD PRESSURE: 70 MMHG | BODY MASS INDEX: 26.5 KG/M2 | TEMPERATURE: 98.6 F | SYSTOLIC BLOOD PRESSURE: 110 MMHG | WEIGHT: 144 LBS | HEIGHT: 62 IN | RESPIRATION RATE: 16 BRPM

## 2020-02-06 DIAGNOSIS — M79.7 FIBROMYALGIA: ICD-10-CM

## 2020-02-06 DIAGNOSIS — G43.109 MIGRAINE WITH AURA AND WITHOUT STATUS MIGRAINOSUS, NOT INTRACTABLE: ICD-10-CM

## 2020-02-06 DIAGNOSIS — Z00.00 ROUTINE ADULT HEALTH MAINTENANCE: ICD-10-CM

## 2020-02-06 DIAGNOSIS — I34.0 MODERATE MITRAL VALVE REGURGITATION: ICD-10-CM

## 2020-02-06 DIAGNOSIS — R63.5 WEIGHT GAIN: ICD-10-CM

## 2020-02-06 DIAGNOSIS — L98.9 SKIN LESION OF RIGHT LEG: ICD-10-CM

## 2020-02-06 DIAGNOSIS — E55.9 VITAMIN D DEFICIENCY: ICD-10-CM

## 2020-02-06 DIAGNOSIS — Z76.89 ENCOUNTER TO ESTABLISH CARE WITH NEW DOCTOR: Primary | ICD-10-CM

## 2020-02-06 DIAGNOSIS — I49.8 POTS (POSTURAL ORTHOSTATIC TACHYCARDIA SYNDROME): ICD-10-CM

## 2020-02-06 PROBLEM — M79.10 MYALGIA: Status: RESOLVED | Noted: 2019-09-06 | Resolved: 2020-02-06

## 2020-02-06 PROBLEM — G90.A POTS (POSTURAL ORTHOSTATIC TACHYCARDIA SYNDROME): Status: ACTIVE | Noted: 2020-02-06

## 2020-02-06 PROBLEM — G89.29 CHRONIC LOW BACK PAIN WITHOUT SCIATICA: Status: RESOLVED | Noted: 2019-09-06 | Resolved: 2020-02-06

## 2020-02-06 PROBLEM — R00.0 INAPPROPRIATE SINUS TACHYCARDIA: Status: RESOLVED | Noted: 2018-11-29 | Resolved: 2020-02-06

## 2020-02-06 PROBLEM — I47.11 INAPPROPRIATE SINUS TACHYCARDIA: Status: RESOLVED | Noted: 2018-11-29 | Resolved: 2020-02-06

## 2020-02-06 PROBLEM — M54.50 CHRONIC LOW BACK PAIN WITHOUT SCIATICA: Status: RESOLVED | Noted: 2019-09-06 | Resolved: 2020-02-06

## 2020-02-06 PROCEDURE — 3008F BODY MASS INDEX DOCD: CPT | Performed by: FAMILY MEDICINE

## 2020-02-06 PROCEDURE — 99385 PREV VISIT NEW AGE 18-39: CPT | Performed by: FAMILY MEDICINE

## 2020-02-06 NOTE — ASSESSMENT & PLAN NOTE
Seen by rheumatology, no underlying rheumatic issue  Recently started aquatherapy  Did not start Gabapentin due to concern for weight gain  Per chart review-SNRI is not a good option since it would worsen POTS, but SSRI is an option, pt not amenable to medications at this time

## 2020-02-06 NOTE — ASSESSMENT & PLAN NOTE
15 pounds in the last year, maybe related to increased fluid intake, but given sustained weight gain, check labs   Recent Gyn and hormonal labs were negative

## 2020-02-06 NOTE — ASSESSMENT & PLAN NOTE
Diagnosed after hospital stay in 2018, has seen Cardiology >1 year ago, multiple meds need to be avoided in her condition  Pt has not tolerated Metoprolol or Propranolol and didn't follow up with her Cardiologist  Advised since she is so symptomatic that she can't walk into a store without getting symptoms and is requesting a handicap placard, should re-establish with Cardiology and if they feel she needs a placard they can approve that, I did discuss with her that I would not be able to complete that for her based on my assessment today

## 2020-02-06 NOTE — ASSESSMENT & PLAN NOTE
Vit D, 25-Hydroxy   Date Value Ref Range Status   09/04/2019 18 5 (L) 30 0 - 100 0 ng/mL Final     Recheck levels

## 2020-02-06 NOTE — PROGRESS NOTES
FAMILY MEDICINE NEW PATIENT NOTE  Amador Downey 29 y o  female   DATE: February 6, 2020      ASSESSMENT and PLAN:  Amador Downey is a 29 y o  female here to establish care with:     POTS (postural orthostatic tachycardia syndrome)  Diagnosed after hospital stay in 2018, has seen Cardiology >1 year ago, multiple meds need to be avoided in her condition  Pt has not tolerated Metoprolol or Propranolol and didn't follow up with her Cardiologist  Advised since she is so symptomatic that she can't walk into a store without getting symptoms and is requesting a handicap placard, should re-establish with Cardiology and if they feel she needs a placard they can approve that, I did discuss with her that I would not be able to complete that for her based on my assessment today    Fibromyalgia  Seen by rheumatology, no underlying rheumatic issue  Recently started aquatherapy  Did not start Gabapentin due to concern for weight gain  Per chart review-SNRI is not a good option since it would worsen POTS, but SSRI is an option, pt not amenable to medications at this time    Migraine with aura and without status migrainosus, not intractable  Well controlled with Maxalt 10mg PRN per Neurology  Will consider starting Gabapentin after labs    Vitamin D deficiency  Vit D, 25-Hydroxy   Date Value Ref Range Status   09/04/2019 18 5 (L) 30 0 - 100 0 ng/mL Final     Recheck levels    Moderate mitral valve regurgitation  Per records from 2018: "Recent Echo revealed mildly myxomatous mitral valve with mild mitral valve prolapse and moderate regurgitation (this is mildly increased when compared to prior 2014 echo)"    May need repeat Echo, defer to Cardiology    Weight gain  15 pounds in the last year, maybe related to increased fluid intake, but given sustained weight gain, check labs  Recent Gyn and hormonal labs were negative    Skin lesion of right leg  Likely hyperpigmentation due to lichenification and chronic itching   Pt would prefer OTC treatments first, will do hydrocortisone BID x 2 weeks, if persists, will call in higher potency steroid  Can also do punch biopsy at patient request, declined today    Patient agreeable with the plan and expressed understanding  I discucssed signs and symptoms for which to RTC, go to ER or seek urgent medical care  SUBJECTIVE:  Keshia Camacho is a 29 y o  female who presents today with a chief complaint of Physical Exam     Pt is here to establish care  Previous PCP: Cherry Ray MD    PMH:  POTS- diagnosed in 2018, seeing Cardiologist then; tried midordrine in 2018; she is considering a handicap parking placard but not sure if she needs it  Sinus tachycardia- s/p ablation in 2014, tried and failed Metoprolol and Propranolol and had hypotension with both  MVR- per records, but Echo not on file   Fibromyalgia- saw Rheum and had negative work up, she has now started aquatherapy; never started taking Gabapentin  Migraine HA- Maxalt PRN per Neurology  Vit D def- completed course of high dose, now noncompliant with daily supplements    HM:  Pap- 12/2018  Labs: recent labs with Gyn  Imm: refuses vaccines because doesn't like how her body will react to it    Specialists:  OB/Gyn- last OV next month  Neurology  Cardiology and EPS- Dr Courtney Cho and Dr Fito Oliva >1 year ago    Acute Concerns:  Weight gain- 30 pounds in the last 1-2 years  Itchy lesion on RLE she wants examined    Review of Systems   Constitutional: Positive for unexpected weight change (15# in the last 1 year)  Negative for chills and fever  HENT: Negative for ear pain  Eyes: Negative for visual disturbance  Respiratory: Negative for cough and shortness of breath  Cardiovascular: Negative for chest pain and palpitations  Gastrointestinal: Negative for abdominal pain, diarrhea, nausea and vomiting  Musculoskeletal: Negative for arthralgias  Skin: Negative for rash  Neurological: Negative for headaches  Hematological: Does not bruise/bleed easily  I have reviewed the patient's PMH, Surgical History, Family History, Social History, Medication List and Allergies        Histories Reviewed and Updated 2020:  Patient's Medications   New Prescriptions    No medications on file   Previous Medications    ERGOCALCIFEROL (ERGOCALCIFEROL) 91789 UNITS CAPSULE    Take 1 capsule (50,000 Units total) by mouth once a week    PREVIDENT 5000 BOOSTER PLUS 1 1 % PSTE    Take 1 application by mouth daily Use as directed    RIZATRIPTAN (MAXALT) 10 MG TABLET    Take 1 tablet (10 mg total) by mouth once as needed for migraine May repeat every 2 hours if needed, max 30mg/24 hours   Modified Medications    No medications on file   Discontinued Medications    CHOLECALCIFEROL (VITAMIN D) 2000 UNITS CAPS    Take 1 capsule by mouth daily    CHOLECALCIFEROL (VITAMIN D3 ULTRA POTENCY) 1 25 MG (68696 UT) TABS    Take by mouth    CYANOCOBALAMIN (VITAMIN B-12) 1,000 MCG TABLET    Take 1,000 mcg by mouth daily    DEXAMETHASONE (DECADRON) 1 MG TABLET    At onset of a severe headache    GABAPENTIN (NEURONTIN) 100 MG CAPSULE    Take 1 capsule (100 mg total) by mouth daily at bedtime    PRENATAL MULTIVIT-MIN-FE-FA (PRE-CORAL FORMULA) TABS    Take 1 tablet by mouth daily     Allergies   Allergen Reactions    Pollen Extract      Past Medical History:   Diagnosis Date    Acute Lyme disease     Endometriosis     Endometriosis     Fibromyalgia     Mitral valve regurgitation     MVP (mitral valve prolapse)     POTS (postural orthostatic tachycardia syndrome)     PVC (premature ventricular contraction)     SVT (supraventricular tachycardia) (HCC)      Past Surgical History:   Procedure Laterality Date    ABLATION OF DYSRHYTHMIC FOCUS      DIAGNOSTIC LAPAROSCOPY      for endometriosis    LAPAROSCOPY FOR ECTOPIC PREGNANCY      WISDOM TOOTH EXTRACTION       Social History     Socioeconomic History    Marital status: /Civil Union Spouse name: Not on file    Number of children: 3    Years of education: Not on file    Highest education level: Not on file   Occupational History    Not on file   Social Needs    Financial resource strain: Not on file    Food insecurity:     Worry: Not on file     Inability: Not on file    Transportation needs:     Medical: Not on file     Non-medical: Not on file   Tobacco Use    Smoking status: Former Smoker     Last attempt to quit: 2013     Years since quittin 4    Smokeless tobacco: Never Used   Substance and Sexual Activity    Alcohol use: Yes     Frequency: Never     Drinks per session: 1 or 2     Binge frequency: Never     Comment: Socially/ Rarely    Drug use: No    Sexual activity: Yes     Partners: Male   Lifestyle    Physical activity:     Days per week: Not on file     Minutes per session: Not on file    Stress: Not on file   Relationships    Social connections:     Talks on phone: Not on file     Gets together: Not on file     Attends [employer] service: Not on file     Active member of club or organization: Not on file     Attends meetings of clubs or organizations: Not on file     Relationship status: Not on file    Intimate partner violence:     Fear of current or ex partner: Not on file     Emotionally abused: Not on file     Physically abused: Not on file     Forced sexual activity: Not on file   Other Topics Concern    Not on file   Social History Narrative    Not on file     Family History   Problem Relation Age of Onset    Hypertension Father     Cancer Paternal Grandfather     Sudden death Paternal Grandfather     Arthritis Mother     Arthritis Paternal Uncle     Rheum arthritis Paternal Uncle     Lupus Cousin        There is no immunization history on file for this patient       OBJECTIVE:  /70   Pulse (!) 125   Temp 98 6 °F (37 °C)   Resp 16   Ht 5' 2" (1 575 m)   Wt 65 3 kg (144 lb)   LMP 2020 (Exact Date)   SpO2 98%   Breastfeeding No   BMI 26 34 kg/m²   Physical Exam   Constitutional: She is oriented to person, place, and time  She appears well-developed and well-nourished  No distress  HENT:   Head: Normocephalic and atraumatic  Mouth/Throat: Oropharynx is clear and moist  No oropharyngeal exudate  Eyes: Pupils are equal, round, and reactive to light  EOM are normal  Right eye exhibits no discharge  Left eye exhibits no discharge  Neck: Normal range of motion  Neck supple  No JVD present  Cardiovascular: Normal rate, regular rhythm and normal heart sounds  No murmur heard  Pulmonary/Chest: Effort normal and breath sounds normal  No stridor  No respiratory distress  She has no wheezes  Abdominal: Soft  Bowel sounds are normal  There is no tenderness  There is no rebound and no guarding  Musculoskeletal: Normal range of motion  She exhibits no edema or tenderness  Neurological: She is alert and oriented to person, place, and time  Skin: Skin is warm and dry  She is not diaphoretic  No erythema  Psychiatric: She has a normal mood and affect  Her behavior is normal    Vitals reviewed  BMI Counseling: Body mass index is 26 34 kg/m²  The BMI is above normal  Nutrition recommendations include encouraging healthy choices of fruits and vegetables  Khalida Anne MD

## 2020-02-06 NOTE — ASSESSMENT & PLAN NOTE
Likely hyperpigmentation due to lichenification and chronic itching   Pt would prefer OTC treatments first, will do hydrocortisone BID x 2 weeks, if persists, will call in higher potency steroid  Can also do punch biopsy at patient request, declined today

## 2020-02-06 NOTE — ASSESSMENT & PLAN NOTE
Per records from 2018: "Recent Echo revealed mildly myxomatous mitral valve with mild mitral valve prolapse and moderate regurgitation (this is mildly increased when compared to prior 2014 echo)"    May need repeat Echo, defer to Cardiology

## 2020-03-12 DIAGNOSIS — E55.9 VITAMIN D DEFICIENCY: ICD-10-CM

## 2020-03-12 RX ORDER — ERGOCALCIFEROL 1.25 MG/1
CAPSULE ORAL
Qty: 4 CAPSULE | Refills: 5 | Status: SHIPPED | OUTPATIENT
Start: 2020-03-12 | End: 2020-11-08 | Stop reason: SDUPTHER

## 2020-08-15 DIAGNOSIS — E55.9 VITAMIN D DEFICIENCY: ICD-10-CM

## 2020-08-15 RX ORDER — ERGOCALCIFEROL 1.25 MG/1
CAPSULE ORAL
Qty: 4 CAPSULE | Refills: 5 | OUTPATIENT
Start: 2020-08-15

## 2020-08-15 NOTE — TELEPHONE ENCOUNTER
Please let patient know that if she completed 3 months of weekly high-dose Vit  D, she can go on to take daily over the counter Vit  D 1,000-2,000 units a day   Thanks, PRINCE

## 2020-11-03 NOTE — PROGRESS NOTES
Progress Note - Loretta Idamay 1985, 35 y o  female MRN: [id number]    Unit/Bed#: CW2 217-03 Encounter: [id number]    Primary Care Provider: Raina Watson MD   Date and time admitted to hospital: 11/29/2018  4:15 PM        * Chest pain   Assessment & Plan    · Unclear etiology  · Trop negative x3  · CXR negative  · ECG with TWI in Leads III and V1  · Appreciate cardiology consult  · No stress test indicated at this time  · Hold propanolol  · Telemetry  · Her outpatient Cardiologist was considering Cardiac MRI  · Ddimer negative     Inappropriate sinus tachycardia   Assessment & Plan    · Follows with Dr Cindi Peace, Dr Sherine Lares of [place] [address], and Dr Sunshine Ariza of [facility] as outpatient  · She is s/p SVT ablation in 2014  · She was intolerant to metoprolol succinate in the past (hypotension, lightheadedness) and could not take Bystolic (due to cost)   She was recently placed on 10mg daily propanolol as outpatient and has been compliant with this   · Will hold propanolol for now  · Suspect POTS  · Per cardiology liberalized salt intake   · Stay hydrated  · Encourage exercise program to increase stamina   · Continue to monitor telemetry overnight   · Encourage patient to get OOB and ambulate to see if HR elevates or patient becomes symptomatic     Vitamin D deficiency   Assessment & Plan    Continue supplementation      Moderate mitral valve regurgitation   Assessment & Plan    · Recent Echo revealed mildly myxomatous mitral valve with mild mitral valve prolapse and moderate regurgitation (this is mildly increased when compared to prior 2014 echo)     Impaired left ventricular function   Assessment & Plan    · Echo from November 16 2018 with LVEF 50% (was 45% prior per outpatient Cardiology records)  · Patient to follow up with PCP after discharge         VTE Pharmacologic Prophylaxis:   Pharmacologic: Pharmacologic VTE Prophylaxis contraindicated due to patient low rsk encourage ambulation  Mechanical VTE Prophylaxis in Place: Yes    Patient Centered Rounds: I have performed bedside rounds with nursing staff today  Discussions with Specialists or Other Care Team Provider: cardi[facility]    Education and Discussions with Family / Patient: patient    Time Spent for Care: 45 minutes  More than 50% of total time spent on counseling and coordination of care as described above  Current Length of Stay: 0 day(s)    Current Patient Status: Observation   Certification Statement: continues to require hospital stay for ongoing cardiac workup, awiating EP consult    Discharge Plan: home likely in the next 24 hours if medically stable    Code Status: Level 1 - Full Code      Subjective:   Reported she had squeezing pain left chest at home  She had one time stabbing pain since  No N/V  No SOB  She is concerned something is occurring with her heart  She denies extremity weakness or n/t  She denies dizziness  She reports headache  She voices frustration about her symptoms and lack of diagnosis and finding on diagnostic testing     Objective:     Vitals:   Temp (24hrs), Av 9 °F (36 6 °C), Min:97 7 °F (36 5 °C), Max:98 °F (36 7 °C)    Temp:  [97 7 °F (36 5 °C)-98 °F (36 7 °C)] 98 °F (36 7 °C)  HR:  [64-97] 77  Resp:  [12-33] 18  BP: ()/(50-78) 120/66  SpO2:  [95 %-100 %] 98 %  Body mass index is 23 35 kg/m²  Input and Output Summary (last 24 hours):     No intake or output data in the 24 hours ending 18 4549    Physical Exam:     Physical Exam   Constitutional: She is oriented to person, place, and time  She appears well-developed and well-nourished  No distress  HENT:   Head: Normocephalic and atraumatic  Mouth/Throat: Oropharynx is clear and moist    Neck: Normal range of motion  Neck supple  Cardiovascular: Normal rate, regular rhythm and intact distal pulses  Pulmonary/Chest: Effort normal and breath sounds normal  No respiratory distress     Abdominal: Soft  Bowel sounds are normal    Musculoskeletal: Normal range of motion  She exhibits no edema  Neurological: She is alert and oriented to person, place, and time  No cranial nerve deficit  Skin: Skin is warm and dry  Psychiatric: She has a normal mood and affect  Her behavior is normal    Vitals reviewed  Additional Data:     Labs:      Results from last 7 days  Lab Units 11/30/18 0410 11/29/18  1301   WBC Thousand/uL 7 58 8 16   HEMOGLOBIN g/dL 12 1 12 9   HEMATOCRIT % 35 6 39 6   PLATELETS Thousands/uL 195 206   NEUTROS PCT %  --  61   LYMPHS PCT %  --  32   MONOS PCT %  --  6   EOS PCT %  --  1       Results from last 7 days  Lab Units 11/30/18 0410 11/29/18  1301   POTASSIUM mmol/L 3 8 4 1   CHLORIDE mmol/L 106 105   CO2 mmol/L 26 26   BUN mg/dL 13 7   CREATININE mg/dL 0 73 0 73   CALCIUM mg/dL 9 0 9 4   ALK PHOS U/L  --  55   ALT U/L  --  20   AST U/L  --  11           * I Have Reviewed All Lab Data Listed Above  * Additional Pertinent Lab Tests Reviewed: Sridevi 66 Admission Reviewed    Imaging:    Imaging Reports Reviewed Today Include: CXR  Imaging Personally Reviewed by Myself Includes:  none    Recent Cultures (last 7 days):           Last 24 Hours Medication List:        Today, Patient Was Seen By: TAJ Joseph    ** Please Note: Dictation voice to text software may have been used in the creation of this document   ** I was present during the key portion of the procedure.

## 2020-11-06 ENCOUNTER — APPOINTMENT (OUTPATIENT)
Dept: LAB | Age: 35
End: 2020-11-06
Payer: COMMERCIAL

## 2020-11-06 DIAGNOSIS — R63.5 WEIGHT GAIN: ICD-10-CM

## 2020-11-06 DIAGNOSIS — Z00.00 ROUTINE ADULT HEALTH MAINTENANCE: ICD-10-CM

## 2020-11-06 DIAGNOSIS — E55.9 VITAMIN D DEFICIENCY: ICD-10-CM

## 2020-11-06 LAB
25(OH)D3 SERPL-MCNC: 24.6 NG/ML (ref 30–100)
ALBUMIN SERPL BCP-MCNC: 3.8 G/DL (ref 3.5–5)
ALP SERPL-CCNC: 70 U/L (ref 46–116)
ALT SERPL W P-5'-P-CCNC: 30 U/L (ref 12–78)
ANION GAP SERPL CALCULATED.3IONS-SCNC: 5 MMOL/L (ref 4–13)
AST SERPL W P-5'-P-CCNC: 17 U/L (ref 5–45)
BACTERIA UR QL AUTO: ABNORMAL /HPF
BASOPHILS # BLD AUTO: 0.04 THOUSANDS/ΜL (ref 0–0.1)
BASOPHILS NFR BLD AUTO: 0 % (ref 0–1)
BILIRUB SERPL-MCNC: 0.58 MG/DL (ref 0.2–1)
BILIRUB UR QL STRIP: NEGATIVE
BUN SERPL-MCNC: 8 MG/DL (ref 5–25)
CALCIUM SERPL-MCNC: 9.5 MG/DL (ref 8.3–10.1)
CHLORIDE SERPL-SCNC: 109 MMOL/L (ref 100–108)
CHOLEST SERPL-MCNC: 217 MG/DL (ref 50–200)
CLARITY UR: ABNORMAL
CO2 SERPL-SCNC: 24 MMOL/L (ref 21–32)
COLOR UR: ABNORMAL
CREAT SERPL-MCNC: 0.82 MG/DL (ref 0.6–1.3)
EOSINOPHIL # BLD AUTO: 0.11 THOUSAND/ΜL (ref 0–0.61)
EOSINOPHIL NFR BLD AUTO: 1 % (ref 0–6)
ERYTHROCYTE [DISTWIDTH] IN BLOOD BY AUTOMATED COUNT: 12.5 % (ref 11.6–15.1)
GFR SERPL CREATININE-BSD FRML MDRD: 93 ML/MIN/1.73SQ M
GLUCOSE P FAST SERPL-MCNC: 88 MG/DL (ref 65–99)
GLUCOSE UR STRIP-MCNC: NEGATIVE MG/DL
HCT VFR BLD AUTO: 43 % (ref 34.8–46.1)
HDLC SERPL-MCNC: 74 MG/DL
HGB BLD-MCNC: 14.5 G/DL (ref 11.5–15.4)
HGB UR QL STRIP.AUTO: NEGATIVE
IMM GRANULOCYTES # BLD AUTO: 0.04 THOUSAND/UL (ref 0–0.2)
IMM GRANULOCYTES NFR BLD AUTO: 0 % (ref 0–2)
KETONES UR STRIP-MCNC: NEGATIVE MG/DL
LDLC SERPL CALC-MCNC: 120 MG/DL (ref 0–100)
LEUKOCYTE ESTERASE UR QL STRIP: ABNORMAL
LYMPHOCYTES # BLD AUTO: 3.32 THOUSANDS/ΜL (ref 0.6–4.47)
LYMPHOCYTES NFR BLD AUTO: 34 % (ref 14–44)
MCH RBC QN AUTO: 30.4 PG (ref 26.8–34.3)
MCHC RBC AUTO-ENTMCNC: 33.7 G/DL (ref 31.4–37.4)
MCV RBC AUTO: 90 FL (ref 82–98)
MONOCYTES # BLD AUTO: 0.65 THOUSAND/ΜL (ref 0.17–1.22)
MONOCYTES NFR BLD AUTO: 7 % (ref 4–12)
MUCOUS THREADS UR QL AUTO: ABNORMAL
NEUTROPHILS # BLD AUTO: 5.52 THOUSANDS/ΜL (ref 1.85–7.62)
NEUTS SEG NFR BLD AUTO: 58 % (ref 43–75)
NITRITE UR QL STRIP: NEGATIVE
NON-SQ EPI CELLS URNS QL MICRO: ABNORMAL /HPF
NRBC BLD AUTO-RTO: 0 /100 WBCS
PH UR STRIP.AUTO: 6.5 [PH]
PLATELET # BLD AUTO: 256 THOUSANDS/UL (ref 149–390)
PMV BLD AUTO: 11.7 FL (ref 8.9–12.7)
POTASSIUM SERPL-SCNC: 4 MMOL/L (ref 3.5–5.3)
PROT SERPL-MCNC: 7.3 G/DL (ref 6.4–8.2)
PROT UR STRIP-MCNC: ABNORMAL MG/DL
RBC # BLD AUTO: 4.77 MILLION/UL (ref 3.81–5.12)
RBC #/AREA URNS AUTO: ABNORMAL /HPF
SODIUM SERPL-SCNC: 138 MMOL/L (ref 136–145)
SP GR UR STRIP.AUTO: 1.02 (ref 1–1.03)
TRIGL SERPL-MCNC: 114 MG/DL
TSH SERPL DL<=0.05 MIU/L-ACNC: 1.22 UIU/ML (ref 0.36–3.74)
UROBILINOGEN UR QL STRIP.AUTO: 0.2 E.U./DL
WBC # BLD AUTO: 9.68 THOUSAND/UL (ref 4.31–10.16)
WBC #/AREA URNS AUTO: ABNORMAL /HPF

## 2020-11-06 PROCEDURE — 80053 COMPREHEN METABOLIC PANEL: CPT

## 2020-11-06 PROCEDURE — 81001 URINALYSIS AUTO W/SCOPE: CPT | Performed by: FAMILY MEDICINE

## 2020-11-06 PROCEDURE — 36415 COLL VENOUS BLD VENIPUNCTURE: CPT

## 2020-11-06 PROCEDURE — 82306 VITAMIN D 25 HYDROXY: CPT

## 2020-11-06 PROCEDURE — 85025 COMPLETE CBC W/AUTO DIFF WBC: CPT

## 2020-11-06 PROCEDURE — 80061 LIPID PANEL: CPT

## 2020-11-06 PROCEDURE — 84443 ASSAY THYROID STIM HORMONE: CPT

## 2020-11-08 DIAGNOSIS — E55.9 VITAMIN D DEFICIENCY: ICD-10-CM

## 2020-11-09 RX ORDER — ERGOCALCIFEROL 1.25 MG/1
50000 CAPSULE ORAL WEEKLY
Qty: 4 CAPSULE | Refills: 0 | Status: SHIPPED | OUTPATIENT
Start: 2020-11-09 | End: 2021-01-28 | Stop reason: ALTCHOICE

## 2020-11-29 DIAGNOSIS — E55.9 VITAMIN D DEFICIENCY: ICD-10-CM

## 2020-11-29 RX ORDER — ERGOCALCIFEROL 1.25 MG/1
CAPSULE ORAL
Qty: 4 CAPSULE | Refills: 0 | OUTPATIENT
Start: 2020-11-29

## 2020-12-28 ENCOUNTER — ANNUAL EXAM (OUTPATIENT)
Dept: OBGYN CLINIC | Facility: CLINIC | Age: 35
End: 2020-12-28
Payer: COMMERCIAL

## 2020-12-28 VITALS
HEART RATE: 97 BPM | HEIGHT: 62 IN | WEIGHT: 145.6 LBS | SYSTOLIC BLOOD PRESSURE: 122 MMHG | DIASTOLIC BLOOD PRESSURE: 80 MMHG | TEMPERATURE: 97.3 F | BODY MASS INDEX: 26.79 KG/M2

## 2020-12-28 DIAGNOSIS — Z01.419 ENCOUNTER FOR GYNECOLOGICAL EXAMINATION (GENERAL) (ROUTINE) WITHOUT ABNORMAL FINDINGS: Primary | ICD-10-CM

## 2020-12-28 DIAGNOSIS — Z11.51 ENCOUNTER FOR SCREENING FOR HUMAN PAPILLOMAVIRUS (HPV): ICD-10-CM

## 2020-12-28 DIAGNOSIS — Z12.4 CERVICAL CANCER SCREENING: ICD-10-CM

## 2020-12-28 DIAGNOSIS — F32.81 PMDD (PREMENSTRUAL DYSPHORIC DISORDER): ICD-10-CM

## 2020-12-28 DIAGNOSIS — Z11.3 SCREENING EXAMINATION FOR STD (SEXUALLY TRANSMITTED DISEASE): ICD-10-CM

## 2020-12-28 PROCEDURE — S0612 ANNUAL GYNECOLOGICAL EXAMINA: HCPCS | Performed by: OBSTETRICS & GYNECOLOGY

## 2020-12-28 PROCEDURE — 87591 N.GONORRHOEAE DNA AMP PROB: CPT | Performed by: OBSTETRICS & GYNECOLOGY

## 2020-12-28 PROCEDURE — G0145 SCR C/V CYTO,THINLAYER,RESCR: HCPCS | Performed by: OBSTETRICS & GYNECOLOGY

## 2020-12-28 PROCEDURE — 87491 CHLMYD TRACH DNA AMP PROBE: CPT | Performed by: OBSTETRICS & GYNECOLOGY

## 2020-12-28 PROCEDURE — 87624 HPV HI-RISK TYP POOLED RSLT: CPT | Performed by: OBSTETRICS & GYNECOLOGY

## 2020-12-28 RX ORDER — FLUOXETINE 10 MG/1
10 CAPSULE ORAL DAILY
COMMUNITY
End: 2021-01-28

## 2020-12-29 LAB
C TRACH DNA SPEC QL NAA+PROBE: NEGATIVE
N GONORRHOEA DNA SPEC QL NAA+PROBE: NEGATIVE

## 2020-12-30 LAB
HPV HR 12 DNA CVX QL NAA+PROBE: NEGATIVE
HPV16 DNA CVX QL NAA+PROBE: NEGATIVE
HPV18 DNA CVX QL NAA+PROBE: NEGATIVE
LAB AP GYN PRIMARY INTERPRETATION: NORMAL
Lab: NORMAL

## 2021-01-07 DIAGNOSIS — E55.9 VITAMIN D DEFICIENCY: ICD-10-CM

## 2021-01-07 RX ORDER — ERGOCALCIFEROL 1.25 MG/1
CAPSULE ORAL
Qty: 4 CAPSULE | Refills: 0 | OUTPATIENT
Start: 2021-01-07

## 2021-01-19 ENCOUNTER — LAB (OUTPATIENT)
Dept: LAB | Facility: CLINIC | Age: 36
End: 2021-01-19
Payer: COMMERCIAL

## 2021-01-19 DIAGNOSIS — Z11.3 SCREENING EXAMINATION FOR STD (SEXUALLY TRANSMITTED DISEASE): ICD-10-CM

## 2021-01-19 LAB
HBV SURFACE AG SER QL: NORMAL
HCV AB SER QL: NORMAL

## 2021-01-19 PROCEDURE — 36415 COLL VENOUS BLD VENIPUNCTURE: CPT

## 2021-01-19 PROCEDURE — 86696 HERPES SIMPLEX TYPE 2 TEST: CPT

## 2021-01-19 PROCEDURE — 86803 HEPATITIS C AB TEST: CPT

## 2021-01-19 PROCEDURE — 86592 SYPHILIS TEST NON-TREP QUAL: CPT

## 2021-01-19 PROCEDURE — 87389 HIV-1 AG W/HIV-1&-2 AB AG IA: CPT

## 2021-01-19 PROCEDURE — 86695 HERPES SIMPLEX TYPE 1 TEST: CPT

## 2021-01-19 PROCEDURE — 87340 HEPATITIS B SURFACE AG IA: CPT

## 2021-01-20 LAB
HIV 1+2 AB+HIV1 P24 AG SERPL QL IA: NORMAL
HSV1 IGG SER IA-ACNC: <0.91 INDEX (ref 0–0.9)
HSV2 IGG SER IA-ACNC: <0.91 INDEX (ref 0–0.9)
RPR SER QL: NORMAL

## 2021-01-28 ENCOUNTER — OFFICE VISIT (OUTPATIENT)
Dept: FAMILY MEDICINE CLINIC | Facility: CLINIC | Age: 36
End: 2021-01-28
Payer: COMMERCIAL

## 2021-01-28 VITALS
BODY MASS INDEX: 27.23 KG/M2 | OXYGEN SATURATION: 99 % | WEIGHT: 148 LBS | DIASTOLIC BLOOD PRESSURE: 76 MMHG | SYSTOLIC BLOOD PRESSURE: 112 MMHG | RESPIRATION RATE: 18 BRPM | HEIGHT: 62 IN | HEART RATE: 114 BPM | TEMPERATURE: 97.3 F

## 2021-01-28 DIAGNOSIS — R63.5 WEIGHT GAIN: ICD-10-CM

## 2021-01-28 DIAGNOSIS — E55.9 VITAMIN D INSUFFICIENCY: ICD-10-CM

## 2021-01-28 DIAGNOSIS — M79.7 FIBROMYALGIA: ICD-10-CM

## 2021-01-28 DIAGNOSIS — R21 SKIN ERUPTION: ICD-10-CM

## 2021-01-28 DIAGNOSIS — Z00.00 ANNUAL PHYSICAL EXAM: ICD-10-CM

## 2021-01-28 DIAGNOSIS — I49.8 POTS (POSTURAL ORTHOSTATIC TACHYCARDIA SYNDROME): Primary | ICD-10-CM

## 2021-01-28 PROCEDURE — 11104 PUNCH BX SKIN SINGLE LESION: CPT | Performed by: FAMILY MEDICINE

## 2021-01-28 PROCEDURE — 88313 SPECIAL STAINS GROUP 2: CPT | Performed by: STUDENT IN AN ORGANIZED HEALTH CARE EDUCATION/TRAINING PROGRAM

## 2021-01-28 PROCEDURE — 99395 PREV VISIT EST AGE 18-39: CPT | Performed by: FAMILY MEDICINE

## 2021-01-28 PROCEDURE — 88305 TISSUE EXAM BY PATHOLOGIST: CPT | Performed by: STUDENT IN AN ORGANIZED HEALTH CARE EDUCATION/TRAINING PROGRAM

## 2021-01-28 NOTE — ASSESSMENT & PLAN NOTE
She had previously been on prescription dose vitamin-D, most recent vitamin-D low but not in deficient range  Recommended that she supplement with 2000 International Units of vitamin D3 daily

## 2021-01-28 NOTE — PROGRESS NOTES
[facility]NUAL PHYSICAL      Date of Service: 21  Primary Care Provider:   Mitch Anne MD     Name: Sebastian Mcgarry       : 1985       Age:35 y o  Sex: female      MRN: [id number]      Chief Complaint:Annual Exam (Yearly physical)     Assessment and Plan:  28 y o  female exam      1  Health Maintenance  - Cervical Cancer Screening: normal PAP, negative HPV 2020  - Labs: done prior   - Immunizations: Reviewed  Recommend yearly flu vaccine  2  Other diagnoses addressed today:   Problem List Items Addressed This Visit        Cardiovascular and Mediastinum    POTS (postural orthostatic tachycardia syndrome) - Primary     Patient does struggle with her symptoms of POTS but feels like she manages well overall  Reviewed importance of regular physical activity, recommended resistance for strength training over aerobic exercise if she feels tired or too symptomatic with aerobic exercise  Other    Vitamin D insufficiency     She had previously been on prescription dose vitamin-D, most recent vitamin-D low but not in deficient range  Recommended that she supplement with 2000 International Units of vitamin D3 daily  Fibromyalgia     Seen by rheumatology, no underlying rheumatic issue  She has tried to be physically active with function of pulling doing aqua therapy  Is considering trying CBD or medical marijuana  Per chart review-SNRI is not a good option since it would worsen POTS, but SSRI is an option, pt not amenable to medications at this time         Weight gain      Other Visit Diagnoses     Skin eruption        Relevant Orders    Tissue Exam         Biopsy    Date/Time: 2021 3:04 PM  Performed by: Mitch Anne MD  Authorized by: Mitch Anne MD   Universal Protocol:  Consent: Verbal consent obtained    Risks and benefits: risks, benefits and alternatives were discussed  Consent given by: patient  Time out: Immediately prior to procedure a "time out" was called to verify the correct patient, procedure, equipment, support staff and site/side marked as required  Patient understanding: patient states understanding of the procedure being performed  Patient consent: the patient's understanding of the procedure matches consent given  Procedure consent: procedure consent matches procedure scheduled  Relevant documents: relevant documents not present or verified  Test results: test results not available  Site marked: the operative site was marked  Radiology Images displayed and confirmed  If images not available, report reviewed: imaging studies not available  Required items: required blood products, implants, devices, and special equipment available  Patient identity confirmed: verbally with patient      Procedure Details - Skin Biopsy:     Biopsy tissue type: skin    Biopsy method: punch biopsy      Body area:  Lower extremity    Lower extremity location:  R lower leg    Malignancy: benign lesion       Pathology benign, non-specific  Immunizations and preventive care screenings were discussed with patient today  Appropriate education was printed on patient's after visit summary  Counseling:  · Alcohol/drug use: discussed moderation in alcohol intake, the recommendations for healthy alcohol use, and avoidance of illicit drug use  · Dental Health: discussed importance of regular tooth brushing, flossing, and dental visits  · Injury prevention: discussed safety/seat belts, safety helmets, smoke detectors, carbon dioxide detectors    · Exercise: the importance of regular exercise/physical activity was discussed  Recommend exercise 3-5 times per week for at least 30 minutes  BMI Counseling: Body mass index is 26 85 kg/m²   The BMI is above normal  Nutrition recommendations include encouraging healthy choices of fruits and vegetables, consuming healthier snacks, moderation in carbohydrate intake, increasing intake of lean protein and reducing intake of saturated and trans fat  Exercise recommendations include exercising 3-5 times per week and strength training exercises  Follow up next physical in 1 year  Subjective:    Jeovanny Chandra is a 28 y o  female and is here for a comprehensive physical exam      She has seen a number of specialists including cardiology for POTS, rheumatology for fibromyalgia, neurology for migraines     Acute Complaints:     Skin Lesion  On her left shin  It does itch, she will use cortisone cream on the area  This has been present for over 2 years, has not really changed  The lesion is very difficult to see however she describes it as darkening of the skin with white spots "     Fibromyalgia  She has seen rheumatology, she had negative rheumatologic work-up in 2019 aside from low vitamin D    POTS  Diagnosed in 2018, was previously on beta blockers but did not tolerate the medications  She underwent ablation for PVCs in 2014  She limits caffeine intake, tries to eat a healthy diet  She feels like because her heart rate is always high she is always having to push herself  She does notice worsening with her POTS symptoms around her menstrual cycle  Anxiety/Depression  She is no longer taking Prozac, she was taking it very inconsistently so she recommended to be helpful  Diet and Physical Activity  · Diet/Nutrition: does follow a well balanced diet  · Exercise: no formal exercise  Tries to exercise at the pool  General Health  · Vision: no vision problems and goes for regular eye exams  · Dental: regular dental visits  Gyn Health: Follows GYN  Patient's last menstrual period was 01/23/2021  History of sexually transmitted infection No  History of abnormal pap smears  Yes remote, most recent PAP normal   Patient is currently sexually active  heterosexual Birth control: none       Histories Updated and Reviewed 1/28/2021:  Patient's Medications   New Prescriptions    No medications on file Previous Medications    ASCORBIC ACID (VITAMIN C PO)    Take by mouth    BIOTIN PO    Take by mouth    CALCIUM PO    Take by mouth    CYANOCOBALAMIN (VITAMIN B-12 PO)    Take by mouth    MAGNESIUM PO    Take by mouth    PREVIDENT 5000 BOOSTER PLUS 1 1 % PSTE    Take 1 application by mouth daily Use as directed    RIZATRIPTAN (MAXALT) 10 MG TABLET    Take 1 tablet (10 mg total) by mouth once as needed for migraine May repeat every 2 hours if needed, max 30mg/24 hours   Modified Medications    No medications on file   Discontinued Medications    ERGOCALCIFEROL (VITAMIN D2) 50,000 UNITS    Take 1 capsule (50,000 Units total) by mouth once a week    FLUOXETINE (PROZAC) 10 MG CAPSULE    Take 10 mg by mouth daily     Allergies   Allergen Reactions    Pollen Extract      Past Medical History:   Diagnosis Date    Acute Lyme disease     Endometriosis     Endometriosis     Fibromyalgia     Mitral valve regurgitation     MVP (mitral valve prolapse)     POTS (postural orthostatic tachycardia syndrome)     PVC (premature ventricular contraction)     SVT (supraventricular tachycardia) ([facility])      Social History     Socioeconomic History    Marital status: /Civil Union     Spouse name: Not on file    Number of children: 3    Years of education: Not on file    Highest education level: Not on file   Occupational History    Not on file   Social Needs    Financial resource strain: Not on file    Food insecurity     Worry: Not on file     Inability: Not on file   [employer] needs     Medical: Not on file     Non-medical: Not on file   Tobacco Use    Smoking status: Former Smoker     Quit date: 2013     Years since quittin 4    Smokeless tobacco: Never Used   Substance and Sexual Activity    Alcohol use: Yes     Frequency: Never     Drinks per session: 1 or 2     Binge frequency: Never     Comment: Socially/ Rarely    Drug use: No    Sexual activity: Yes     Partners: Male     Birth control/protection: None   Lifestyle    Physical activity     Days per week: Not on file     Minutes per session: Not on file    Stress: Not on file   Relationships    Social connections     Talks on phone: Not on file     Gets together: Not on file     Attends Yazidism service: Not on file     Active member of club or organization: Not on file     Attends meetings of clubs or organizations: Not on file     Relationship status: Not on file    Intimate partner violence     Fear of current or ex partner: Not on file     Emotionally abused: Not on file     Physically abused: Not on file     Forced sexual activity: Not on file   Other Topics Concern    Not on file   Social History Narrative    Not on file       There is no immunization history on file for this patient  Objective:  /76 (BP Location: Left arm, Patient Position: Sitting, Cuff Size: Standard)   Pulse (!) 114   Temp (!) 97 3 °F (36 3 °C)   Resp 18   Ht 5' 2 25" (1 581 m)   Wt 67 1 kg (148 lb)   LMP 01/23/2021   SpO2 99%   Breastfeeding No   BMI 26 85 kg/m²   BP Readings from Last 3 Encounters:   01/28/21 112/76   12/28/20 122/80   02/06/20 110/70      Wt Readings from Last 3 Encounters:   01/28/21 67 1 kg (148 lb)   12/28/20 66 kg (145 lb 9 6 oz)   02/06/20 65 3 kg (144 lb)      Physical Exam  Constitutional:       General: She is not in acute distress  Appearance: Normal appearance  She is normal weight  She is not ill-appearing or toxic-appearing  HENT:      Head: Normocephalic and atraumatic  Right Ear: External ear normal       Left Ear: External ear normal       Nose: Nose normal       Mouth/Throat:      Mouth: Mucous membranes are moist    Eyes:      Extraocular Movements: Extraocular movements intact  Conjunctiva/sclera: Conjunctivae normal    Neck:      Musculoskeletal: Normal range of motion and neck supple  Pulmonary:      Effort: Pulmonary effort is normal  No respiratory distress     Musculoskeletal: Normal range of motion  Skin:     Findings: No erythema or rash  Neurological:      General: No focal deficit present  Mental Status: She is alert and oriented to person, place, and time  Psychiatric:         Mood and Affect: Mood normal          Behavior: Behavior normal            Lab Results   Component Value Date    WBC 9 68 11/06/2020    HGB 14 5 11/06/2020    HCT 43 0 11/06/2020    MCV 90 11/06/2020     11/06/2020     Lab Results   Component Value Date     11/27/2014    SODIUM 138 11/06/2020    K 4 0 11/06/2020     (H) 11/06/2020    CO2 24 11/06/2020    ANIONGAP 13 11/27/2014    AGAP 5 11/06/2020    BUN 8 11/06/2020    CREATININE 0 82 11/06/2020    GLUC 85 12/07/2018    GLUF 88 11/06/2020    CALCIUM 9 5 11/06/2020    AST 17 11/06/2020    ALT 30 11/06/2020    ALKPHOS 70 11/06/2020    PROT 6 6 11/27/2014    TP 7 3 11/06/2020    BILITOT 0 3 11/27/2014    TBILI 0 58 11/06/2020    EGFR 93 11/06/2020     Lab Results   Component Value Date    XGA0XMQJZCNX 1 220 11/06/2020     Lab Results   Component Value Date    CHOLESTEROL 217 (H) 11/06/2020     Lab Results   Component Value Date    HDL 74 11/06/2020     Lab Results   Component Value Date    TRIG 114 11/06/2020     No results found for: Jaz  Lab Results   Component Value Date    LDLCALC 120 (H) 11/06/2020     Vit D, 25-Hydroxy   Date Value Ref Range Status   11/06/2020 24 6 (L) 30 0 - 100 0 ng/mL Final         Patient Care Team:  Dorinda Kauffman MD as PCP - General (Family Medicine)  MD Rudy Marrero MD Mylo Sequin, MD DAVID Schultz DO Milus Kemp, MD Tita Spangle, MD Ines Reels, MD    Note: Portions of the record may have been created with voice recognition software  Occasional wrong word or "sound a like" substitutions may have occurred due to the inherent limitations of voice recognition software   Read the chart carefully and recognize, using context, where substitutions have occurred

## 2021-01-28 NOTE — ASSESSMENT & PLAN NOTE
Seen by rheumatology, no underlying rheumatic issue  She has tried to be physically active with function of pulling doing aqua therapy  Is considering trying CBD or medical marijuana    Per chart review-SNRI is not a good option since it would worsen POTS, but SSRI is an option, pt not amenable to medications at this time

## 2021-01-28 NOTE — ASSESSMENT & PLAN NOTE
Patient does struggle with her symptoms of POTS but feels like she manages well overall  Reviewed importance of regular physical activity, recommended resistance for strength training over aerobic exercise if she feels tired or too symptomatic with aerobic exercise

## 2022-02-23 ENCOUNTER — APPOINTMENT (OUTPATIENT)
Dept: LAB | Facility: MEDICAL CENTER | Age: 37
End: 2022-02-23
Payer: COMMERCIAL

## 2023-06-21 NOTE — PROGRESS NOTES
YADIRA 10 2016         RE: Jackie Pedraza                                   To: [facility]   MR#: [id number]                                   [address]   : [address]                                   [address]   ENC: [id number]                             [place], [address] [facility]    (Exam #: [id number])                           Fax: [phone]      The LMP of this 27year old,  G5, P2-0-1-2 patient was 2015, giving   her an SHUBHAM of AUG 26 2016 and a current gestational age of 33 weeks 0 days   by dates  The patient has a history of one ectopic pregnancy  A   sonographic examination was performed on YADIRA 10 2016 using real time   equipment  The ultrasound examination was performed using abdominal   technique  The patient has a BMI of 26 1  Her blood pressure today was   112/70  Earliest ultrasound found in her record:  2016   9w 1d    SHUBHAM      Lisette reports regular fetal movements and denies problems related to   hypertension, gestational diabetes,  labor, or vaginal bleeding  Madhu Ansari is referred for the indication of clinical size less than dates        Cardiac motion was observed at 133 bpm       INDICATIONS      fetal growth   size less than dates      Exam Types      Level I      RESULTS      Fetus # 1 of 1   Vertex presentation   Fetal growth appeared normal   Placenta Location = Anterior, fundal   No placenta previa   Placenta Grade = II      MEASUREMENTS (* Included In Average GA)      AC              24 5 cm        28 weeks 5 days* (40%)   BPD              7 5 cm        30 weeks 0 days* (64%)   HC              27 0 cm        29 weeks 1 day * (40%)   Femur            5 3 cm        28 weeks 2 days* (28%)      Cerebellum       3 5 cm        30 weeks 5 days      HC/AC           1 11   FL/AC           0 22   FL/BPD          0 71   EFW (Ac/Fl/Hc)  1267 grams - 2 lbs 13 oz                 (42%)      THE AVERAGE GESTATIONAL AGE is 29 weeks 1 day +/- 18 days       AMNIOTIC FLUID      Q1: 6 3      Q2: 4 1      Q3: 4 6      Q4: 1 6   ARI Total = 16 5 cm   Amniotic Fluid: Normal      IMPRESSION      [place] IUP   29 weeks and 1 day by this ultrasound  (SHUBHAM=AUG 25 2016)   Vertex presentation   Fetal growth appeared normal   Regular fetal heart rate of 133 bpm   Anterior, fundal placenta   No placenta previa      GENERAL COMMENT      No fetal structural abnormality is identified on the Level I survey today  The fetal brain, heart including four-chamber, short axis, and outflow   tract views, stomach, kidneys, bladder, three vessel cord, diaphragm, and   face appear normal   Fetal interval growth and amniotic fluid volume are   normal       Today's ultrasound findings and suggested follow-up were discussed in   detail with Lisette  Daily third trimester fetal kick counting was discussed   at the visit today  No further appointment has been scheduled in the   [facility]  for Ambrose Ghosh at this time  Follow-up [facility] ultrasound   evaluation is recommended as clinically indicated  The face to face time, in addition to time spent discussing ultrasound   results, was 10 minutes, greater than 50% of which was spent during   counseling and coordination of care  [address] [place] [address],[address] [facility]  ALEM Yousif     Maternal-Fetal Medicine   Electronically signed 06/10/16 12:12 Eucrisa Counseling: Patient may experience a mild burning sensation during topical application. Eucrisa is not approved in children less than 2 years of age.

## 2023-10-10 ENCOUNTER — OFFICE VISIT (OUTPATIENT)
Dept: OBGYN CLINIC | Facility: CLINIC | Age: 38
End: 2023-10-10

## 2023-10-10 VITALS
BODY MASS INDEX: 25.22 KG/M2 | DIASTOLIC BLOOD PRESSURE: 72 MMHG | HEIGHT: 61 IN | HEART RATE: 89 BPM | SYSTOLIC BLOOD PRESSURE: 124 MMHG | WEIGHT: 133.6 LBS

## 2023-10-10 DIAGNOSIS — Z12.4 CERVICAL CANCER SCREENING: ICD-10-CM

## 2023-10-10 DIAGNOSIS — N63.10 BILATERAL BREAST LUMP: ICD-10-CM

## 2023-10-10 DIAGNOSIS — N63.25 MASS OVERLAPPING MULTIPLE QUADRANTS OF LEFT BREAST: ICD-10-CM

## 2023-10-10 DIAGNOSIS — Z01.419 ENCNTR FOR GYN EXAM (GENERAL) (ROUTINE) W/O ABN FINDINGS: Primary | ICD-10-CM

## 2023-10-10 DIAGNOSIS — Z11.3 SCREEN FOR STD (SEXUALLY TRANSMITTED DISEASE): ICD-10-CM

## 2023-10-10 DIAGNOSIS — R10.2 PELVIC PAIN: ICD-10-CM

## 2023-10-10 DIAGNOSIS — N63.20 BILATERAL BREAST LUMP: ICD-10-CM

## 2023-10-10 DIAGNOSIS — N63.15 MASS OVERLAPPING MULTIPLE QUADRANTS OF RIGHT BREAST: ICD-10-CM

## 2023-10-10 DIAGNOSIS — Z20.2 EXPOSURE TO STD: ICD-10-CM

## 2023-10-10 PROCEDURE — 87591 N.GONORRHOEAE DNA AMP PROB: CPT | Performed by: OBSTETRICS & GYNECOLOGY

## 2023-10-10 PROCEDURE — 87661 TRICHOMONAS VAGINALIS AMPLIF: CPT | Performed by: OBSTETRICS & GYNECOLOGY

## 2023-10-10 PROCEDURE — 87491 CHLMYD TRACH DNA AMP PROBE: CPT | Performed by: OBSTETRICS & GYNECOLOGY

## 2023-10-10 PROCEDURE — G0476 HPV COMBO ASSAY CA SCREEN: HCPCS | Performed by: OBSTETRICS & GYNECOLOGY

## 2023-10-10 PROCEDURE — G0145 SCR C/V CYTO,THINLAYER,RESCR: HCPCS | Performed by: OBSTETRICS & GYNECOLOGY

## 2023-10-10 NOTE — PATIENT INSTRUCTIONS
Wellness Visit for Adults   AMBULATORY CARE:   A wellness visit  is when you see your healthcare provider to get screened for health problems. Your healthcare provider will also give you advice on how to stay healthy. Write down your questions so you remember to ask them. Ask your healthcare provider how often you should have a wellness visit. What happens at a wellness visit:  Your healthcare provider will ask about your health, and your family history of health problems. This includes high blood pressure, heart disease, and cancer. He or she will ask if you have symptoms that concern you, if you smoke, and about your mood. You may also be asked about your intake of medicines, supplements, food, and alcohol. Any of the following may be done: Your weight  will be checked. Your height may also be checked so your body mass index (BMI) can be calculated. Your BMI shows if you are at a healthy weight. Your blood pressure  and heart rate will be checked. Your temperature may also be checked. Blood and urine tests  may be done. Blood tests may be done to check your cholesterol levels. Abnormal cholesterol levels increase your risk for heart disease and stroke. You may also need a blood or urine test to check for diabetes if you are at increased risk. Urine tests may be done to look for signs of an infection or kidney disease. A physical exam  includes checking your heartbeat and lungs with a stethoscope. Your healthcare provider may also check your skin to look for sun damage. Screening tests  may be recommended. A screening test is done to check for diseases that may not cause symptoms. The screening tests you may need depend on your age, gender, family history, and lifestyle habits. For example, colorectal screening may be recommended if you are 48years old or older. Screening tests you need if you are a woman:   A Pap smear  is used to screen for cervical cancer.  Pap smears are usually done every 3 to 5 years depending on your age. You may need them more often if you have had abnormal Pap smear test results in the past. Ask your healthcare provider how often you should have a Pap smear. A mammogram  is an x-ray of your breasts to screen for breast cancer. Experts recommend mammograms every 2 years starting at age 48 years. You may need a mammogram at age 52 years or younger if you have an increased risk for breast cancer. Talk to your healthcare provider about when you should start having mammograms and how often you need them. Vaccines you may need:   Get an influenza vaccine  every year. The influenza vaccine protects you from the flu. Several types of viruses cause the flu. The viruses change over time, so new vaccines are made each year. Get a tetanus-diphtheria (Td) booster vaccine  every 10 years. This vaccine protects you against tetanus and diphtheria. Tetanus is a severe infection that may cause painful muscle spasms and lockjaw. Diphtheria is a severe bacterial infection that causes a thick covering in the back of your mouth and throat. Get a human papillomavirus (HPV) vaccine  if you are female and aged 23 to 32 or male 23 to 24 and never received it. This vaccine protects you from HPV infection. HPV is the most common infection spread by sexual contact. HPV may also cause vaginal, penile, and anal cancers. Get a pneumococcal vaccine  if you are aged 72 years or older. The pneumococcal vaccine is an injection given to protect you from pneumococcal disease. Pneumococcal disease is an infection caused by pneumococcal bacteria. The infection may cause pneumonia, meningitis, or an ear infection. Get a shingles vaccine  if you are 60 or older, even if you have had shingles before. The shingles vaccine is an injection to protect you from the varicella-zoster virus. This is the same virus that causes chickenpox.  Shingles is a painful rash that develops in people who had chickenpox or have been exposed to the virus. How to eat healthy:  My Plate is a model for planning healthy meals. It shows the types and amounts of foods that should go on your plate. Fruits and vegetables make up about half of your plate, and grains and protein make up the other half. A serving of dairy is included on the side of your plate. The amount of calories and serving sizes you need depends on your age, gender, weight, and height. Examples of healthy foods are listed below:  Eat a variety of vegetables  such as dark green, red, and orange vegetables. You can also include canned vegetables low in sodium (salt) and frozen vegetables without added butter or sauces. Eat a variety of fresh fruits , canned fruit in 100% juice, frozen fruit, and dried fruit. Include whole grains. At least half of the grains you eat should be whole grains. Examples include whole-wheat bread, wheat pasta, brown rice, and whole-grain cereals such as oatmeal.    Eat a variety of protein foods such as seafood (fish and shellfish), lean meat, and poultry without skin (turkey and chicken). Examples of lean meats include pork leg, shoulder, or tenderloin, and beef round, sirloin, tenderloin, and extra lean ground beef. Other protein foods include eggs and egg substitutes, beans, peas, soy products, nuts, and seeds. Choose low-fat dairy products such as skim or 1% milk or low-fat yogurt, cheese, and cottage cheese. Limit unhealthy fats  such as butter, hard margarine, and shortening. Exercise:  Exercise at least 30 minutes per day on most days of the week. Some examples of exercise include walking, biking, dancing, and swimming. You can also fit in more physical activity by taking the stairs instead of the elevator or parking farther away from stores. Include muscle strengthening activities 2 days each week. Regular exercise provides many health benefits.  It helps you manage your weight, and decreases your risk for type 2 diabetes, heart disease, stroke, and high blood pressure. Exercise can also help improve your mood. Ask your healthcare provider about the best exercise plan for you. General health and safety guidelines:   Do not smoke. Nicotine and other chemicals in cigarettes and cigars can cause lung damage. Ask your healthcare provider for information if you currently smoke and need help to quit. E-cigarettes or smokeless tobacco still contain nicotine. Talk to your healthcare provider before you use these products. Limit alcohol. A drink of alcohol is 12 ounces of beer, 5 ounces of wine, or 1½ ounces of liquor. Lose weight, if needed. Being overweight increases your risk of certain health conditions. These include heart disease, high blood pressure, type 2 diabetes, and certain types of cancer. Protect your skin. Do not sunbathe or use tanning beds. Use sunscreen with a SPF 15 or higher. Apply sunscreen at least 15 minutes before you go outside. Reapply sunscreen every 2 hours. Wear protective clothing, hats, and sunglasses when you are outside. Drive safely. Always wear your seatbelt. Make sure everyone in your car wears a seatbelt. A seatbelt can save your life if you are in an accident. Do not use your cell phone when you are driving. This could distract you and cause an accident. Pull over if you need to make a call or send a text message. Practice safe sex. Use latex condoms if are sexually active and have more than one partner. Your healthcare provider may recommend screening tests for sexually transmitted infections (STIs). Wear helmets, lifejackets, and protective gear. Always wear a helmet when you ride a bike or motorcycle, go skiing, or play sports that could cause a head injury. Wear protective equipment when you play sports. Wear a lifejacket when you are on a boat or doing water sports.     © Copyright [facility] 2023 Information is for End User's use only and may not be sold, redistributed or otherwise used for commercial purposes. The above information is an  only. It is not intended as medical advice for individual conditions or treatments. Talk to your doctor, nurse or pharmacist before following any medical regimen to see if it is safe and effective for you.

## 2023-10-10 NOTE — PROGRESS NOTES
Assessment        Diagnoses and all orders for this visit:    Encntr for gyn exam (general) (routine) w/o abn findings    Cervical cancer screening  -     Liquid-based pap, screening    Screen for STD (sexually transmitted disease)  -     Chlamydia/GC amplified DNA by PCR  -     Trichomonas vaginalis Thin prep  -     RPR-Syphilis Screening (Total Syphilis IGG/IGM); Future  -     HIV 1/2 AG/AB w Reflex [facility] for 2 yr old and above; Future  -     Hepatitis B surface antigen; Future  -     Hepatitis C antibody; Future  -     Herpes I/II IgG Antibodies; Future    Exposure to STD  -     Chlamydia/GC amplified DNA by PCR  -     Trichomonas vaginalis Thin prep  -     RPR-Syphilis Screening (Total Syphilis IGG/IGM); Future  -     HIV 1/2 AG/AB w Reflex [facility] for 2 yr old and above; Future  -     Hepatitis B surface antigen; Future  -     Hepatitis C antibody; Future  -     Herpes I/II IgG Antibodies; Future    Bilateral breast lump  -     US breast bilateral limited (diagnostic); Future  -     Mammo diagnostic bilateral w 3d & cad; Future    Mass overlapping multiple quadrants of left breast  -     US breast bilateral limited (diagnostic); Future  -     Mammo diagnostic bilateral w 3d & cad; Future    Mass overlapping multiple quadrants of right breast  -     US breast bilateral limited (diagnostic); Future  -     Mammo diagnostic bilateral w 3d & cad; Future    Pelvic pain  -     US pelvis complete w transvaginal; Future             Plan      All questions answered. Await pap smear results. Chlamydia specimen. Contraception: abstinence. Discussed healthy lifestyle modifications. Educational material distributed. Follow up in 1 year. Follow up as needed. GC specimen. Mammogram.  Pelvic ultrasound. Thin prep Pap smear.    STD screening was performed as requested by patient  Declines contraception  Mammogram and bilateral breast ultrasound was ordered due to breast masses palpated  Pelvic ultrasound was ordered due to pelvic pain, discussed management of pain with over-the-counter medications versus hormonal medications for endometriosis versus repeat surgery. She will decide to do a pelvic ultrasound if pelvic pain is persistent      Subjective      Lisette Bran is a 40 y.o. female who presents for annual exam.      Chief Complaint   Patient presents with   • Gynecologic Exam     Left side of breast noticed lump. Few weeks ago. If bumped causes discomfort. No swelling or redness. Requests STD screening for GC/CT  Patient states her  was unfaithful  C/o breast lump, almost behind the nipple, sometimes painful. She noticed this in August. She has no nipple discharge. NO breast trauma.   C/o pelvic pain L  H/o endometriosis and previous laparoscopies, last in   Note states BL ovarian cysts, pelvic adhesions, endometriosis L uterosacral and both endometriosis  H/o normal US in 2019    H/o ectopic R with salpingectomy      Last Pap: 20  Last mammogram:  none      Current contraception: abstinence  History of abnormal Pap smear: no  History of abnormal mammogram: no  Family history of uterine or ovarian cancer: no  Family history of breast cancer: no  Family history of colon cancer: no    OB History    Para Term  AB Living   5 3 3 0 2 3   SAB IAB Ectopic Multiple Live Births   1 0 1 0 3      # Outcome Date GA Lbr Junior/2nd Weight Sex Delivery Anes PTL Lv   5 Term 16 40w5d 02:00 / 00:19 3021 g (6 lb 10.6 oz) M Vag-Spont None N RAFA      Name: Tootie Bajwa  ([id number])      Apgar1: 9  Apgar5: 9   4 Term    3005 g (6 lb 10 oz) F Vag-Spont None  RAFA   3 Term    3147 g (6 lb 15 oz) M Vag-Spont None  RAFA   2 SAB            1 Ectopic                Menstrual History:  OB History        5    Para   3    Term   3            AB   2    Living   3       SAB   1    IAB        Ectopic   1    Multiple   0    Live Births   1                Menarche age: 15  Patient's last menstrual period was 09/26/2023 (exact date).              Past Medical History:   Diagnosis Date   • Acute Lyme disease    • Endometriosis    • Endometriosis    • Fibromyalgia    • Mitral valve regurgitation    • MVP (mitral valve prolapse)    • POTS (postural orthostatic tachycardia syndrome)    • PVC (premature ventricular contraction)    • SVT (supraventricular tachycardia)      Past Surgical History:   Procedure Laterality Date   • ABLATION OF DYSRHYTHMIC FOCUS     • DIAGNOSTIC LAPAROSCOPY      for endometriosis   • LAPAROSCOPY FOR ECTOPIC PREGNANCY     • WISDOM TOOTH EXTRACTION  2008     Family History   Problem Relation Age of Onset   • Hypertension Father    • Cancer Paternal Grandfather    • Sudden death Paternal Grandfather    • Arthritis Mother    • Hypertension Mother    • Arthritis Paternal Uncle    • Rheum arthritis Paternal Uncle    • Lupus Cousin    • No Known Problems Sister    • No Known Problems Brother    • No Known Problems Daughter    • No Known Problems Son    • Cancer Maternal Grandfather    • Arthritis Paternal Grandmother    • No Known Problems Sister    • No Known Problems Son        Social History     Tobacco Use   • Smoking status: Former     Types: Cigarettes     Quit date: 8/30/2013     Years since quitting: 10.1   • Smokeless tobacco: Never   Vaping Use   • Vaping Use: Never used   Substance Use Topics   • Alcohol use: Yes     Comment: Socially/ Rarely   • Drug use: No          Current Outpatient Medications:   •  Ascorbic Acid (VITAMIN C PO), Take by mouth, Disp: , Rfl:   •  BIOTIN PO, Take by mouth, Disp: , Rfl:   •  CALCIUM PO, Take by mouth, Disp: , Rfl:   •  Cyanocobalamin (VITAMIN B-12 PO), Take by mouth, Disp: , Rfl:   •  MAGNESIUM PO, Take by mouth, Disp: , Rfl:   •  PREVIDENT 5000 BOOSTER PLUS 1.1 % PSTE, Take 1 application by mouth daily Use as directed, Disp: , Rfl: 3  •  rizatriptan (MAXALT) 10 MG tablet, Take 1 tablet (10 mg total) by mouth once as needed for migraine May repeat every 2 hours if needed, max 30mg/24 hours, Disp: 12 tablet, Rfl: 3    Allergies   Allergen Reactions   • Bee Pollen Itching   • Pollen Extract            Review of Systems   Constitutional: Negative. HENT: Negative. Eyes: Negative. Respiratory: Negative. Cardiovascular: Negative. Gastrointestinal: Negative. Endocrine: Negative. Genitourinary:        As noted in HPI   Musculoskeletal: Negative. Skin: Negative. Allergic/Immunologic: Negative. Neurological: Negative. Hematological: Negative. Psychiatric/Behavioral: Negative. /72 (BP Location: Right arm, Patient Position: Sitting, Cuff Size: Adult)   Pulse 89   Ht 5' 1.25" (1.556 m)   Wt 60.6 kg (133 lb 9.6 oz)   LMP 09/26/2023 (Exact Date)   BMI 25.04 kg/m²         Physical Exam  Constitutional:       Appearance: She is well-developed. Genitourinary:      Vulva, bladder and rectum normal.      No lesions in the vagina. Genitourinary Comments:         Right Labia: No rash, tenderness, lesions, skin changes or Bartholin's cyst.     Left Labia: No tenderness, lesions, skin changes, Bartholin's cyst or rash. No inguinal adenopathy present in the right or left side. No vaginal discharge, tenderness or bleeding. No vaginal prolapse present. No vaginal atrophy present. Right Adnexa: not tender, not full and no mass present. Left Adnexa: not tender, not full and no mass present. No cervical motion tenderness, friability, lesion or polyp. Uterus is not enlarged or tender. Pelvic exam was performed with patient in the lithotomy position. Rectum:      No external hemorrhoid. Breasts:     Right: Mass present. No nipple discharge, skin change or tenderness. Left: Mass present. No nipple discharge, skin change or tenderness. HENT:      Head: Normocephalic. Nose: Nose normal.   Eyes:      Conjunctiva/sclera: Conjunctivae normal.   Neck:      Thyroid: No thyromegaly. Cardiovascular:      Rate and Rhythm: Normal rate and regular rhythm. Heart sounds: Normal heart sounds. No murmur heard. Pulmonary:      Effort: Pulmonary effort is normal. No respiratory distress. Breath sounds: Normal breath sounds. No wheezing or rales. Chest:       Abdominal:      General: There is no distension. Palpations: Abdomen is soft. There is no mass. Tenderness: There is no abdominal tenderness. There is no guarding or rebound. Musculoskeletal:         General: No tenderness. Cervical back: Neck supple. No muscular tenderness. Lymphadenopathy:      Cervical: No cervical adenopathy. Lower Body: No right inguinal adenopathy. No left inguinal adenopathy. Neurological:      Mental Status: She is alert and oriented to person, place, and time. Skin:     General: Skin is warm and dry.    Psychiatric:         Mood and Affect: Mood normal.         Behavior: Behavior normal.             Future Appointments   Date Time Provider [address]   10/11/2024  9:30 AM Jacklyn Cordoba MD [facility]

## 2023-10-12 LAB
C TRACH DNA SPEC QL NAA+PROBE: NEGATIVE
HPV HR 12 DNA CVX QL NAA+PROBE: NEGATIVE
HPV16 DNA CVX QL NAA+PROBE: NEGATIVE
HPV18 DNA CVX QL NAA+PROBE: NEGATIVE
N GONORRHOEA DNA SPEC QL NAA+PROBE: NEGATIVE
T VAGINALIS DNA SPEC QL NAA+PROBE: NEGATIVE

## 2023-10-17 LAB
LAB AP GYN PRIMARY INTERPRETATION: NORMAL
Lab: NORMAL

## 2024-04-17 ENCOUNTER — TELEPHONE (OUTPATIENT)
Age: 39
End: 2024-04-17

## 2024-04-17 DIAGNOSIS — N60.42 DUCT ECTASIA OF BREAST, LEFT: Primary | ICD-10-CM

## 2024-04-17 NOTE — TELEPHONE ENCOUNTER
Returned Rosario's phone call and informed her that the order was placed. I asked if she needed it faxed over and she stated yes. I faxed the order for the US for L breast limited to # [phone]. Fax confirmation was received back.   mother

## 2024-04-17 NOTE — TELEPHONE ENCOUNTER
[phone] ( Rosario ) Please call when created . Pt needs a US for L breast limited. Please advise. [facility] said someone on clinical can call and verbally accept ther imaging for the pt.